# Patient Record
Sex: MALE | Race: WHITE | Employment: UNEMPLOYED | URBAN - NONMETROPOLITAN AREA
[De-identification: names, ages, dates, MRNs, and addresses within clinical notes are randomized per-mention and may not be internally consistent; named-entity substitution may affect disease eponyms.]

---

## 2017-01-21 PROCEDURE — 1800000000 HC LEAVE OF ABSENCE

## 2017-04-24 ENCOUNTER — HOSPITAL ENCOUNTER (OUTPATIENT)
Dept: GENERAL RADIOLOGY | Age: 42
Discharge: HOME | End: 2017-04-24
Payer: MEDICAID

## 2017-04-24 ENCOUNTER — HOSPITAL ENCOUNTER (OUTPATIENT)
Dept: PREADMISSION TESTING | Age: 42
Discharge: HOME | End: 2017-04-24
Payer: MEDICAID

## 2017-04-24 VITALS — WEIGHT: 218.9 LBS | BODY MASS INDEX: 32.42 KG/M2 | HEIGHT: 69 IN

## 2017-04-24 LAB
ALBUMIN SERPL-MCNC: 4.4 G/DL (ref 3.5–5.2)
ALP BLD-CCNC: 69 U/L (ref 40–130)
ALT SERPL-CCNC: 45 U/L (ref 5–41)
ANION GAP SERPL CALCULATED.3IONS-SCNC: 15 MMOL/L (ref 7–19)
APTT: 28.1 SEC (ref 26–36.2)
AST SERPL-CCNC: 26 U/L (ref 5–40)
BASOPHILS ABSOLUTE: 0.1 K/UL (ref 0–0.2)
BASOPHILS RELATIVE PERCENT: 1 % (ref 0–1)
BILIRUB SERPL-MCNC: 0.3 MG/DL (ref 0.2–1.2)
BILIRUBIN URINE: NEGATIVE
BLOOD, URINE: NEGATIVE
BUN BLDV-MCNC: 11 MG/DL (ref 6–20)
CALCIUM SERPL-MCNC: 9.4 MG/DL (ref 8.6–10)
CHLORIDE BLD-SCNC: 103 MMOL/L (ref 98–111)
CLARITY: ABNORMAL
CO2: 23 MMOL/L (ref 22–29)
COLOR: YELLOW
CREAT SERPL-MCNC: 0.7 MG/DL (ref 0.5–1.2)
EOSINOPHILS ABSOLUTE: 0.1 K/UL (ref 0–0.6)
EOSINOPHILS RELATIVE PERCENT: 2 % (ref 0–5)
GFR NON-AFRICAN AMERICAN: >60
GLOBULIN: 2.6 G/DL
GLUCOSE BLD-MCNC: 98 MG/DL (ref 74–109)
GLUCOSE URINE: NEGATIVE MG/DL
HCT VFR BLD CALC: 43.1 % (ref 42–52)
HEMOGLOBIN: 14.9 G/DL (ref 14–18)
INR BLD: 1.04 (ref 0.88–1.18)
KETONES, URINE: NEGATIVE MG/DL
LEUKOCYTE ESTERASE, URINE: NEGATIVE
LYMPHOCYTES ABSOLUTE: 2.6 K/UL (ref 1.1–4.5)
LYMPHOCYTES RELATIVE PERCENT: 36.4 % (ref 20–40)
MCH RBC QN AUTO: 30.3 PG (ref 27–31)
MCHC RBC AUTO-ENTMCNC: 34.6 G/DL (ref 33–37)
MCV RBC AUTO: 87.8 FL (ref 80–94)
MONOCYTES ABSOLUTE: 0.4 K/UL (ref 0–0.9)
MONOCYTES RELATIVE PERCENT: 6.1 % (ref 0–10)
NEUTROPHILS ABSOLUTE: 3.8 K/UL (ref 1.5–7.5)
NEUTROPHILS RELATIVE PERCENT: 53.9 % (ref 50–65)
NITRITE, URINE: NEGATIVE
PDW BLD-RTO: 12.6 % (ref 11.5–14.5)
PH UA: 5.5
PLATELET # BLD: 238 K/UL (ref 130–400)
PMV BLD AUTO: 9.9 FL (ref 7.4–10.4)
POTASSIUM SERPL-SCNC: 3.6 MMOL/L (ref 3.5–5)
PROTEIN UA: NEGATIVE MG/DL
PROTHROMBIN TIME: 13.6 SEC (ref 12–14.6)
RBC # BLD: 4.91 M/UL (ref 4.7–6.1)
SODIUM BLD-SCNC: 141 MMOL/L (ref 136–145)
SPECIFIC GRAVITY UA: >=1.03
TOTAL PROTEIN: 7 G/DL (ref 6.6–8.7)
UROBILINOGEN, URINE: 0.2 E.U./DL
WBC # BLD: 7 K/UL (ref 4.8–10.8)

## 2017-04-24 PROCEDURE — 93005 ELECTROCARDIOGRAM TRACING: CPT

## 2017-04-24 PROCEDURE — 80053 COMPREHEN METABOLIC PANEL: CPT

## 2017-04-24 PROCEDURE — 85730 THROMBOPLASTIN TIME PARTIAL: CPT

## 2017-04-24 PROCEDURE — 85025 COMPLETE CBC W/AUTO DIFF WBC: CPT

## 2017-04-24 PROCEDURE — 81003 URINALYSIS AUTO W/O SCOPE: CPT

## 2017-04-24 PROCEDURE — 71020 XR CHEST STANDARD TWO VW: CPT

## 2017-04-24 PROCEDURE — 85610 PROTHROMBIN TIME: CPT

## 2017-04-24 RX ORDER — OXYCODONE HYDROCHLORIDE AND ACETAMINOPHEN 5; 325 MG/1; MG/1
1 TABLET ORAL EVERY 4 HOURS PRN
Status: ON HOLD | COMMUNITY
End: 2017-05-18 | Stop reason: HOSPADM

## 2017-04-24 RX ORDER — LISINOPRIL 5 MG/1
5 TABLET ORAL DAILY
COMMUNITY

## 2017-04-24 RX ORDER — GABAPENTIN 300 MG/1
300 CAPSULE ORAL 3 TIMES DAILY
COMMUNITY

## 2017-04-24 RX ORDER — SIMVASTATIN 20 MG
20 TABLET ORAL NIGHTLY
COMMUNITY

## 2017-05-01 LAB
EKG P AXIS: 4 DEGREES
EKG P-R INTERVAL: 198 MS
EKG Q-T INTERVAL: 404 MS
EKG QRS DURATION: 104 MS
EKG QTC CALCULATION (BAZETT): 409 MS
EKG T AXIS: 19 DEGREES

## 2017-05-16 ENCOUNTER — HOSPITAL ENCOUNTER (INPATIENT)
Age: 42
LOS: 2 days | Discharge: HOME | DRG: 455 | End: 2017-05-18
Payer: MEDICAID

## 2017-05-16 ENCOUNTER — ANESTHESIA (OUTPATIENT)
Dept: OPERATING ROOM | Age: 42
DRG: 455 | End: 2017-05-16
Payer: MEDICAID

## 2017-05-16 ENCOUNTER — APPOINTMENT (OUTPATIENT)
Dept: GENERAL RADIOLOGY | Age: 42
DRG: 455 | End: 2017-05-16
Payer: MEDICAID

## 2017-05-16 ENCOUNTER — ANESTHESIA EVENT (OUTPATIENT)
Dept: OPERATING ROOM | Age: 42
DRG: 455 | End: 2017-05-16
Payer: MEDICAID

## 2017-05-16 VITALS
DIASTOLIC BLOOD PRESSURE: 64 MMHG | OXYGEN SATURATION: 94 % | RESPIRATION RATE: 31 BRPM | TEMPERATURE: 96.5 F | SYSTOLIC BLOOD PRESSURE: 95 MMHG

## 2017-05-16 PROBLEM — M51.36 DDD (DEGENERATIVE DISC DISEASE), LUMBAR: Status: ACTIVE | Noted: 2017-05-16

## 2017-05-16 PROBLEM — M47.26 OSTEOARTHRITIS OF SPINE WITH RADICULOPATHY, LUMBAR REGION: Status: ACTIVE | Noted: 2017-05-16

## 2017-05-16 LAB
ABO/RH: NORMAL
ANTIBODY SCREEN: NORMAL

## 2017-05-16 PROCEDURE — 97116 GAIT TRAINING THERAPY: CPT

## 2017-05-16 PROCEDURE — 2580000003 HC RX 258: Performed by: PHYSICIAN ASSISTANT

## 2017-05-16 PROCEDURE — 3600000015 HC SURGERY LEVEL 5 ADDTL 15MIN

## 2017-05-16 PROCEDURE — 86901 BLOOD TYPING SEROLOGIC RH(D): CPT

## 2017-05-16 PROCEDURE — 2580000003 HC RX 258

## 2017-05-16 PROCEDURE — 1210000000 HC MED SURG R&B

## 2017-05-16 PROCEDURE — 6360000002 HC RX W HCPCS

## 2017-05-16 PROCEDURE — 2720000001 HC MISC SURG SUPPLY STERILE $51-500

## 2017-05-16 PROCEDURE — 3700000000 HC ANESTHESIA ATTENDED CARE

## 2017-05-16 PROCEDURE — L8699 PROSTHETIC IMPLANT NOS: HCPCS

## 2017-05-16 PROCEDURE — 7100000001 HC PACU RECOVERY - ADDTL 15 MIN

## 2017-05-16 PROCEDURE — 72110 X-RAY EXAM L-2 SPINE 4/>VWS: CPT

## 2017-05-16 PROCEDURE — 97162 PT EVAL MOD COMPLEX 30 MIN: CPT

## 2017-05-16 PROCEDURE — C1776 JOINT DEVICE (IMPLANTABLE): HCPCS

## 2017-05-16 PROCEDURE — 94664 DEMO&/EVAL PT USE INHALER: CPT

## 2017-05-16 PROCEDURE — 86850 RBC ANTIBODY SCREEN: CPT

## 2017-05-16 PROCEDURE — 36415 COLL VENOUS BLD VENIPUNCTURE: CPT

## 2017-05-16 PROCEDURE — 2780000010 HC IMPLANT OTHER

## 2017-05-16 PROCEDURE — G8978 MOBILITY CURRENT STATUS: HCPCS

## 2017-05-16 PROCEDURE — C1713 ANCHOR/SCREW BN/BN,TIS/BN: HCPCS

## 2017-05-16 PROCEDURE — 6370000000 HC RX 637 (ALT 250 FOR IP): Performed by: PHYSICIAN ASSISTANT

## 2017-05-16 PROCEDURE — 2500000003 HC RX 250 WO HCPCS

## 2017-05-16 PROCEDURE — 2720000002 HC MISC SURG SUPPLY STERILE >$500

## 2017-05-16 PROCEDURE — 3600000005 HC SURGERY LEVEL 5 BASE

## 2017-05-16 PROCEDURE — G8979 MOBILITY GOAL STATUS: HCPCS

## 2017-05-16 PROCEDURE — 0SG00A0 FUSION OF LUMBAR VERTEBRAL JOINT WITH INTERBODY FUSION DEVICE, ANTERIOR APPROACH, ANTERIOR COLUMN, OPEN APPROACH: ICD-10-PCS

## 2017-05-16 PROCEDURE — 94762 N-INVAS EAR/PLS OXIMTRY CONT: CPT

## 2017-05-16 PROCEDURE — 6360000002 HC RX W HCPCS: Performed by: PHYSICIAN ASSISTANT

## 2017-05-16 PROCEDURE — 86900 BLOOD TYPING SEROLOGIC ABO: CPT

## 2017-05-16 PROCEDURE — 7100000000 HC PACU RECOVERY - FIRST 15 MIN

## 2017-05-16 PROCEDURE — 3700000001 HC ADD 15 MINUTES (ANESTHESIA)

## 2017-05-16 PROCEDURE — 3209999900 FLUORO FOR SURGICAL PROCEDURES

## 2017-05-16 DEVICE — BONE GRAFT KIT 7510200 INFUSE SMALL
Type: IMPLANTABLE DEVICE | Site: SPINE LUMBAR | Status: FUNCTIONAL
Brand: INFUSE® BONE GRAFT

## 2017-05-16 DEVICE — AGENT HEMSTAT 3GM PURIFIED PLNT STARCH PWD ABSRB ARISTA AH: Type: IMPLANTABLE DEVICE | Site: SPINE LUMBAR | Status: FUNCTIONAL

## 2017-05-16 DEVICE — GRAFT BONE SUB 10CC W25XH8XL50MM NANOSTRUCTURED HA GRAN POR: Type: IMPLANTABLE DEVICE | Site: SPINE LUMBAR | Status: FUNCTIONAL

## 2017-05-16 DEVICE — IMPLANTABLE DEVICE: Type: IMPLANTABLE DEVICE | Site: SPINE LUMBAR | Status: FUNCTIONAL

## 2017-05-16 RX ORDER — SODIUM CHLORIDE 0.9 % (FLUSH) 0.9 %
10 SYRINGE (ML) INJECTION PRN
Status: DISCONTINUED | OUTPATIENT
Start: 2017-05-16 | End: 2017-05-18 | Stop reason: HOSPADM

## 2017-05-16 RX ORDER — POLYETHYLENE GLYCOL 3350 17 G/17G
17 POWDER, FOR SOLUTION ORAL DAILY PRN
Status: DISCONTINUED | OUTPATIENT
Start: 2017-05-16 | End: 2017-05-18 | Stop reason: HOSPADM

## 2017-05-16 RX ORDER — LIDOCAINE HYDROCHLORIDE 10 MG/ML
1 INJECTION, SOLUTION EPIDURAL; INFILTRATION; INTRACAUDAL; PERINEURAL
Status: DISCONTINUED | OUTPATIENT
Start: 2017-05-16 | End: 2017-05-16

## 2017-05-16 RX ORDER — HYDRALAZINE HYDROCHLORIDE 20 MG/ML
5 INJECTION INTRAMUSCULAR; INTRAVENOUS EVERY 10 MIN PRN
Status: DISCONTINUED | OUTPATIENT
Start: 2017-05-16 | End: 2017-05-16

## 2017-05-16 RX ORDER — GLYCOPYRROLATE 0.2 MG/ML
INJECTION INTRAMUSCULAR; INTRAVENOUS PRN
Status: DISCONTINUED | OUTPATIENT
Start: 2017-05-16 | End: 2017-05-16 | Stop reason: SDUPTHER

## 2017-05-16 RX ORDER — SIMVASTATIN 20 MG
20 TABLET ORAL NIGHTLY
Status: DISCONTINUED | OUTPATIENT
Start: 2017-05-16 | End: 2017-05-18 | Stop reason: HOSPADM

## 2017-05-16 RX ORDER — GABAPENTIN 300 MG/1
300 CAPSULE ORAL 3 TIMES DAILY
Status: DISCONTINUED | OUTPATIENT
Start: 2017-05-16 | End: 2017-05-18 | Stop reason: HOSPADM

## 2017-05-16 RX ORDER — ENALAPRILAT 2.5 MG/2ML
1.25 INJECTION INTRAVENOUS
Status: DISCONTINUED | OUTPATIENT
Start: 2017-05-16 | End: 2017-05-16

## 2017-05-16 RX ORDER — PROPOFOL 10 MG/ML
INJECTION, EMULSION INTRAVENOUS PRN
Status: DISCONTINUED | OUTPATIENT
Start: 2017-05-16 | End: 2017-05-16 | Stop reason: SDUPTHER

## 2017-05-16 RX ORDER — LIDOCAINE HYDROCHLORIDE 10 MG/ML
INJECTION, SOLUTION EPIDURAL; INFILTRATION; INTRACAUDAL; PERINEURAL PRN
Status: DISCONTINUED | OUTPATIENT
Start: 2017-05-16 | End: 2017-05-16 | Stop reason: SDUPTHER

## 2017-05-16 RX ORDER — DIPHENHYDRAMINE HYDROCHLORIDE 50 MG/ML
12.5 INJECTION INTRAMUSCULAR; INTRAVENOUS
Status: DISCONTINUED | OUTPATIENT
Start: 2017-05-16 | End: 2017-05-16

## 2017-05-16 RX ORDER — LIDOCAINE HYDROCHLORIDE 10 MG/ML
1 INJECTION, SOLUTION EPIDURAL; INFILTRATION; INTRACAUDAL; PERINEURAL ONCE
Status: COMPLETED | OUTPATIENT
Start: 2017-05-16 | End: 2017-05-16

## 2017-05-16 RX ORDER — MEPERIDINE HYDROCHLORIDE 50 MG/ML
12.5 INJECTION INTRAMUSCULAR; INTRAVENOUS; SUBCUTANEOUS EVERY 5 MIN PRN
Status: DISCONTINUED | OUTPATIENT
Start: 2017-05-16 | End: 2017-05-16

## 2017-05-16 RX ORDER — METOCLOPRAMIDE HYDROCHLORIDE 5 MG/ML
10 INJECTION INTRAMUSCULAR; INTRAVENOUS
Status: DISCONTINUED | OUTPATIENT
Start: 2017-05-16 | End: 2017-05-16

## 2017-05-16 RX ORDER — BUPIVACAINE HYDROCHLORIDE 2.5 MG/ML
INJECTION, SOLUTION INFILTRATION; PERINEURAL PRN
Status: DISCONTINUED | OUTPATIENT
Start: 2017-05-16 | End: 2017-05-16

## 2017-05-16 RX ORDER — ONDANSETRON 2 MG/ML
4 INJECTION INTRAMUSCULAR; INTRAVENOUS EVERY 6 HOURS PRN
Status: DISCONTINUED | OUTPATIENT
Start: 2017-05-16 | End: 2017-05-18 | Stop reason: HOSPADM

## 2017-05-16 RX ORDER — FENTANYL CITRATE 50 UG/ML
INJECTION, SOLUTION INTRAMUSCULAR; INTRAVENOUS PRN
Status: DISCONTINUED | OUTPATIENT
Start: 2017-05-16 | End: 2017-05-16 | Stop reason: SDUPTHER

## 2017-05-16 RX ORDER — MIDAZOLAM HYDROCHLORIDE 1 MG/ML
INJECTION INTRAMUSCULAR; INTRAVENOUS PRN
Status: DISCONTINUED | OUTPATIENT
Start: 2017-05-16 | End: 2017-05-16 | Stop reason: SDUPTHER

## 2017-05-16 RX ORDER — SUCCINYLCHOLINE CHLORIDE 20 MG/ML
INJECTION INTRAMUSCULAR; INTRAVENOUS PRN
Status: DISCONTINUED | OUTPATIENT
Start: 2017-05-16 | End: 2017-05-16 | Stop reason: SDUPTHER

## 2017-05-16 RX ORDER — FENTANYL CITRATE 50 UG/ML
50 INJECTION, SOLUTION INTRAMUSCULAR; INTRAVENOUS
Status: DISCONTINUED | OUTPATIENT
Start: 2017-05-16 | End: 2017-05-16

## 2017-05-16 RX ORDER — SODIUM CHLORIDE 9 MG/ML
INJECTION, SOLUTION INTRAVENOUS CONTINUOUS
Status: ACTIVE | OUTPATIENT
Start: 2017-05-16 | End: 2017-05-18

## 2017-05-16 RX ORDER — SODIUM CHLORIDE 0.9 % (FLUSH) 0.9 %
10 SYRINGE (ML) INJECTION EVERY 12 HOURS SCHEDULED
Status: DISCONTINUED | OUTPATIENT
Start: 2017-05-16 | End: 2017-05-16

## 2017-05-16 RX ORDER — FENTANYL CITRATE 50 UG/ML
25 INJECTION, SOLUTION INTRAMUSCULAR; INTRAVENOUS
Status: DISCONTINUED | OUTPATIENT
Start: 2017-05-16 | End: 2017-05-16

## 2017-05-16 RX ORDER — EPHEDRINE SULFATE 50 MG/ML
INJECTION, SOLUTION INTRAVENOUS PRN
Status: DISCONTINUED | OUTPATIENT
Start: 2017-05-16 | End: 2017-05-16 | Stop reason: SDUPTHER

## 2017-05-16 RX ORDER — SODIUM CHLORIDE 0.9 % (FLUSH) 0.9 %
10 SYRINGE (ML) INJECTION EVERY 12 HOURS SCHEDULED
Status: DISCONTINUED | OUTPATIENT
Start: 2017-05-16 | End: 2017-05-18 | Stop reason: HOSPADM

## 2017-05-16 RX ORDER — PROMETHAZINE HYDROCHLORIDE 25 MG/ML
6.25 INJECTION, SOLUTION INTRAMUSCULAR; INTRAVENOUS
Status: DISCONTINUED | OUTPATIENT
Start: 2017-05-16 | End: 2017-05-16

## 2017-05-16 RX ORDER — OXYCODONE HYDROCHLORIDE AND ACETAMINOPHEN 5; 325 MG/1; MG/1
2 TABLET ORAL EVERY 4 HOURS PRN
Status: DISCONTINUED | OUTPATIENT
Start: 2017-05-16 | End: 2017-05-18 | Stop reason: HOSPADM

## 2017-05-16 RX ORDER — LABETALOL HYDROCHLORIDE 5 MG/ML
5 INJECTION, SOLUTION INTRAVENOUS EVERY 10 MIN PRN
Status: DISCONTINUED | OUTPATIENT
Start: 2017-05-16 | End: 2017-05-16

## 2017-05-16 RX ORDER — LISINOPRIL 5 MG/1
5 TABLET ORAL DAILY
Status: DISCONTINUED | OUTPATIENT
Start: 2017-05-16 | End: 2017-05-18 | Stop reason: HOSPADM

## 2017-05-16 RX ORDER — SODIUM CHLORIDE, SODIUM LACTATE, POTASSIUM CHLORIDE, CALCIUM CHLORIDE 600; 310; 30; 20 MG/100ML; MG/100ML; MG/100ML; MG/100ML
INJECTION, SOLUTION INTRAVENOUS CONTINUOUS
Status: DISCONTINUED | OUTPATIENT
Start: 2017-05-16 | End: 2017-05-16

## 2017-05-16 RX ORDER — DEXAMETHASONE SODIUM PHOSPHATE 4 MG/ML
INJECTION, SOLUTION INTRA-ARTICULAR; INTRALESIONAL; INTRAMUSCULAR; INTRAVENOUS; SOFT TISSUE PRN
Status: DISCONTINUED | OUTPATIENT
Start: 2017-05-16 | End: 2017-05-16

## 2017-05-16 RX ORDER — DOCUSATE SODIUM 100 MG/1
100 CAPSULE, LIQUID FILLED ORAL DAILY
Status: DISCONTINUED | OUTPATIENT
Start: 2017-05-16 | End: 2017-05-18 | Stop reason: HOSPADM

## 2017-05-16 RX ORDER — ACETAMINOPHEN 325 MG/1
650 TABLET ORAL EVERY 4 HOURS PRN
Status: DISCONTINUED | OUTPATIENT
Start: 2017-05-16 | End: 2017-05-18 | Stop reason: HOSPADM

## 2017-05-16 RX ORDER — ONDANSETRON 2 MG/ML
INJECTION INTRAMUSCULAR; INTRAVENOUS PRN
Status: DISCONTINUED | OUTPATIENT
Start: 2017-05-16 | End: 2017-05-16 | Stop reason: SDUPTHER

## 2017-05-16 RX ORDER — MORPHINE SULFATE 4 MG/ML
4 INJECTION, SOLUTION INTRAMUSCULAR; INTRAVENOUS EVERY 5 MIN PRN
Status: DISCONTINUED | OUTPATIENT
Start: 2017-05-16 | End: 2017-05-16

## 2017-05-16 RX ORDER — DEXAMETHASONE SODIUM PHOSPHATE 10 MG/ML
INJECTION INTRAMUSCULAR; INTRAVENOUS PRN
Status: DISCONTINUED | OUTPATIENT
Start: 2017-05-16 | End: 2017-05-16 | Stop reason: SDUPTHER

## 2017-05-16 RX ORDER — OXYCODONE HYDROCHLORIDE AND ACETAMINOPHEN 5; 325 MG/1; MG/1
1 TABLET ORAL EVERY 4 HOURS PRN
Status: DISCONTINUED | OUTPATIENT
Start: 2017-05-16 | End: 2017-05-18 | Stop reason: HOSPADM

## 2017-05-16 RX ORDER — SODIUM CHLORIDE 0.9 % (FLUSH) 0.9 %
10 SYRINGE (ML) INJECTION ONCE
Status: DISCONTINUED | OUTPATIENT
Start: 2017-05-16 | End: 2017-05-16

## 2017-05-16 RX ORDER — MORPHINE SULFATE 4 MG/ML
2 INJECTION, SOLUTION INTRAMUSCULAR; INTRAVENOUS EVERY 5 MIN PRN
Status: DISCONTINUED | OUTPATIENT
Start: 2017-05-16 | End: 2017-05-16

## 2017-05-16 RX ORDER — MIDAZOLAM HYDROCHLORIDE 1 MG/ML
2 INJECTION INTRAMUSCULAR; INTRAVENOUS
Status: DISCONTINUED | OUTPATIENT
Start: 2017-05-16 | End: 2017-05-16

## 2017-05-16 RX ORDER — SODIUM CHLORIDE 0.9 % (FLUSH) 0.9 %
10 SYRINGE (ML) INJECTION PRN
Status: DISCONTINUED | OUTPATIENT
Start: 2017-05-16 | End: 2017-05-16

## 2017-05-16 RX ORDER — PROPOFOL 10 MG/ML
INJECTION, EMULSION INTRAVENOUS CONTINUOUS PRN
Status: DISCONTINUED | OUTPATIENT
Start: 2017-05-16 | End: 2017-05-16 | Stop reason: SDUPTHER

## 2017-05-16 RX ORDER — KETOROLAC TROMETHAMINE 30 MG/ML
INJECTION, SOLUTION INTRAMUSCULAR; INTRAVENOUS PRN
Status: DISCONTINUED | OUTPATIENT
Start: 2017-05-16 | End: 2017-05-16 | Stop reason: SDUPTHER

## 2017-05-16 RX ADMIN — HYDROMORPHONE HYDROCHLORIDE 1 MG: 1 INJECTION, SOLUTION INTRAMUSCULAR; INTRAVENOUS; SUBCUTANEOUS at 12:00

## 2017-05-16 RX ADMIN — GLYCOPYRROLATE 0.3 MG: 0.2 INJECTION, SOLUTION INTRAMUSCULAR; INTRAVENOUS at 08:12

## 2017-05-16 RX ADMIN — SODIUM CHLORIDE: 9 INJECTION, SOLUTION INTRAVENOUS at 10:17

## 2017-05-16 RX ADMIN — HYDROMORPHONE HYDROCHLORIDE 0.5 MG: 1 INJECTION, SOLUTION INTRAMUSCULAR; INTRAVENOUS; SUBCUTANEOUS at 09:27

## 2017-05-16 RX ADMIN — MIDAZOLAM HYDROCHLORIDE 2 MG: 1 INJECTION, SOLUTION INTRAMUSCULAR; INTRAVENOUS at 07:25

## 2017-05-16 RX ADMIN — LIDOCAINE HYDROCHLORIDE 50 MG: 10 INJECTION, SOLUTION EPIDURAL; INFILTRATION; INTRACAUDAL; PERINEURAL at 07:38

## 2017-05-16 RX ADMIN — CEFAZOLIN SODIUM 1 G: 1 INJECTION, SOLUTION INTRAVENOUS at 23:37

## 2017-05-16 RX ADMIN — SIMVASTATIN 20 MG: 20 TABLET, FILM COATED ORAL at 20:02

## 2017-05-16 RX ADMIN — Medication 2 G: at 07:51

## 2017-05-16 RX ADMIN — HYDROMORPHONE HYDROCHLORIDE 0.5 MG: 1 INJECTION, SOLUTION INTRAMUSCULAR; INTRAVENOUS; SUBCUTANEOUS at 09:09

## 2017-05-16 RX ADMIN — OXYCODONE HYDROCHLORIDE AND ACETAMINOPHEN 2 TABLET: 5; 325 TABLET ORAL at 23:37

## 2017-05-16 RX ADMIN — LISINOPRIL 5 MG: 5 TABLET ORAL at 10:24

## 2017-05-16 RX ADMIN — SODIUM CHLORIDE, SODIUM LACTATE, POTASSIUM CHLORIDE, AND CALCIUM CHLORIDE: 600; 310; 30; 20 INJECTION, SOLUTION INTRAVENOUS at 06:31

## 2017-05-16 RX ADMIN — Medication 10 ML: at 20:03

## 2017-05-16 RX ADMIN — ONDANSETRON 4 MG: 2 INJECTION INTRAMUSCULAR; INTRAVENOUS at 19:30

## 2017-05-16 RX ADMIN — GABAPENTIN 300 MG: 300 CAPSULE ORAL at 20:02

## 2017-05-16 RX ADMIN — OXYCODONE HYDROCHLORIDE AND ACETAMINOPHEN 2 TABLET: 5; 325 TABLET ORAL at 10:14

## 2017-05-16 RX ADMIN — ONDANSETRON HYDROCHLORIDE 4 MG: 2 INJECTION, SOLUTION INTRAVENOUS at 07:57

## 2017-05-16 RX ADMIN — REMIFENTANIL HYDROCHLORIDE 0.7 MCG/KG/MIN: 1 INJECTION, POWDER, LYOPHILIZED, FOR SOLUTION INTRAVENOUS at 07:45

## 2017-05-16 RX ADMIN — PROPOFOL 160 MG: 10 INJECTION, EMULSION INTRAVENOUS at 07:38

## 2017-05-16 RX ADMIN — FENTANYL CITRATE 50 MCG: 50 INJECTION INTRAMUSCULAR; INTRAVENOUS at 08:55

## 2017-05-16 RX ADMIN — GABAPENTIN 300 MG: 300 CAPSULE ORAL at 15:12

## 2017-05-16 RX ADMIN — PROPOFOL 100 MCG/KG/MIN: 10 INJECTION, EMULSION INTRAVENOUS at 07:45

## 2017-05-16 RX ADMIN — DEXAMETHASONE SODIUM PHOSPHATE 10 MG: 10 INJECTION INTRAMUSCULAR; INTRAVENOUS at 07:57

## 2017-05-16 RX ADMIN — GABAPENTIN 300 MG: 300 CAPSULE ORAL at 10:23

## 2017-05-16 RX ADMIN — EPHEDRINE SULFATE 10 MG: 50 INJECTION, SOLUTION INTRAMUSCULAR; INTRAVENOUS; SUBCUTANEOUS at 08:08

## 2017-05-16 RX ADMIN — CEFAZOLIN SODIUM 1 G: 1 INJECTION, SOLUTION INTRAVENOUS at 16:27

## 2017-05-16 RX ADMIN — SODIUM CHLORIDE, SODIUM LACTATE, POTASSIUM CHLORIDE, AND CALCIUM CHLORIDE: 600; 310; 30; 20 INJECTION, SOLUTION INTRAVENOUS at 07:55

## 2017-05-16 RX ADMIN — FENTANYL CITRATE 50 MCG: 50 INJECTION INTRAMUSCULAR; INTRAVENOUS at 07:32

## 2017-05-16 RX ADMIN — OXYCODONE HYDROCHLORIDE AND ACETAMINOPHEN 2 TABLET: 5; 325 TABLET ORAL at 19:29

## 2017-05-16 RX ADMIN — FENTANYL CITRATE 50 MCG: 50 INJECTION INTRAMUSCULAR; INTRAVENOUS at 07:25

## 2017-05-16 RX ADMIN — SUCCINYLCHOLINE CHLORIDE 140 MG: 20 INJECTION, SOLUTION INTRAMUSCULAR; INTRAVENOUS; PARENTERAL at 07:38

## 2017-05-16 RX ADMIN — HYDROMORPHONE HYDROCHLORIDE 1 MG: 1 INJECTION, SOLUTION INTRAMUSCULAR; INTRAVENOUS; SUBCUTANEOUS at 16:26

## 2017-05-16 RX ADMIN — PHENYLEPHRINE HYDROCHLORIDE 100 MCG: 10 INJECTION INTRAVENOUS at 08:11

## 2017-05-16 RX ADMIN — EPHEDRINE SULFATE 10 MG: 50 INJECTION, SOLUTION INTRAMUSCULAR; INTRAVENOUS; SUBCUTANEOUS at 07:58

## 2017-05-16 RX ADMIN — KETOROLAC TROMETHAMINE 30 MG: 30 INJECTION, SOLUTION INTRAMUSCULAR at 08:51

## 2017-05-16 RX ADMIN — LIDOCAINE HYDROCHLORIDE 1 ML: 10 INJECTION, SOLUTION EPIDURAL; INFILTRATION; INTRACAUDAL; PERINEURAL at 06:31

## 2017-05-16 RX ADMIN — SODIUM CHLORIDE: 9 INJECTION, SOLUTION INTRAVENOUS at 21:45

## 2017-05-16 RX ADMIN — OXYCODONE HYDROCHLORIDE AND ACETAMINOPHEN 2 TABLET: 5; 325 TABLET ORAL at 15:13

## 2017-05-16 ASSESSMENT — PAIN SCALES - GENERAL
PAINLEVEL_OUTOF10: 6
PAINLEVEL_OUTOF10: 3
PAINLEVEL_OUTOF10: 6
PAINLEVEL_OUTOF10: 7
PAINLEVEL_OUTOF10: 0
PAINLEVEL_OUTOF10: 8
PAINLEVEL_OUTOF10: 8
PAINLEVEL_OUTOF10: 7
PAINLEVEL_OUTOF10: 6
PAINLEVEL_OUTOF10: 8
PAINLEVEL_OUTOF10: 5
PAINLEVEL_OUTOF10: 6
PAINLEVEL_OUTOF10: 8
PAINLEVEL_OUTOF10: 8

## 2017-05-16 ASSESSMENT — PAIN - FUNCTIONAL ASSESSMENT: PAIN_FUNCTIONAL_ASSESSMENT: 0-10

## 2017-05-16 NOTE — PROGRESS NOTES
Positioning, Pain Management  Plan Comment: LSO  Safety Devices  Type of devices:  (SITTING EOB WITH WIFE PRESENT)    G-Code  PT G-Codes  Functional Assessment Tool Used: BED>CHAIR  Score: MIN, CK  Functional Limitation: Mobility: Walking and moving around  Mobility: Walking and Moving Around Current Status (): At least 40 percent but less than 60 percent impaired, limited or restricted  Mobility: Walking and Moving Around Goal Status ():  At least 20 percent but less than 40 percent impaired, limited or restricted    OutComes Score                                             Goals  Short term goals  Time Frame for Short term goals: 14 DAYS BEFORE RE EVAL  Short term goal 1: SUP<>SIT IND  Short term goal 2: SIT<>STAND SBA  Short term goal 3:  FT WITH RW AND SBA  Short term goal 4: PT AND FAMILY ABLE TO DON LSO IND  Short term goal 5: STEPS WITH CGA  Patient Goals   Patient goals : RETURN HOME WITH WIFE       Therapy Time   Individual Concurrent Group Co-treatment   Time In           Time Out           Minutes                   Douglas Acosta PT     Electronically signed by Douglas Acosta PT on 5/16/2017 at 1:02 PM

## 2017-05-16 NOTE — PROGRESS NOTES
Treatment Recommendations Functional Mobility Training;Transfer Training; Safety Education & Training;Stair training;Gait Training;Positioning;Pain Management   Plan Comment LSO   PT Whiteboard Notes   Therapy Whiteboard LSO       Electronically signed by Tiffany Powell PT on 5/16/2017 at 1:12 PM

## 2017-05-16 NOTE — BRIEF OP NOTE
Brief Postoperative Note  ______________________________________________________________    Patient: Demetria Jason  YOB: 1975  MRN: [id number]  Date of Procedure: 5/16/2017    Pre-Op Diagnosis: SPINAL STENOSIS LUMBAR REGION        Procedure(s):  LUMBAR INTERBODY FUSION LATERAL LEFT L4-5    Anesthesia: Other    Surgeon(s):  Roshan Veliz MD    Staff:  Scrub Person First: Christa Richter     Estimated Blood Loss: 312CI    Complications: None    Specimens:   * No specimens in log *    Implants:    Implant Name Type Inv.  Item Serial No.  Lot No. LRB No. Used   HEMASTATIC AGENT 3GM ABSORBABLE INESSA BELLOW Bone/Graft/Tissue HEMASTATIC AGENT 3GM ABSORBABLE INESSA BELLOW  [employer]  Left 1   SAAD-GRAFT INFUSE KT SM Bone/Graft/Tissue SAAD-GRAFT INFUSE KT SM  [employer] [place] [employer] [id number] Left 1   GRAFT SUB BONE 10CC Bone/Graft/Tissue GRAFT SUB BONE 10CC   [id number] Left 1   IMPL SPINAL [id number] DEG Spine IMPL SPINAL [id number] DEG  [employer]  Left 1   IMPL KWIRE TIMBELINE Leg/Ankle/Foot/Toe IMPL KWIRE TIMBELINE   [employer]   Left 1         Drains:           Findings: SEE OPERATIVE REPORT    Carlene Freeman PA-C  Date: 5/16/2017  Time: 9:49 AM

## 2017-05-16 NOTE — BRIEF OP NOTE
Brief Postoperative Note  ______________________________________________________________    Patient: Nicolle Armenta  YOB: 1975  MRN: [id number]  Date of Procedure: 5/16/2017    Pre-Op Diagnosis: SPINAL STENOSIS LUMBAR REGION        Procedure(s):  LUMBAR INTERBODY FUSION LATERAL LEFT L4-5    Anesthesia: Other    Surgeon(s):  Ryann Mohan MD    Staff:  Scrub Person First: Giuliano Ag     Estimated Blood Loss: * 50 ml    Complications: None    Specimens:  No specimens   Implants:    Implant Name Type Inv.  Item Serial No.  Lot No. LRB No. Used   HEMASTATIC AGENT 3GM ABSORBABLE INESSA BELLOW Bone/Graft/Tissue HEMASTATIC AGENT 3GM ABSORBABLE INESSA BELLOW  [employer]  Left 1   SAAD-GRAFT INFUSE KT SM Bone/Graft/Tissue SAAD-GRAFT INFUSE KT SM  [employer] [place] [employer] [id number] Left 1   GRAFT SUB BONE 10CC Bone/Graft/Tissue GRAFT SUB BONE 10CC   [id number] Left 1   IMPL SPINAL [id number] DEG Spine IMPL SPINAL [id number] DEG  [employer]  Left 1   IMPL KWIRE TIMBELINE Leg/Ankle/Foot/Toe IMPL KWIRE TIMBELINE   [employer]   Left 1         Drains: no          Findings: see op note    Ryann Mohan MD  Date: 5/16/2017  Time: 9:51 AM

## 2017-05-16 NOTE — IP AVS SNAPSHOT
Osteoarthritis Of Spine With Radiculopathy, Lumbar Region      Visit Information     Date & Time Provider Department Dept. Phone    5/16/2017 Sierra Callahan MD [facility] 5 Surg Services [phone]       Follow-up Appointments    Below is a list of your follow-up and future appointments. This may not be a complete list as you may have made appointments directly with providers that we are not aware of or your providers may have made some for you. Please call your providers to confirm appointments. It is important to keep your appointments. Please bring your current insurance card, photo ID, co-pay, and all medication bottles to your appointment. If self-pay, payment is expected at the time of service. Follow-up Information     Follow up with Dr. Tod Mckeon M.D., MD .    Specialty:  [facility] Medicine    Contact information:    [place] [id number] [phone]          Follow up with Sierra Callahan MD. Go on 5/31/2017. Specialty:  Orthopedic Surgery    Why:  May 31st at 10:10am    Contact information:    [address] [facility]  [phone]             Care Plan Once You Return Home    This section includes instructions you will need to follow once you leave the hospital.  Your care team will discuss these with you, so you and those caring for you know how to best care for your health needs at home. This section may also include educational information about certain health topics that may be of help to you. Discharge Instructions       Wound Care: leave Prineo intact, okay to shower      Diet Instructions     ? Good nutrition is important when healing from an illness, injury, or surgery. Follow any nutrition recommendations given to you during your hospital stay. ? If you were given an oral nutrition supplement while in the hospital, continue to take this supplement at home.   You can take it with meals, medical emergencies, dial 911. For questions regarding your [employer] account call [phone]. If you have a clinical question, please call your doctor's office. View your information online  ? Review your current list of  medications, immunization, and allergies. ? Review your future test results online . ? Review your discharge instructions provided by your caregivers at discharge    Certain functionality such as prescription refills, scheduling appointments or sending messages to your provider are not activated if your provider does not use IT[employer] in his/her office    For questions regarding your [employer] account call [phone]. If you have a clinical question, please call your doctor's office. The information on all pages of the After Visit Summary has been reviewed with me, the patient and/or responsible adult, by my health care provider(s). I had the opportunity to ask questions regarding this information. I understand I should dispose of my armband safely at home to protect my health information. A complete copy of the After Visit Summary has been given to me, the patient and/or responsible adult. Patient Signature/Responsible Adult:____________________    Clinician Signature:_____________________    Date:_____________________    Time:_____________________        More Information           Lumbar Spinal Fusion: What to Expect at Home  Your Recovery    After surgery, you can expect your back to feel stiff and sore. You may have trouble sitting or standing in one position for very long and may need pain medicine in the weeks after your surgery. It may take 4 to 6 weeks to get back to doing simple activities, such as light housework. It may take 6 months to a year for your back to get better completely. You may need to wear a back brace while your back heals. And your doctor may have you go to physical therapy. If your job doesn't require physical labor, you will probably be able to go back to work after 1 or 2 months. If your job involves light physical labor, it may take 3 to 6 months. Most people whose jobs involved heavy labor can never return to those jobs. This care sheet gives you a general idea about how long it will take for you to recover. But each person recovers at a different pace. Follow the steps below to get better as quickly as possible. How can you care for yourself at home? Activity  · Rest when you feel tired. Getting enough sleep will help you recover. · Try to walk each day. Start by walking a little more than you did the day before. Bit by bit, increase the amount you walk. Walking boosts blood flow and helps prevent pneumonia and constipation. Walking may also decrease your muscle soreness after surgery. · If advised by your doctor, you may need to avoid lifting anything that would cause excessive strain on your back. This may include a child, heavy grocery bags and milk containers, a heavy briefcase or backpack, cat litter or dog food bags, or a vacuum . · Avoid strenuous activities, such as bicycle riding, jogging, weight lifting, or aerobic exercise, until your doctor says it is okay. · Do not drive for 2 to 4 weeks after your surgery or until your doctor says it is okay. · Avoid riding in a car for more than 30 minutes at a time for 2 to 4 weeks after surgery. If you must ride in a car for a longer distance, stop often to walk and stretch your legs. · Try to change your position about every 30 minutes while sitting or standing. This will help decrease your back pain while you are healing. · You will probably need to take at least 4 to 6 weeks off from work. It depends on the type of work you do and how you feel. · You may have sex as soon as you feel able, but avoid positions that put stress on your back or cause pain.   Diet · You can eat your normal diet. If your stomach is upset, try bland, low-fat foods like plain rice, broiled chicken, toast, and yogurt. · Drink plenty of fluids (unless your doctor tells you not to). · You may notice that your bowel movements are not regular right after your surgery. This is common. Try to avoid constipation and straining with bowel movements. You may want to take a fiber supplement every day. If you have not had a bowel movement after a couple of days, ask your doctor about taking a mild laxative. Medicines  · Be safe with medicines. Take pain medicines exactly as directed. ¨ If the doctor gave you a prescription medicine for pain, take it as prescribed. ¨ If you are not taking a prescription pain medicine, ask your doctor if you can take an over-the-counter medicine. · If your doctor prescribed antibiotics, take them as directed. Do not stop taking them just because you feel better. You need to take the full course of antibiotics. · If you think your pain medicine is making you sick to your stomach:  ¨ Take your medicine after meals (unless your doctor has told you not to). ¨ Ask your doctor for a different pain medicine. Incision care  · You will be given specific instructions about how to care for the cuts (incisions) the doctor made. The instructions will depend on the type of materials used to close the cut. Exercise  · Do back exercises as instructed by your doctor. · Your doctor may advise you to work with a physical therapist to improve the strength and flexibility of your back. Other instructions  · To reduce stiffness and help sore muscles, use a warm water bottle, a heating pad set on low, or a warm cloth on your back. Do not put heat right over the incision. Do not go to sleep with a heating pad on your skin. Follow-up care is a key part of your treatment and safety.  Be sure to make and go to all appointments, and call your doctor if you are having

## 2017-05-16 NOTE — OP NOTE
were obtained by aspirating in several different directions. This was passed off to the rep to prepare the stem cell/growth factor concentrate to use within the spacers.          Next, a left-sided transverse incision was made just above the iliac crest and  below the 12th rib, taken down through the skin and subcutaneous fat down to  the external oblique fascia. The external oblique fascia was opened and the  muscular fibers were divided using fingertip dissection until the internal  oblique fibers were identified. The internal obliques were divided and the  transversus abdominis musculature was divided. The retroperitoneal space was  entered. The characteristic retroperitoneal fat was visualized. The  peritoneal contents were mobilized anteriorly with the assistance of a  lighted right angled hand held retractor.     The psoas was directly visualized. The fascia over the psoas was opened and  then the muscular fibers of the psoas were dilated with the initial dilator  for the retractor. Once this was done, nerve stimulation was used  to ensure the positioning was satisfactory with regard to the positional relationship to the nerve. C-arm flouroscopy images were also used to confirm the positioning of the dilator.     Once it was determined that a safe docking point for the retractor had been  Localized over the L45 disc space, a guide wire was placed into the disc space. A larger dilator was used to further dilate the musculature and the retractor was docked onto the operative disk space without any complication.  The intradiskal jaylan was introduced into the disk space and the retractor was opened cranio-caudally and anteriorly, exposing the annulus of the disk space nicely.  The anterior retractor blade was placed in front of the anterior longitudinal ligament and behind the great vessels and stabilized by attaching it to the top of the retractor.     A 15-blade scalpel was then used to create a large annulotomy and through  this annulotomy Acuña elevators were used to separate the cartilaginous  portion of the end plates from the bony end plates and then to release the  contralateral annulus. The disc space was prepared for fusion with the pituitaries, rasps and various curettes from the disk prep set. Once this was completed and the cartilage had been removed from the end plates, the disk space was irrigated and suctioned.     The trial spacer was placed into the disk space. It was found that a [id number] mm spacer was the appropriate size spacer. A PEEK spacer of this size was  then obtained and packed with a mixture of autograft and allograft bone. The spacer was tamped to the appropriate depth and then the insertion handle was removed. The C-arm fluoroscopy images showed satisfactory positioning of the implant and  excellent restoration of the disk space height, as well as foraminal volume.     The wound was irrigated and suctioned one last time. The jaylan was removed  from the disk space. The retractor was closed somewhat. There was no  excessive bleeding noted within the psoas. Cheli powder was placed over the  insertion site of the spacer. A mixture of Marcaine and Decadron was placed  over the powder and then the retractor was removed. The peritoneal contents  were allowed to fall back into their native position. The abdominal oblique muscular layers were reapproximated with 2-0 Vicryl and the subcutaneous skin was reapproximated with 2-0 Vicryl. The skin edges were reapproximated with running subcuticular Monocryl. Mastisol, Steri-Strips and sterile dressings were applied.     The patient was transferred to the supine position on a standard recovery  room bed, where he was awakened from general endotracheal anesthesia,  extubated and taken to the recovery room in satisfactory condition. There  were no complications.  All counts were correct at the end of the case.        Electronically signed by Verenice Galvez Britany Mandujano MD on 5/16/2017 at 9:52 AM

## 2017-05-16 NOTE — IP AVS SNAPSHOT
Patient Information     Patient Name MARY Monte 1975         This is your updated medication list to keep with you all times      TAKE these medications     gabapentin 300 MG capsule   Commonly known as:  NEURONTIN       lisinopril 5 MG tablet   Commonly known as:  PRINIVIL;ZESTRIL       oxyCODONE-acetaminophen 7.5-325 MG per tablet   Commonly known as:  PERCOCET   Take 1-2 tablets by mouth every 4 hours as needed for Pain .       simvastatin 20 MG tablet   Commonly known as:  ZOCOR

## 2017-05-17 LAB
ANION GAP SERPL CALCULATED.3IONS-SCNC: 16 MMOL/L (ref 7–19)
BUN BLDV-MCNC: 12 MG/DL (ref 6–20)
CALCIUM SERPL-MCNC: 8.9 MG/DL (ref 8.6–10)
CHLORIDE BLD-SCNC: 101 MMOL/L (ref 98–111)
CO2: 22 MMOL/L (ref 22–29)
CREAT SERPL-MCNC: 0.8 MG/DL (ref 0.5–1.2)
GFR NON-AFRICAN AMERICAN: >60
GLUCOSE BLD-MCNC: 141 MG/DL (ref 74–109)
HCT VFR BLD CALC: 37.4 % (ref 42–52)
HEMOGLOBIN: 12.7 G/DL (ref 14–18)
MCH RBC QN AUTO: 30.1 PG (ref 27–31)
MCHC RBC AUTO-ENTMCNC: 34 G/DL (ref 33–37)
MCV RBC AUTO: 88.6 FL (ref 80–94)
PDW BLD-RTO: 12.7 % (ref 11.5–14.5)
PLATELET # BLD: 233 K/UL (ref 130–400)
PMV BLD AUTO: 10.2 FL (ref 7.4–10.4)
POTASSIUM SERPL-SCNC: 4.6 MMOL/L (ref 3.5–5)
RBC # BLD: 4.22 M/UL (ref 4.7–6.1)
SODIUM BLD-SCNC: 139 MMOL/L (ref 136–145)
WBC # BLD: 15 K/UL (ref 4.8–10.8)

## 2017-05-17 PROCEDURE — G8987 SELF CARE CURRENT STATUS: HCPCS

## 2017-05-17 PROCEDURE — 94762 N-INVAS EAR/PLS OXIMTRY CONT: CPT

## 2017-05-17 PROCEDURE — 97166 OT EVAL MOD COMPLEX 45 MIN: CPT

## 2017-05-17 PROCEDURE — 85027 COMPLETE CBC AUTOMATED: CPT

## 2017-05-17 PROCEDURE — G8988 SELF CARE GOAL STATUS: HCPCS

## 2017-05-17 PROCEDURE — 6370000000 HC RX 637 (ALT 250 FOR IP): Performed by: PHYSICIAN ASSISTANT

## 2017-05-17 PROCEDURE — 1210000000 HC MED SURG R&B

## 2017-05-17 PROCEDURE — 80048 BASIC METABOLIC PNL TOTAL CA: CPT

## 2017-05-17 PROCEDURE — 2580000003 HC RX 258: Performed by: PHYSICIAN ASSISTANT

## 2017-05-17 PROCEDURE — 36415 COLL VENOUS BLD VENIPUNCTURE: CPT

## 2017-05-17 RX ORDER — FAMOTIDINE 20 MG/1
20 TABLET, FILM COATED ORAL 2 TIMES DAILY
Status: DISCONTINUED | OUTPATIENT
Start: 2017-05-17 | End: 2017-05-18 | Stop reason: HOSPADM

## 2017-05-17 RX ADMIN — OXYCODONE HYDROCHLORIDE AND ACETAMINOPHEN 2 TABLET: 5; 325 TABLET ORAL at 17:09

## 2017-05-17 RX ADMIN — OXYCODONE HYDROCHLORIDE AND ACETAMINOPHEN 2 TABLET: 5; 325 TABLET ORAL at 21:05

## 2017-05-17 RX ADMIN — FAMOTIDINE 20 MG: 20 TABLET ORAL at 08:28

## 2017-05-17 RX ADMIN — GABAPENTIN 300 MG: 300 CAPSULE ORAL at 20:35

## 2017-05-17 RX ADMIN — OXYCODONE HYDROCHLORIDE AND ACETAMINOPHEN 2 TABLET: 5; 325 TABLET ORAL at 08:27

## 2017-05-17 RX ADMIN — LISINOPRIL 5 MG: 5 TABLET ORAL at 08:27

## 2017-05-17 RX ADMIN — Medication 10 ML: at 08:28

## 2017-05-17 RX ADMIN — GABAPENTIN 300 MG: 300 CAPSULE ORAL at 08:27

## 2017-05-17 RX ADMIN — Medication 10 ML: at 20:35

## 2017-05-17 RX ADMIN — GABAPENTIN 300 MG: 300 CAPSULE ORAL at 14:30

## 2017-05-17 RX ADMIN — DOCUSATE SODIUM 100 MG: 100 CAPSULE, LIQUID FILLED ORAL at 08:27

## 2017-05-17 RX ADMIN — OXYCODONE HYDROCHLORIDE AND ACETAMINOPHEN 2 TABLET: 5; 325 TABLET ORAL at 04:08

## 2017-05-17 RX ADMIN — FAMOTIDINE 20 MG: 20 TABLET ORAL at 20:35

## 2017-05-17 RX ADMIN — SIMVASTATIN 20 MG: 20 TABLET, FILM COATED ORAL at 20:34

## 2017-05-17 ASSESSMENT — PAIN DESCRIPTION - ORIENTATION: ORIENTATION: LEFT

## 2017-05-17 ASSESSMENT — PAIN SCALES - GENERAL
PAINLEVEL_OUTOF10: 4
PAINLEVEL_OUTOF10: 7
PAINLEVEL_OUTOF10: 5
PAINLEVEL_OUTOF10: 3

## 2017-05-17 ASSESSMENT — PAIN DESCRIPTION - LOCATION: LOCATION: BACK;LEG

## 2017-05-17 NOTE — PROGRESS NOTES
[facility] SPINE SURGERY  Alexsander Penaloza PA-C  Physician Assistant Progress Note        Subjective:  Back Pain, right leg numbness,weakness    Hospital LOS: 1    Vitals  VITALS:  /72  Pulse 64  Temp 97.6 °F (36.4 °C) (Temporal)   Resp 18  Ht 5' 9\" (1.753 m)  Wt 218 lb (98.9 kg)  SpO2 95%  BMI 32.19 kg/m2  24HR INTAKE/OUTPUT:    Intake/Output Summary (Last 24 hours) at 05/17/17 0719  Last data filed at 05/17/17 0355   Gross per 24 hour   Intake             2734 ml   Output                0 ml   Net             2734 ml       Current Facility-Administered Medications   Medication Dose Route Frequency Provider Last Rate Last Dose    gabapentin (NEURONTIN) capsule 300 mg  300 mg Oral TID LUCI Day-C   300 mg at 05/16/17 2002    lisinopril (PRINIVIL;ZESTRIL) tablet 5 mg  5 mg Oral Daily Sandra Gonzalez PA-C   5 mg at 05/16/17 1024    simvastatin (ZOCOR) tablet 20 mg  20 mg Oral Nightly Sandra Gonzalez PA-C   20 mg at 05/16/17 2002    0.9 % sodium chloride infusion   Intravenous Continuous Sandra Gonzalez PA-C 100 mL/hr at 05/16/17 2145      sodium chloride flush 0.9 % injection 10 mL  10 mL Intravenous 2 times per day Sandra Gonzalez PA-C   10 mL at 05/16/17 2003    sodium chloride flush 0.9 % injection 10 mL  10 mL Intravenous PRN Sandra Gonzalez PA-C        acetaminophen (TYLENOL) tablet 650 mg  650 mg Oral Q4H PRN Sandra Gonzalez PA-C        oxyCODONE-acetaminophen (PERCOCET) 5-325 MG per tablet 1 tablet  1 tablet Oral Q4H PRN Sandra Gonzalez PA-C        Or    oxyCODONE-acetaminophen (PERCOCET) 5-325 MG per tablet 2 tablet  2 tablet Oral Q4H PRN Sandra Gonzalez, PA-C   2 tablet at 05/17/17 0408    HYDROmorphone (DILAUDID) injection 0.5 mg  0.5 mg Intravenous Q3H PRN LUCI Day-C        Or    HYDROmorphone (DILAUDID) injection 1 mg  1 mg Intravenous Q3H PRN Sandra Gonzalez PA-C   1 mg at 05/16/17 1626    docusate sodium (COLACE) capsule 100 mg  100 mg Oral Daily Ofe Lake PA-C        polyethylene glycol [facility]) packet 17 g  17 g Oral Daily PRN Ofe Lake PA-C        ondansetron [facility] injection 4 mg  4 mg Intravenous Q6H PRN Ofe Lake PA-C   4 mg at 05/16/17 1930       PHYSICAL EXAM:    Orientation:  alert and oriented to person, place and time    Incision:  dressing in place, clean, dry and intact    Upper Extremity Motor :  deltoids/biceps/triceps/wirst flexion/wrist extension/finger flexion/finger extension 5/5 bilaterally    Upper Motor Neuron Signs:  None    Upper Extremity Sensory:  Intact C3-T1 distribution    Lower Extremity Motor :  Left HF weakness    Lower Extremity Sensory:  Intact L1-S1    ABNORMAL EXAM FINDINGS:  none    LABS:    CBC:   Lab Results   Component Value Date    WBC 15.0 05/17/2017    RBC 4.22 05/17/2017    HGB 12.7 05/17/2017    HCT 37.4 05/17/2017    MCV 88.6 05/17/2017    MCH 30.1 05/17/2017    MCHC 34.0 05/17/2017    RDW 12.7 05/17/2017     05/17/2017    MPV 10.2 05/17/2017     CMP:    Lab Results   Component Value Date     05/17/2017    K 4.6 05/17/2017     05/17/2017    CO2 22 05/17/2017    BUN 12 05/17/2017    CREATININE 0.8 05/17/2017    LABGLOM >60 05/17/2017    GLUCOSE 141 05/17/2017    PROT 7.0 04/24/2017    LABALBU 4.4 04/24/2017    CALCIUM 8.9 05/17/2017    BILITOT 0.3 04/24/2017    ALKPHOS 69 04/24/2017    AST 26 04/24/2017    ALT 45 04/24/2017       ASSESSMENT AND PLAN:    Patient Active Problem List    Diagnosis Date Noted    DDD (degenerative disc disease), lumbar 05/16/2017    Osteoarthritis of spine with radiculopathy, lumbar region 05/16/2017       Post operative day 1 status post LLIF L4/5    1:  Activity Level:  OOB with brace   2:  Pain Control:  Oral meds   3:  Discharge Planning:   Today vs tomorrow AM        Electronically signed by Matias Alford PA-C on 5/17/17 at 7:19 AM

## 2017-05-17 NOTE — PROGRESS NOTES
Occupational Therapy   Occupational Therapy Initial Assessment  Date: 2017   Patient Name: Nicolle Armenta  MRN: [id number]     : 1975    Past Medical History:   Diagnosis Date    Arthritis     DDD (degenerative disc disease), lumbar 2017    Hyperlipidemia     Hypertension     Kidney stones     Osteoarthritis of spine with radiculopathy, lumbar region 2017     Past Surgical History:   Procedure Laterality Date    CHOLECYSTECTOMY      LUMBAR FUSION Left 2017    LUMBAR INTERBODY FUSION LATERAL LEFT L4-5 performed by Ryann Mohan MD at [address] [place] [address] OR       Treatment Diagnosis: L4-5 Fusion      Restrictions  Restrictions/Precautions  Restrictions/Precautions: General Precautions, Fall Risk  Required Braces or Orthoses?: Yes  Required Braces or Orthoses  Spinal: Lumbar Corset  Spinal Other: LSO  Position Activity Restriction  Spinal Precautions: No Bending, No Lifting, No Twisting  Sternal Precautions: 10# Lifting Restrictions    Subjective   General  Chart Reviewed: Yes  Patient assessed for rehabilitation services?: Yes  Referring Practitioner: Donnell  Diagnosis: L4-5 interbody fusion   Subjective  Subjective: Pt pleasant and cooperative for session   Pain Assessment  Patient Currently in Pain: Yes  Pain Assessment: 0-10  Pain Level: 5  Pain Type: Acute pain  Pain Location: Back, Leg  Pain Orientation: Left  Pre Treatment Pain Screening  Pain at present: 5  Scale Used: Numeric Score  Intervention List: Patient able to continue with treatment  Oxygen Therapy  SpO2: 96 %  O2 Device: None (Room air)  Social/Functional History  Social/Functional History  Lives With: Daughter  Type of Home: Mobile home  Home Access: Stairs to enter with rails  Entrance Stairs - Number of Steps: 3  Entrance Stairs - Rails: Left  Bathroom Shower/Tub: Tub/Shower unit  Bathroom Toilet: Standard  Home Equipment: Rolling walker, Cane  Receives Help From: Family  ADL Assistance: Independent  Homemaking Assistance: Independent  Ambulation Assistance: Independent  Transfer Assistance: Independent  Active : Yes  Mode of Transportation: Car  Type of occupation: pt has not worked since January, works as , signing up for [facility] works at [employer]: able to renovate home, works on cars, lay around, TV, fishing, hangs out with his dad       Objective   Vision: Within Functional Limits  Hearing: Within functional limits    Orientation  Overall Orientation Status: Within Functional Limits  Observation/Palpation  Posture: Good  Scar: incision to lateral L side, and midline   Balance  Sitting Balance: Stand by assistance  Standing Balance: Stand by assistance  Standing Balance  Time: SBA to stand x 2 minutes to use urinal  Sit to stand: Stand by assistance  Stand to sit: Stand by assistance  ADL  Feeding: Independent  Grooming: Stand by assistance  UE Bathing: Stand by assistance  LE Bathing: Minimal assistance  UE Dressing: Stand by assistance  LE Dressing: Minimal assistance  Toileting: Stand by assistance        Bed mobility  Supine to Sit: Stand by assistance  Transfers  Sit to stand: Stand by assistance  Stand to sit: Stand by assistance  Transfer Comments: RW  Vision - Basic Assessment  Prior Vision: No visual deficits  Cognition  Overall Cognitive Status: WFL                 LUE AROM (degrees)  LUE AROM : WNL  Left Hand AROM (degrees)  Left Hand AROM: WNL  RUE AROM (degrees)  RUE AROM : WNL  Right Hand AROM (degrees)  Right Hand AROM: WNL                     Assessment   Performance deficits / Impairments: Decreased functional mobility ; Decreased high-level IADLs;Decreased balance  Assessment: Pt benefits from skilled OT to address decreased I with LE dressing, balance, and functional mobility affecting safty to complete ADL/IADL tasks for d/c home with family s/p L4-5 interbody fusion.    Treatment Diagnosis: L4-5 Fusion  Prognosis: Good  Decision Making: Medium Complexity  Patient Education: don/doff LSO brace; Adaptive technique to don socks- pt reports family can complete. Discharge Recommendations: Patient would benefit from continued therapy after discharge  REQUIRES OT FOLLOW UP: Yes  Activity Tolerance  Activity Tolerance: Patient Tolerated treatment well  Safety Devices  Safety Devices in place: Yes  Type of devices: Call light within reach; Left in bed        Discharge Recommendations:  Patient would benefit from continued therapy after discharge     Plan   Plan  Times per week: 3-5x/wk  Current Treatment Recommendations: Strengthening, Balance Training, Safety Education & Training, Equipment Evaluation, Education, & procurement, Patient/Caregiver Education & Training, Functional Mobility Training, Home Management Training, Self-Care / ADL    G-Code  OT G-codes  Functional Limitation: Self care  Self Care Current Status (): At least 20 percent but less than 40 percent impaired, limited or restricted  Self Care Goal Status (): At least 1 percent but less than 20 percent impaired, limited or restricted  OutComes Score                                                     Goals  Short term goals  Time Frame for Short term goals: 1 week  Short term goal 1: Pt will complete LE dressing with AE prn vs adaptive technique  Short term goal 2: Pt will be Modified I for LE bathing/dressing tasks  Short term goal 3: Pt will be Modified I for toieting task/transfer  Long term goals  Long term goal 1: Home with Wife   Patient Goals   Patient goals :  To go home       Therapy Time   Individual Concurrent Group Co-treatment   Time In           Time Out           Minutes                 Electronically signed by Chris Thomas OT on 5/17/2017 at 3:33 PM    Chris Thomas OT

## 2017-05-18 VITALS
OXYGEN SATURATION: 97 % | WEIGHT: 218 LBS | RESPIRATION RATE: 16 BRPM | HEIGHT: 69 IN | BODY MASS INDEX: 32.29 KG/M2 | SYSTOLIC BLOOD PRESSURE: 126 MMHG | HEART RATE: 54 BPM | TEMPERATURE: 97.3 F | DIASTOLIC BLOOD PRESSURE: 85 MMHG

## 2017-05-18 PROCEDURE — 6370000000 HC RX 637 (ALT 250 FOR IP): Performed by: PHYSICIAN ASSISTANT

## 2017-05-18 PROCEDURE — 97116 GAIT TRAINING THERAPY: CPT

## 2017-05-18 PROCEDURE — 2580000003 HC RX 258: Performed by: PHYSICIAN ASSISTANT

## 2017-05-18 PROCEDURE — 97535 SELF CARE MNGMENT TRAINING: CPT

## 2017-05-18 PROCEDURE — 97530 THERAPEUTIC ACTIVITIES: CPT

## 2017-05-18 PROCEDURE — 1800000000 HC LEAVE OF ABSENCE

## 2017-05-18 RX ORDER — OXYCODONE AND ACETAMINOPHEN 7.5; 325 MG/1; MG/1
1-2 TABLET ORAL EVERY 4 HOURS PRN
Qty: 60 TABLET | Refills: 0
Start: 2017-05-18 | End: 2017-05-25

## 2017-05-18 RX ADMIN — LISINOPRIL 5 MG: 5 TABLET ORAL at 07:22

## 2017-05-18 RX ADMIN — DOCUSATE SODIUM 100 MG: 100 CAPSULE, LIQUID FILLED ORAL at 07:21

## 2017-05-18 RX ADMIN — OXYCODONE HYDROCHLORIDE AND ACETAMINOPHEN 2 TABLET: 5; 325 TABLET ORAL at 02:30

## 2017-05-18 RX ADMIN — FAMOTIDINE 20 MG: 20 TABLET ORAL at 07:22

## 2017-05-18 RX ADMIN — GABAPENTIN 300 MG: 300 CAPSULE ORAL at 07:22

## 2017-05-18 RX ADMIN — Medication 10 ML: at 07:15

## 2017-05-18 RX ADMIN — OXYCODONE HYDROCHLORIDE AND ACETAMINOPHEN 2 TABLET: 5; 325 TABLET ORAL at 07:21

## 2017-05-18 ASSESSMENT — PAIN SCALES - GENERAL
PAINLEVEL_OUTOF10: 3
PAINLEVEL_OUTOF10: 7
PAINLEVEL_OUTOF10: 5
PAINLEVEL_OUTOF10: 5

## 2017-05-18 ASSESSMENT — PAIN DESCRIPTION - ORIENTATION: ORIENTATION: LEFT

## 2017-05-18 ASSESSMENT — PAIN DESCRIPTION - PAIN TYPE: TYPE: ACUTE PAIN

## 2017-05-18 ASSESSMENT — PAIN DESCRIPTION - DESCRIPTORS: DESCRIPTORS: ACHING;SHARP

## 2017-05-18 ASSESSMENT — PAIN DESCRIPTION - LOCATION: LOCATION: LEG

## 2017-05-18 NOTE — PROGRESS NOTES
Occupational Therapy  Facility/Department: [facility] SURG SERVICES  Daily Treatment Note  NAME: Sukumar Dorantes  : 1975  MRN: [id number]    Date of Service: 2017    Patient Diagnosis(es):   Patient Active Problem List    Diagnosis Date Noted    DDD (degenerative disc disease), lumbar 2017    Osteoarthritis of spine with radiculopathy, lumbar region 2017       Restrictions  Restrictions/Precautions  Restrictions/Precautions: General Precautions, Fall Risk  Required Braces or Orthoses?: Yes  Required Braces or Orthoses  Spinal: Lumbar Corset  Spinal Other: LSO  Position Activity Restriction  Spinal Precautions: No Bending, No Lifting, No Twisting  Sternal Precautions: 10# Lifting Restrictions  Subjective   General  Chart Reviewed: Yes  Patient assessed for rehabilitation services?: Yes  Response to previous treatment: Patient with no complaints from previous session  Family / Caregiver Present: No  Referring Practitioner: Donnell  Diagnosis: L4-5 interbody fusion   Subjective  Subjective: Pt pleasant and cooperative for session   General Comment  Comments: Pt able to verbalize only 1 of the 3 back precautions. Pain Assessment  Patient Currently in Pain: Yes  Pain Assessment: 0-10  Pain Level: 5  Pain Type: Acute pain  Pain Location: Leg  Pain Orientation: Left  Pain Descriptors: Aching; Sharp  Pain Intervention(s): Medication (see eMar)  Vital Signs  Patient Currently in Pain: Yes   Orientation     Objective    ADL  LE Dressing: Contact guard assistance (CGA for seated aspect of LE dressing using AE.)        Balance  Sitting Balance: Supervision  Standing Balance: Stand by assistance  Standing Balance  Sit to stand: Stand by assistance  Stand to sit: Stand by assistance  Bed mobility  Rolling to Right: Supervision  Supine to Sit: Supervision  Transfers  Stand Step Transfers: Stand by assistance  Sit to stand: Stand by assistance  Stand to sit: Stand by assistance

## 2017-05-18 NOTE — PROGRESS NOTES
Oral meds   3:  Discharge Planning:  Home today, stage two in two weeks       Electronically signed by Zee Appiah PA-C on 5/18/17 at 6:56 AM

## 2017-05-19 PROCEDURE — 1800000000 HC LEAVE OF ABSENCE

## 2017-05-20 PROCEDURE — 1800000000 HC LEAVE OF ABSENCE

## 2017-05-21 PROCEDURE — 1800000000 HC LEAVE OF ABSENCE

## 2017-05-22 PROCEDURE — 1800000000 HC LEAVE OF ABSENCE

## 2017-05-23 PROCEDURE — 1800000000 HC LEAVE OF ABSENCE

## 2017-05-24 PROCEDURE — 1800000000 HC LEAVE OF ABSENCE

## 2017-05-25 PROCEDURE — 1800000000 HC LEAVE OF ABSENCE

## 2017-05-26 PROCEDURE — 1800000000 HC LEAVE OF ABSENCE

## 2017-05-27 PROCEDURE — 1800000000 HC LEAVE OF ABSENCE

## 2017-05-28 PROCEDURE — 1800000000 HC LEAVE OF ABSENCE

## 2017-05-29 PROCEDURE — 1800000000 HC LEAVE OF ABSENCE

## 2017-05-30 PROCEDURE — 1800000000 HC LEAVE OF ABSENCE

## 2017-05-31 PROCEDURE — 1800000000 HC LEAVE OF ABSENCE

## 2017-06-01 ENCOUNTER — APPOINTMENT (OUTPATIENT)
Dept: GENERAL RADIOLOGY | Age: 42
DRG: 455 | End: 2017-06-01
Payer: MEDICAID

## 2017-06-01 ENCOUNTER — HOSPITAL ENCOUNTER (INPATIENT)
Age: 42
LOS: 4 days | Discharge: HOME | DRG: 455 | End: 2017-06-05
Payer: MEDICAID

## 2017-06-01 ENCOUNTER — ANESTHESIA EVENT (OUTPATIENT)
Dept: OPERATING ROOM | Age: 42
DRG: 455 | End: 2017-06-01
Payer: MEDICAID

## 2017-06-01 ENCOUNTER — ANESTHESIA (OUTPATIENT)
Dept: OPERATING ROOM | Age: 42
DRG: 455 | End: 2017-06-01
Payer: MEDICAID

## 2017-06-01 VITALS
SYSTOLIC BLOOD PRESSURE: 102 MMHG | TEMPERATURE: 96.2 F | RESPIRATION RATE: 15 BRPM | OXYGEN SATURATION: 100 % | DIASTOLIC BLOOD PRESSURE: 54 MMHG

## 2017-06-01 PROCEDURE — 0SG30AJ FUSION OF LUMBOSACRAL JOINT WITH INTERBODY FUSION DEVICE, POSTERIOR APPROACH, ANTERIOR COLUMN, OPEN APPROACH: ICD-10-PCS

## 2017-06-01 PROCEDURE — 6360000002 HC RX W HCPCS: Performed by: ANESTHESIOLOGY

## 2017-06-01 PROCEDURE — 94762 N-INVAS EAR/PLS OXIMTRY CONT: CPT

## 2017-06-01 PROCEDURE — 2720000002 HC MISC SURG SUPPLY STERILE >$500

## 2017-06-01 PROCEDURE — 6360000002 HC RX W HCPCS

## 2017-06-01 PROCEDURE — 6370000000 HC RX 637 (ALT 250 FOR IP): Performed by: ANESTHESIOLOGY

## 2017-06-01 PROCEDURE — G8987 SELF CARE CURRENT STATUS: HCPCS

## 2017-06-01 PROCEDURE — 1210000000 HC MED SURG R&B

## 2017-06-01 PROCEDURE — 3209999900 FLUORO FOR SURGICAL PROCEDURES

## 2017-06-01 PROCEDURE — 0SG00Z1 FUSION OF LUM JT, POST APPR P COL, OPEN APPROACH: ICD-10-PCS

## 2017-06-01 PROCEDURE — 2580000003 HC RX 258: Performed by: PHYSICIAN ASSISTANT

## 2017-06-01 PROCEDURE — 2500000003 HC RX 250 WO HCPCS: Performed by: NURSE ANESTHETIST, CERTIFIED REGISTERED

## 2017-06-01 PROCEDURE — 0ST40ZZ RESECTION OF LUMBOSACRAL DISC, OPEN APPROACH: ICD-10-PCS

## 2017-06-01 PROCEDURE — C1713 ANCHOR/SCREW BN/BN,TIS/BN: HCPCS

## 2017-06-01 PROCEDURE — 2580000003 HC RX 258

## 2017-06-01 PROCEDURE — 6360000002 HC RX W HCPCS: Performed by: PHYSICIAN ASSISTANT

## 2017-06-01 PROCEDURE — 6360000002 HC RX W HCPCS: Performed by: NURSE ANESTHETIST, CERTIFIED REGISTERED

## 2017-06-01 PROCEDURE — 2500000003 HC RX 250 WO HCPCS

## 2017-06-01 PROCEDURE — 2720000001 HC MISC SURG SUPPLY STERILE $51-500

## 2017-06-01 PROCEDURE — 94664 DEMO&/EVAL PT USE INHALER: CPT

## 2017-06-01 PROCEDURE — 72100 X-RAY EXAM L-S SPINE 2/3 VWS: CPT

## 2017-06-01 PROCEDURE — 0SG30Z1 FUSION OF LUMBOSACRAL JOINT, POST APPR P COL, OPEN APPROACH: ICD-10-PCS

## 2017-06-01 PROCEDURE — 6370000000 HC RX 637 (ALT 250 FOR IP): Performed by: PHYSICIAN ASSISTANT

## 2017-06-01 PROCEDURE — 3600000015 HC SURGERY LEVEL 5 ADDTL 15MIN

## 2017-06-01 PROCEDURE — 7100000000 HC PACU RECOVERY - FIRST 15 MIN

## 2017-06-01 PROCEDURE — 07DR0ZZ EXTRACTION OF ILIAC BONE MARROW, OPEN APPROACH: ICD-10-PCS

## 2017-06-01 PROCEDURE — 97166 OT EVAL MOD COMPLEX 45 MIN: CPT

## 2017-06-01 PROCEDURE — 3600000005 HC SURGERY LEVEL 5 BASE

## 2017-06-01 PROCEDURE — 3700000001 HC ADD 15 MINUTES (ANESTHESIA)

## 2017-06-01 PROCEDURE — 3700000000 HC ANESTHESIA ATTENDED CARE

## 2017-06-01 PROCEDURE — 7100000001 HC PACU RECOVERY - ADDTL 15 MIN

## 2017-06-01 PROCEDURE — C1729 CATH, DRAINAGE: HCPCS

## 2017-06-01 PROCEDURE — G8988 SELF CARE GOAL STATUS: HCPCS

## 2017-06-01 PROCEDURE — L8699 PROSTHETIC IMPLANT NOS: HCPCS

## 2017-06-01 DEVICE — SET SCR SPNL STD PEEK SILVERTON: Type: IMPLANTABLE DEVICE | Site: BACK | Status: FUNCTIONAL

## 2017-06-01 DEVICE — DEMINERALIZED BONE MATRIX (DBM) IN A LIPID CARRIER
Type: IMPLANTABLE DEVICE | Site: BACK | Status: FUNCTIONAL
Brand: STAGRAFT DBM PUTTY

## 2017-06-01 DEVICE — IMPLANTABLE DEVICE: Type: IMPLANTABLE DEVICE | Site: BACK | Status: FUNCTIONAL

## 2017-06-01 DEVICE — GRAFT BONE SUB 10CC W25XH8XL50MM NANOSTRUCTURED HA GRAN POR: Type: IMPLANTABLE DEVICE | Site: BACK | Status: FUNCTIONAL

## 2017-06-01 DEVICE — BONE GRAFT KIT 7510200 INFUSE SMALL
Type: IMPLANTABLE DEVICE | Site: BACK | Status: FUNCTIONAL
Brand: INFUSE® BONE GRAFT

## 2017-06-01 DEVICE — WIRE FIX DIA1.4MM BLNT FOR MIS SPINE TELLURIDE K: Type: IMPLANTABLE DEVICE | Site: BACK | Status: FUNCTIONAL

## 2017-06-01 DEVICE — CALIBER SPACER 12 X 30MM, 8-12MM
Type: IMPLANTABLE DEVICE | Site: BACK | Status: FUNCTIONAL
Brand: CALIBER

## 2017-06-01 DEVICE — AGENT HEMSTAT 8ML FLX TIP MTRX + DISP SURGIFLO: Type: IMPLANTABLE DEVICE | Site: BACK | Status: FUNCTIONAL

## 2017-06-01 RX ORDER — POLYETHYLENE GLYCOL 3350 17 G/17G
17 POWDER, FOR SOLUTION ORAL DAILY PRN
Status: DISCONTINUED | OUTPATIENT
Start: 2017-06-01 | End: 2017-06-05 | Stop reason: HOSPADM

## 2017-06-01 RX ORDER — PROMETHAZINE HYDROCHLORIDE 25 MG/ML
6.25 INJECTION, SOLUTION INTRAMUSCULAR; INTRAVENOUS
Status: DISCONTINUED | OUTPATIENT
Start: 2017-06-01 | End: 2017-06-01 | Stop reason: HOSPADM

## 2017-06-01 RX ORDER — OXYCODONE AND ACETAMINOPHEN 10; 325 MG/1; MG/1
1 TABLET ORAL EVERY 4 HOURS PRN
Status: DISCONTINUED | OUTPATIENT
Start: 2017-06-01 | End: 2017-06-04

## 2017-06-01 RX ORDER — METOCLOPRAMIDE HYDROCHLORIDE 5 MG/ML
10 INJECTION INTRAMUSCULAR; INTRAVENOUS
Status: DISCONTINUED | OUTPATIENT
Start: 2017-06-01 | End: 2017-06-01 | Stop reason: HOSPADM

## 2017-06-01 RX ORDER — SODIUM CHLORIDE 0.9 % (FLUSH) 0.9 %
10 SYRINGE (ML) INJECTION PRN
Status: DISCONTINUED | OUTPATIENT
Start: 2017-06-01 | End: 2017-06-01 | Stop reason: HOSPADM

## 2017-06-01 RX ORDER — DIAZEPAM 5 MG/1
5 TABLET ORAL EVERY 6 HOURS PRN
Status: DISCONTINUED | OUTPATIENT
Start: 2017-06-01 | End: 2017-06-04

## 2017-06-01 RX ORDER — MIDAZOLAM HYDROCHLORIDE 1 MG/ML
2 INJECTION INTRAMUSCULAR; INTRAVENOUS
Status: COMPLETED | OUTPATIENT
Start: 2017-06-01 | End: 2017-06-01

## 2017-06-01 RX ORDER — MORPHINE SULFATE 4 MG/ML
2 INJECTION, SOLUTION INTRAMUSCULAR; INTRAVENOUS EVERY 5 MIN PRN
Status: DISCONTINUED | OUTPATIENT
Start: 2017-06-01 | End: 2017-06-01 | Stop reason: HOSPADM

## 2017-06-01 RX ORDER — HYDRALAZINE HYDROCHLORIDE 20 MG/ML
5 INJECTION INTRAMUSCULAR; INTRAVENOUS EVERY 10 MIN PRN
Status: DISCONTINUED | OUTPATIENT
Start: 2017-06-01 | End: 2017-06-01 | Stop reason: HOSPADM

## 2017-06-01 RX ORDER — EPHEDRINE SULFATE 50 MG/ML
INJECTION, SOLUTION INTRAVENOUS PRN
Status: DISCONTINUED | OUTPATIENT
Start: 2017-06-01 | End: 2017-06-01 | Stop reason: SDUPTHER

## 2017-06-01 RX ORDER — PROPOFOL 10 MG/ML
INJECTION, EMULSION INTRAVENOUS CONTINUOUS PRN
Status: DISCONTINUED | OUTPATIENT
Start: 2017-06-01 | End: 2017-06-01 | Stop reason: SDUPTHER

## 2017-06-01 RX ORDER — SODIUM CHLORIDE 0.9 % (FLUSH) 0.9 %
10 SYRINGE (ML) INJECTION EVERY 12 HOURS SCHEDULED
Status: DISCONTINUED | OUTPATIENT
Start: 2017-06-01 | End: 2017-06-01 | Stop reason: HOSPADM

## 2017-06-01 RX ORDER — ROCURONIUM BROMIDE 10 MG/ML
INJECTION, SOLUTION INTRAVENOUS PRN
Status: DISCONTINUED | OUTPATIENT
Start: 2017-06-01 | End: 2017-06-01 | Stop reason: SDUPTHER

## 2017-06-01 RX ORDER — PROPOFOL 10 MG/ML
INJECTION, EMULSION INTRAVENOUS PRN
Status: DISCONTINUED | OUTPATIENT
Start: 2017-06-01 | End: 2017-06-01 | Stop reason: SDUPTHER

## 2017-06-01 RX ORDER — MORPHINE SULFATE 4 MG/ML
4 INJECTION, SOLUTION INTRAMUSCULAR; INTRAVENOUS
Status: DISCONTINUED | OUTPATIENT
Start: 2017-06-01 | End: 2017-06-01 | Stop reason: HOSPADM

## 2017-06-01 RX ORDER — ONDANSETRON 2 MG/ML
4 INJECTION INTRAMUSCULAR; INTRAVENOUS EVERY 6 HOURS PRN
Status: DISCONTINUED | OUTPATIENT
Start: 2017-06-01 | End: 2017-06-05 | Stop reason: HOSPADM

## 2017-06-01 RX ORDER — DIPHENHYDRAMINE HYDROCHLORIDE 50 MG/ML
12.5 INJECTION INTRAMUSCULAR; INTRAVENOUS
Status: DISCONTINUED | OUTPATIENT
Start: 2017-06-01 | End: 2017-06-01 | Stop reason: HOSPADM

## 2017-06-01 RX ORDER — SODIUM CHLORIDE 0.9 % (FLUSH) 0.9 %
10 SYRINGE (ML) INJECTION ONCE
Status: COMPLETED | OUTPATIENT
Start: 2017-06-01 | End: 2017-06-01

## 2017-06-01 RX ORDER — LISINOPRIL 5 MG/1
5 TABLET ORAL DAILY
Status: DISCONTINUED | OUTPATIENT
Start: 2017-06-01 | End: 2017-06-05 | Stop reason: HOSPADM

## 2017-06-01 RX ORDER — ENALAPRILAT 2.5 MG/2ML
1.25 INJECTION INTRAVENOUS
Status: DISCONTINUED | OUTPATIENT
Start: 2017-06-01 | End: 2017-06-01 | Stop reason: HOSPADM

## 2017-06-01 RX ORDER — SUCCINYLCHOLINE CHLORIDE 20 MG/ML
INJECTION INTRAMUSCULAR; INTRAVENOUS PRN
Status: DISCONTINUED | OUTPATIENT
Start: 2017-06-01 | End: 2017-06-01 | Stop reason: SDUPTHER

## 2017-06-01 RX ORDER — FENTANYL CITRATE 50 UG/ML
INJECTION, SOLUTION INTRAMUSCULAR; INTRAVENOUS PRN
Status: DISCONTINUED | OUTPATIENT
Start: 2017-06-01 | End: 2017-06-01 | Stop reason: SDUPTHER

## 2017-06-01 RX ORDER — DOCUSATE SODIUM 100 MG/1
100 CAPSULE, LIQUID FILLED ORAL DAILY
Status: DISCONTINUED | OUTPATIENT
Start: 2017-06-01 | End: 2017-06-05 | Stop reason: HOSPADM

## 2017-06-01 RX ORDER — LABETALOL HYDROCHLORIDE 5 MG/ML
5 INJECTION, SOLUTION INTRAVENOUS EVERY 10 MIN PRN
Status: DISCONTINUED | OUTPATIENT
Start: 2017-06-01 | End: 2017-06-01 | Stop reason: HOSPADM

## 2017-06-01 RX ORDER — SIMVASTATIN 20 MG
20 TABLET ORAL NIGHTLY
Status: DISCONTINUED | OUTPATIENT
Start: 2017-06-01 | End: 2017-06-05 | Stop reason: HOSPADM

## 2017-06-01 RX ORDER — LIDOCAINE HYDROCHLORIDE 10 MG/ML
1 INJECTION, SOLUTION EPIDURAL; INFILTRATION; INTRACAUDAL; PERINEURAL ONCE
Status: COMPLETED | OUTPATIENT
Start: 2017-06-01 | End: 2017-06-01

## 2017-06-01 RX ORDER — SCOLOPAMINE TRANSDERMAL SYSTEM 1 MG/1
1 PATCH, EXTENDED RELEASE TRANSDERMAL
Status: DISCONTINUED | OUTPATIENT
Start: 2017-06-01 | End: 2017-06-04

## 2017-06-01 RX ORDER — SODIUM CHLORIDE 0.9 % (FLUSH) 0.9 %
10 SYRINGE (ML) INJECTION PRN
Status: DISCONTINUED | OUTPATIENT
Start: 2017-06-01 | End: 2017-06-05 | Stop reason: HOSPADM

## 2017-06-01 RX ORDER — MIDAZOLAM HYDROCHLORIDE 1 MG/ML
INJECTION INTRAMUSCULAR; INTRAVENOUS PRN
Status: DISCONTINUED | OUTPATIENT
Start: 2017-06-01 | End: 2017-06-01 | Stop reason: SDUPTHER

## 2017-06-01 RX ORDER — MORPHINE SULFATE 4 MG/ML
4 INJECTION, SOLUTION INTRAMUSCULAR; INTRAVENOUS EVERY 5 MIN PRN
Status: DISCONTINUED | OUTPATIENT
Start: 2017-06-01 | End: 2017-06-01 | Stop reason: HOSPADM

## 2017-06-01 RX ORDER — GABAPENTIN 300 MG/1
300 CAPSULE ORAL 3 TIMES DAILY
Status: DISCONTINUED | OUTPATIENT
Start: 2017-06-01 | End: 2017-06-05 | Stop reason: HOSPADM

## 2017-06-01 RX ORDER — MEPERIDINE HYDROCHLORIDE 50 MG/ML
12.5 INJECTION INTRAMUSCULAR; INTRAVENOUS; SUBCUTANEOUS EVERY 5 MIN PRN
Status: DISCONTINUED | OUTPATIENT
Start: 2017-06-01 | End: 2017-06-01 | Stop reason: HOSPADM

## 2017-06-01 RX ORDER — HEPARIN SODIUM 5000 [USP'U]/ML
INJECTION, SOLUTION INTRAVENOUS; SUBCUTANEOUS PRN
Status: DISCONTINUED | OUTPATIENT
Start: 2017-06-01 | End: 2017-06-01 | Stop reason: HOSPADM

## 2017-06-01 RX ORDER — OXYCODONE AND ACETAMINOPHEN 10; 325 MG/1; MG/1
2 TABLET ORAL EVERY 4 HOURS PRN
Status: DISCONTINUED | OUTPATIENT
Start: 2017-06-01 | End: 2017-06-04

## 2017-06-01 RX ORDER — SODIUM CHLORIDE 0.9 % (FLUSH) 0.9 %
10 SYRINGE (ML) INJECTION EVERY 12 HOURS SCHEDULED
Status: DISCONTINUED | OUTPATIENT
Start: 2017-06-01 | End: 2017-06-05 | Stop reason: HOSPADM

## 2017-06-01 RX ORDER — DEXAMETHASONE SODIUM PHOSPHATE 10 MG/ML
INJECTION INTRAMUSCULAR; INTRAVENOUS PRN
Status: DISCONTINUED | OUTPATIENT
Start: 2017-06-01 | End: 2017-06-01 | Stop reason: SDUPTHER

## 2017-06-01 RX ORDER — SODIUM CHLORIDE 9 MG/ML
INJECTION, SOLUTION INTRAVENOUS CONTINUOUS
Status: DISCONTINUED | OUTPATIENT
Start: 2017-06-01 | End: 2017-06-03

## 2017-06-01 RX ORDER — ACETAMINOPHEN 325 MG/1
650 TABLET ORAL EVERY 4 HOURS PRN
Status: DISCONTINUED | OUTPATIENT
Start: 2017-06-01 | End: 2017-06-05 | Stop reason: HOSPADM

## 2017-06-01 RX ORDER — LIDOCAINE HYDROCHLORIDE 10 MG/ML
INJECTION, SOLUTION EPIDURAL; INFILTRATION; INTRACAUDAL; PERINEURAL PRN
Status: DISCONTINUED | OUTPATIENT
Start: 2017-06-01 | End: 2017-06-01 | Stop reason: SDUPTHER

## 2017-06-01 RX ORDER — SODIUM CHLORIDE, SODIUM LACTATE, POTASSIUM CHLORIDE, CALCIUM CHLORIDE 600; 310; 30; 20 MG/100ML; MG/100ML; MG/100ML; MG/100ML
INJECTION, SOLUTION INTRAVENOUS CONTINUOUS
Status: DISCONTINUED | OUTPATIENT
Start: 2017-06-01 | End: 2017-06-01

## 2017-06-01 RX ORDER — ONDANSETRON 2 MG/ML
INJECTION INTRAMUSCULAR; INTRAVENOUS PRN
Status: DISCONTINUED | OUTPATIENT
Start: 2017-06-01 | End: 2017-06-01 | Stop reason: SDUPTHER

## 2017-06-01 RX ORDER — LIDOCAINE HYDROCHLORIDE 10 MG/ML
1 INJECTION, SOLUTION EPIDURAL; INFILTRATION; INTRACAUDAL; PERINEURAL
Status: DISCONTINUED | OUTPATIENT
Start: 2017-06-01 | End: 2017-06-01 | Stop reason: HOSPADM

## 2017-06-01 RX ORDER — FENTANYL CITRATE 50 UG/ML
50 INJECTION, SOLUTION INTRAMUSCULAR; INTRAVENOUS
Status: DISCONTINUED | OUTPATIENT
Start: 2017-06-01 | End: 2017-06-01 | Stop reason: HOSPADM

## 2017-06-01 RX ADMIN — ROCURONIUM BROMIDE 5 MG: 10 INJECTION INTRAVENOUS at 07:31

## 2017-06-01 RX ADMIN — LIDOCAINE HYDROCHLORIDE 1 ML: 10 INJECTION, SOLUTION EPIDURAL; INFILTRATION; INTRACAUDAL; PERINEURAL at 06:23

## 2017-06-01 RX ADMIN — PHENYLEPHRINE HYDROCHLORIDE 200 MCG: 10 INJECTION INTRAVENOUS at 10:00

## 2017-06-01 RX ADMIN — PHENYLEPHRINE HYDROCHLORIDE 100 MCG: 10 INJECTION INTRAVENOUS at 09:28

## 2017-06-01 RX ADMIN — PROPOFOL 140 MCG/KG/MIN: 10 INJECTION, EMULSION INTRAVENOUS at 07:40

## 2017-06-01 RX ADMIN — FENTANYL CITRATE 100 MCG: 50 INJECTION INTRAMUSCULAR; INTRAVENOUS at 08:13

## 2017-06-01 RX ADMIN — GABAPENTIN 300 MG: 300 CAPSULE ORAL at 13:28

## 2017-06-01 RX ADMIN — OXYCODONE HYDROCHLORIDE AND ACETAMINOPHEN 1 TABLET: 10; 325 TABLET ORAL at 17:10

## 2017-06-01 RX ADMIN — SUCCINYLCHOLINE CHLORIDE 180 MG: 20 INJECTION, SOLUTION INTRAMUSCULAR; INTRAVENOUS; PARENTERAL at 07:33

## 2017-06-01 RX ADMIN — FENTANYL CITRATE 100 MCG: 50 INJECTION INTRAMUSCULAR; INTRAVENOUS at 10:22

## 2017-06-01 RX ADMIN — ONDANSETRON HYDROCHLORIDE 4 MG: 2 INJECTION, SOLUTION INTRAVENOUS at 07:55

## 2017-06-01 RX ADMIN — OXYCODONE HYDROCHLORIDE AND ACETAMINOPHEN 2 TABLET: 10; 325 TABLET ORAL at 22:41

## 2017-06-01 RX ADMIN — DIAZEPAM 5 MG: 5 TABLET ORAL at 20:13

## 2017-06-01 RX ADMIN — EPHEDRINE SULFATE 10 MG: 50 INJECTION, SOLUTION INTRAMUSCULAR; INTRAVENOUS; SUBCUTANEOUS at 10:11

## 2017-06-01 RX ADMIN — MIDAZOLAM 2 MG: 1 INJECTION INTRAMUSCULAR; INTRAVENOUS at 06:50

## 2017-06-01 RX ADMIN — EPHEDRINE SULFATE 10 MG: 50 INJECTION, SOLUTION INTRAMUSCULAR; INTRAVENOUS; SUBCUTANEOUS at 08:23

## 2017-06-01 RX ADMIN — LIDOCAINE HYDROCHLORIDE 50 MG: 10 INJECTION, SOLUTION EPIDURAL; INFILTRATION; INTRACAUDAL; PERINEURAL at 07:33

## 2017-06-01 RX ADMIN — FENTANYL CITRATE 50 MCG: 50 INJECTION INTRAMUSCULAR; INTRAVENOUS at 07:30

## 2017-06-01 RX ADMIN — DEXAMETHASONE SODIUM PHOSPHATE 8 MG: 10 INJECTION INTRAMUSCULAR; INTRAVENOUS at 07:55

## 2017-06-01 RX ADMIN — HYDROMORPHONE HYDROCHLORIDE 1 MG: 1 INJECTION, SOLUTION INTRAMUSCULAR; INTRAVENOUS; SUBCUTANEOUS at 20:13

## 2017-06-01 RX ADMIN — GABAPENTIN 300 MG: 300 CAPSULE ORAL at 20:13

## 2017-06-01 RX ADMIN — HYDROMORPHONE HYDROCHLORIDE 1 MG: 1 INJECTION, SOLUTION INTRAMUSCULAR; INTRAVENOUS; SUBCUTANEOUS at 12:16

## 2017-06-01 RX ADMIN — SODIUM CHLORIDE: 9 INJECTION, SOLUTION INTRAVENOUS at 12:18

## 2017-06-01 RX ADMIN — PHENYLEPHRINE HYDROCHLORIDE 200 MCG: 10 INJECTION INTRAVENOUS at 09:51

## 2017-06-01 RX ADMIN — PROPOFOL 200 MG: 10 INJECTION, EMULSION INTRAVENOUS at 07:33

## 2017-06-01 RX ADMIN — DOCUSATE SODIUM 100 MG: 100 CAPSULE, LIQUID FILLED ORAL at 12:16

## 2017-06-01 RX ADMIN — SODIUM CHLORIDE, SODIUM LACTATE, POTASSIUM CHLORIDE, AND CALCIUM CHLORIDE: 600; 310; 30; 20 INJECTION, SOLUTION INTRAVENOUS at 06:23

## 2017-06-01 RX ADMIN — PHENYLEPHRINE HYDROCHLORIDE 100 MCG: 10 INJECTION INTRAVENOUS at 09:43

## 2017-06-01 RX ADMIN — SIMVASTATIN 20 MG: 20 TABLET, FILM COATED ORAL at 20:13

## 2017-06-01 RX ADMIN — DIAZEPAM 5 MG: 5 TABLET ORAL at 12:49

## 2017-06-01 RX ADMIN — HYDROMORPHONE HYDROCHLORIDE 0.5 MG: 1 INJECTION, SOLUTION INTRAMUSCULAR; INTRAVENOUS; SUBCUTANEOUS at 11:25

## 2017-06-01 RX ADMIN — ONDANSETRON 4 MG: 2 INJECTION INTRAMUSCULAR; INTRAVENOUS at 12:16

## 2017-06-01 RX ADMIN — CEFAZOLIN SODIUM 1 G: 1 INJECTION, SOLUTION INTRAVENOUS at 17:08

## 2017-06-01 RX ADMIN — SODIUM CHLORIDE, SODIUM LACTATE, POTASSIUM CHLORIDE, AND CALCIUM CHLORIDE: 600; 310; 30; 20 INJECTION, SOLUTION INTRAVENOUS at 10:07

## 2017-06-01 RX ADMIN — Medication 10 ML: at 20:14

## 2017-06-01 RX ADMIN — MIDAZOLAM HYDROCHLORIDE 2 MG: 1 INJECTION, SOLUTION INTRAMUSCULAR; INTRAVENOUS at 07:26

## 2017-06-01 RX ADMIN — OXYCODONE HYDROCHLORIDE AND ACETAMINOPHEN 1 TABLET: 10; 325 TABLET ORAL at 13:28

## 2017-06-01 RX ADMIN — HYDROMORPHONE HYDROCHLORIDE 0.5 MG: 1 INJECTION, SOLUTION INTRAMUSCULAR; INTRAVENOUS; SUBCUTANEOUS at 11:20

## 2017-06-01 RX ADMIN — LISINOPRIL 5 MG: 5 TABLET ORAL at 12:16

## 2017-06-01 RX ADMIN — EPHEDRINE SULFATE 15 MG: 50 INJECTION, SOLUTION INTRAMUSCULAR; INTRAVENOUS; SUBCUTANEOUS at 08:01

## 2017-06-01 RX ADMIN — Medication 10 ML: at 06:23

## 2017-06-01 RX ADMIN — PHENYLEPHRINE HYDROCHLORIDE 200 MCG: 10 INJECTION INTRAVENOUS at 08:39

## 2017-06-01 RX ADMIN — SODIUM CHLORIDE, SODIUM LACTATE, POTASSIUM CHLORIDE, AND CALCIUM CHLORIDE: 600; 310; 30; 20 INJECTION, SOLUTION INTRAVENOUS at 07:35

## 2017-06-01 RX ADMIN — Medication 2 G: at 07:47

## 2017-06-01 ASSESSMENT — PAIN SCALES - GENERAL
PAINLEVEL_OUTOF10: 6
PAINLEVEL_OUTOF10: 8
PAINLEVEL_OUTOF10: 8
PAINLEVEL_OUTOF10: 10
PAINLEVEL_OUTOF10: 3
PAINLEVEL_OUTOF10: 0
PAINLEVEL_OUTOF10: 0
PAINLEVEL_OUTOF10: 10

## 2017-06-01 ASSESSMENT — PAIN - FUNCTIONAL ASSESSMENT
PAIN_FUNCTIONAL_ASSESSMENT: 0-10
PAIN_FUNCTIONAL_ASSESSMENT: UTA
PAIN_FUNCTIONAL_ASSESSMENT: 0-10
PAIN_FUNCTIONAL_ASSESSMENT: UTA
PAIN_FUNCTIONAL_ASSESSMENT: 0-10

## 2017-06-01 ASSESSMENT — PAIN DESCRIPTION - PAIN TYPE: TYPE: SURGICAL PAIN

## 2017-06-01 ASSESSMENT — PAIN DESCRIPTION - DESCRIPTORS: DESCRIPTORS: ACHING

## 2017-06-01 ASSESSMENT — PAIN DESCRIPTION - LOCATION: LOCATION: BACK

## 2017-06-01 ASSESSMENT — PAIN DESCRIPTION - ORIENTATION: ORIENTATION: LOWER

## 2017-06-01 ASSESSMENT — PAIN DESCRIPTION - FREQUENCY: FREQUENCY: CONTINUOUS

## 2017-06-01 NOTE — INTERVAL H&P NOTE
H&P reviewed. The patient was examined and there are no changes to the H&P. Pt still has some LLE L5 & S1 pain.   Plan is to proceed with TLIF L5S1 & PSF L4-S1 with decompression of the left sided nerve roots of L5 & S1.

## 2017-06-01 NOTE — OP NOTE
PREOPERATIVE DIAGNOSES:  1. Lumbar degenerative disk disease, L4-S1. 2. left greater than left neural foraminal stenosis L5/S1. 3. Facet hypertrophy with anterior facet spurring creating severe foraminal  stenosis on the left side at L5/S1. 4. Chronic left lower extremity lumbar radiculopathy in L5  and S1  Distribution. 5. S/P LLIF L45      POSTOPERATIVE DIAGNOSIS:   Same     PROCEDURES:  1. Left-sided L5, S1 hemilaminectomy with subtotal facetectomy and decompression of  theL5 and S1 nerve roots (more than that required for a TLIF)l. 2. Transforaminal lumbar interbody fusion L5, S1.  3. Placement of biomechanical interbody fusion device L5, S1 using  the Globus Caliber [id number] mm spacer. 4. Posterolateral fusion with pedicle screw instrumentation, L4- S1 (Lanx, [place] II 6.5x45 mm screws). 5. Use of allograft (Nanos, RTi) and autograft bone as well as autologous stem cells harvested from the left supra-acetabular column and centrifuged in the room. 6. Use of intraoperative fluoroscopy. 7. Use of surgical microscope for microscopic decompression of the neural  elements as well as preparation of the disk space for the TLIF.  8. Use of intraoperative neuro monitoring. 9. Harvesting and application of the patient's own stem cell/BMA  10. Use of intraoperative neuro monitoring with pedicle screw testing       STAFF SURGEON: Renard Ritchie MD     ASSISTANT: Rosie Barlow PA-C   [facility] assisted throughout all key components of this case by retracting soft  tissues and helping to enable adequate visualization to safely complete all  aspects of this procedure. He was scrubbed throughout the entirety of the  procedure.     ANESTHESIA: General endotracheal anesthesia.     ESTIMATED BLOOD LOSS: Less than Less than 100 ml mL.     IV FLUIDS: Crystalloid.     COMPLICATIONS: None.     IMPLANTS: See above     PROCEDURE IN DETAIL: The patient was seen on the day of surgery in the preop  holding area.  The operative

## 2017-06-01 NOTE — BRIEF OP NOTE
Brief Postoperative Note  ______________________________________________________________    Patient: Jason Isabel  YOB: 1975  MRN: [id number]  Date of Procedure: 6/1/2017    Pre-Op Diagnosis: SPINAL STENOSIS LUMBAR REGION        Procedure(s):  L4-S1 POSTERIOR SPINAL FUSION WITH INSTRUMENTATION   TLIF L5-S1    Anesthesia: Other    Surgeon(s):  Baldev Elizalde MD    Staff:  First Assistant: Jose Messina PA-C  Scrub Person First: Junito Tom; Key Elam     Estimated Blood Loss: [id number]    Complications: None    Specimens:   * No specimens in log *    Implants:    Implant Name Type Inv.  Item Serial No.  Lot No. LRB No. Used             SAAD-GRAFT INFUSE KT SM Bone/Graft/Tissue SAAD-GRAFT INFUSE KT SM  [employer] [place] [employer] [id number] N/A 1   GRAFT SUB BONE 10CC Bone/Graft/Tissue GRAFT SUB BONE 10CC  [employer] [id number] N/A 1   SEALANT SURGIFLO HEMOSTAT MATRIC Bone/Graft/Tissue SEALANT SURGIFLO HEMOSTAT MATRIC  [employer]: [employer] [id number] N/A 1   PUTTY STAGRAFT 5CC Bone/Graft/Tissue PUTTY STAGRAFT 5CC  [employer] [id number] N/A 1   IMPL SPACER SPINE CALIBER [id number] 8-12MM Spine IMPL SPACER SPINE CALIBER [id number] 8-12MM  [employer]  N/A 1   SCREW SPINE 6.5X45MM Spine SCREW SPINE 6.5X45MM  [employer]  N/A 5   IMPL NEGRO SPINE PREBENT 35MM Spine IMPL NEGRO SPINE PREBENT 35MM  [employer]  N/A 1   NEGRO 65MM Spine NEGRO 65MM  [employer]  N/A 1   SET SCREW BONE SPINE STD GEN 2 TITANIUM Spine SET SCREW BONE SPINE STD GEN 2 TITANIUM  [employer]  N/A 5   IMPL KWIRE BLUNT 1.4MM Leg/Ankle/Foot/Toe IMPL KWIRE BLUNT 1.4MM   [employer]   N/A 5         Drains:           Findings: SEE OPERATIVE REPORT    Jose Messina PA-C  Date: 6/1/2017  Time: 10:41 AM

## 2017-06-01 NOTE — IP AVS SNAPSHOT
After Visit Summary  (Discharge Instructions)    Medication List for Home    Based on the information you provided to us as well as any changes during this visit, the following is your updated medication list.  Compare this with your prescription bottles at home. If you have any questions or concerns, contact your primary care physician's office. Daily Medication List (This medication list can be shared with any healthcare provider who is helping you manage your medications)      There are NEW medications for you. START taking them after you leave the hospital        Last Dose    Next Dose Due AM NOON PM NIGHT    oxyCODONE-acetaminophen  MG per tablet   Commonly known as:  PERCOCET   Take 1-2 tablets by mouth every 6 hours as needed for Pain . 1 tablet on 6/5/2017  2:04 PM     Most recent dose was at 2:04 PM  Can have every 6 hrs as needed                         These are medications you told us you were taking at home, CONTINUE taking them after you leave the hospital        Last Dose    Next Dose Due AM NOON PM NIGHT    gabapentin 300 MG capsule   Commonly known as:  NEURONTIN   Take 300 mg by mouth 3 times daily                300 mg on 6/5/2017  2:04 PM     06/05/17                          lisinopril 5 MG tablet   Commonly known as:  PRINIVIL;ZESTRIL   Take 5 mg by mouth daily                5 mg on 6/5/2017  9:54 AM     06/06/17                          simvastatin 20 MG tablet   Commonly known as:  ZOCOR   Take 20 mg by mouth nightly                20 mg on 6/4/2017 10:11 PM     06/05/17                               Where to Get Your Medications      Information about where to get these medications is not yet available     ! Ask your nurse or doctor about these medications     oxyCODONE-acetaminophen  MG per tablet               Allergies as of 6/5/2017     No Known Allergies      Immunizations as of 6/5/2017     No immunizations on file.       Last Vitals Most Recent Value    Temp  99.8 °F (37.7 °C)    Pulse  110    Resp  16    BP  138/80         After Visit Summary    This summary was created for you. Thank you for entrusting your care to us. The following information includes details about your hospital/visit stay along with steps you should take to help with your recovery once you leave the hospital.  In this packet, you will find information about the topics listed below:    · Instructions about your medications including a list of your home medications  · A summary of your hospital visit  · Follow-up appointments once you have left the hospital  · Your care plan at home      You may receive a survey regarding the care you received during your stay. Your input is valuable to us. We encourage you to complete and return your survey in the envelope provided. We hope you will choose us in the future for your healthcare needs. Patient Information     Patient Name MARY Delgado 1975      Care Provided at:     Name Address Phone       [address] [facility] [phone]            Your Visit    Here you will find information about your visit, including the reason for your visit. Please take this sheet with you when you visit your doctor or other health care provider in the future. It will help determine the best possible medical care for you at that time. If you have any questions once you leave the hospital, please call the department phone number listed below. Why you were here     Your primary diagnosis was:  Not on File    Your diagnoses also included:  Osteoarthritis Of Spine With Radiculopathy, Lumbar Region, Ddd (Degenerative Disc Disease), Lumbar      Visit Information     Date & Time Provider Department Dept. Phone    2017 Aaron Yost MD [facility] 5 Surg Services [phone]       Follow-up Appointments    Below is a list of your follow-up and future appointments.  This may not be a complete list as you may have made appointments directly with providers that we are not aware of or your providers may have made some for you. Please call your providers to confirm appointments. It is important to keep your appointments. Please bring your current insurance card, photo ID, co-pay, and all medication bottles to your appointment. If self-pay, payment is expected at the time of service. Follow-up Information     Follow up with Dr. Prosper Valle M.D., MD .    Specialty:  [facility] Medicine    Contact information:    [place] [address]  [phone]          Follow up with Adela Perkins MD On 6/15/2017. Specialty:  Orthopedic Surgery    Why:  For postoperative radiographs, For wound re-checkAppointment time is at 9:40 AM    Contact information:    [address]  [place] [phone]        Future Appointments     As directed     Nursing:  Zenaida Once You Return Home    This section includes instructions you will need to follow once you leave the hospital.  Your care team will discuss these with you, so you and those caring for you know how to best care for your health needs at home. This section may also include educational information about certain health topics that may be of help to you. [employer] Signup     [employer] allows you to send messages to your doctor, view your test results, renew your prescriptions, schedule appointments, view visit notes, and more. How Do I Sign Up? 1. In your Internet browser, go to [email or web address]  2. Click on the Sign Up Now link in the Sign In box. You will see the New Member Sign Up page. 3. Enter your [employer] Access Code exactly as it appears below. You will not need to use this code after youve completed the sign-up process. If you do not sign up before the expiration date, you must request a new code. Stepcase Access Code: [email or web address]  Expires: 7/17/2017  8:37 AM    4. Enter your Social Security Number ([id number]) and Date of Birth (mm/dd/yyyy) as indicated and click Submit. You will be taken to the next sign-up page. 5. Create a BlisMediat ID. This will be your Stepcase login ID and cannot be changed, so think of one that is secure and easy to remember. 6. Create a Stepcase password. You can change your password at any time. 7. Enter your Password Reset Question and Answer. This can be used at a later time if you forget your password. 8. Enter your e-mail address. You will receive e-mail notification when new information is available in [address] 9. Click Sign Up. You can now view your medical record. Additional Information  If you have questions, please contact the physician practice where you receive care. Remember, Stepcase is NOT to be used for urgent needs. For medical emergencies, dial 911. For questions regarding your [employer] account call [phone]. If you have a clinical question, please call your doctor's office. View your information online  ? Review your current list of  medications, immunization, and allergies. ? Review your future test results online . ? Review your discharge instructions provided by your caregivers at discharge    Certain functionality such as prescription refills, scheduling appointments or sending messages to your provider are not activated if your provider does not use [employer] in his/her office    For questions regarding your Stepcase account call [phone]. If you have a clinical question, please call your doctor's office. The information on all pages of the After Visit Summary has been reviewed with me, the patient and/or responsible adult, by my health care provider(s). I had the opportunity to ask questions regarding this information.  I understand I should dispose of my armband safely at home to · You are not getting better as expected. Where can you learn more? Go to [email or web address] and sign in to your [employer] account. Enter G140 in the TEXbase box to learn more about \"Lumbar Spinal Fusion: What to Expect at Home. \"     If you do not have an account, please click on the \"Sign Up Now\" link. Current as of: May 23, 2016  Content Version: 11.2  © 6019-6989 [employer]. Care instructions adapted under license by [facility] ([facility]). If you have questions about a medical condition or this instruction, always ask your healthcare professional. [address] any warranty or liability for your use of this information.

## 2017-06-01 NOTE — PROGRESS NOTES
Occupational Therapy   Occupational Therapy Initial Assessment  Date: 2017   Patient Name: Nicolle Armenta  MRN: [id number]     : 1975    Past Medical History:   Diagnosis Date    Arthritis     DDD (degenerative disc disease), lumbar 2017    Hyperlipidemia     Hypertension     Kidney stones     Osteoarthritis of spine with radiculopathy, lumbar region 2017     Past Surgical History:   Procedure Laterality Date    CHOLECYSTECTOMY      LUMBAR FUSION Left 2017    LUMBAR INTERBODY FUSION LATERAL LEFT L4-5 performed by Ryann [facility]an MD at [facility] OR       Treatment Diagnosis: L4-S1 fusion      Restrictions  Restrictions/Precautions  Required Braces or Orthoses?: Yes  Required Braces or Orthoses  Spinal: Lumbar Corset  Position Activity Restriction  Spinal Precautions: No Bending, No Lifting, No Twisting    Subjective   General  Additional Pertinent Hx: Pt. had first part of surgery 2 weeks ago. Family / Caregiver Present: Yes  Diagnosis: L4-S1 compression fx  General Comment  Comments: Pt. sitting on EOB, wanting to get up to recliner.   Pain Assessment  Patient Currently in Pain: Yes  Pain Assessment: 0-10  Pain Level: 10  Pain Type: Surgical pain  Pain Location: Back  Pain Orientation: Lower  Pain Descriptors: Aching  Pain Frequency: Continuous  Pain Intervention(s): Medication (see eMar)  Response to Pain Intervention: Asleep with RR greater than 10  Oxygen Therapy  SpO2: 96 %  O2 Device: Nasal cannula  Social/Functional History  Social/Functional History  Lives With: Spouse  Type of Home: House  Home Layout: One level  Bathroom Toilet: Bedside commode  Home Equipment: Rolling walker  ADL Assistance: Independent  Ambulation Assistance: Independent  Transfer Assistance: Independent       Objective   Vision: Within Functional Limits  Hearing: Within functional limits    Orientation  Overall Orientation Status: Within Normal Limits     Standing Balance  Sit to stand: Minimal

## 2017-06-02 LAB
ANION GAP SERPL CALCULATED.3IONS-SCNC: 17 MMOL/L (ref 7–19)
BUN BLDV-MCNC: 16 MG/DL (ref 6–20)
CALCIUM SERPL-MCNC: 8.5 MG/DL (ref 8.6–10)
CHLORIDE BLD-SCNC: 100 MMOL/L (ref 98–111)
CO2: 21 MMOL/L (ref 22–29)
CREAT SERPL-MCNC: 1 MG/DL (ref 0.5–1.2)
GFR NON-AFRICAN AMERICAN: >60
GLUCOSE BLD-MCNC: 147 MG/DL (ref 74–109)
HCT VFR BLD CALC: 36.1 % (ref 42–52)
HEMOGLOBIN: 12.2 G/DL (ref 14–18)
MCH RBC QN AUTO: 30 PG (ref 27–31)
MCHC RBC AUTO-ENTMCNC: 33.8 G/DL (ref 33–37)
MCV RBC AUTO: 88.9 FL (ref 80–94)
PDW BLD-RTO: 12.2 % (ref 11.5–14.5)
PLATELET # BLD: 266 K/UL (ref 130–400)
PMV BLD AUTO: 10.4 FL (ref 9.4–12.4)
POTASSIUM SERPL-SCNC: 4.6 MMOL/L (ref 3.5–5)
RBC # BLD: 4.06 M/UL (ref 4.7–6.1)
SODIUM BLD-SCNC: 138 MMOL/L (ref 136–145)
WBC # BLD: 14.1 K/UL (ref 4.8–10.8)

## 2017-06-02 PROCEDURE — 97161 PT EVAL LOW COMPLEX 20 MIN: CPT

## 2017-06-02 PROCEDURE — 80048 BASIC METABOLIC PNL TOTAL CA: CPT

## 2017-06-02 PROCEDURE — 97116 GAIT TRAINING THERAPY: CPT

## 2017-06-02 PROCEDURE — 94762 N-INVAS EAR/PLS OXIMTRY CONT: CPT

## 2017-06-02 PROCEDURE — 97535 SELF CARE MNGMENT TRAINING: CPT

## 2017-06-02 PROCEDURE — 6360000002 HC RX W HCPCS: Performed by: PHYSICIAN ASSISTANT

## 2017-06-02 PROCEDURE — 1210000000 HC MED SURG R&B

## 2017-06-02 PROCEDURE — 85027 COMPLETE CBC AUTOMATED: CPT

## 2017-06-02 PROCEDURE — G8978 MOBILITY CURRENT STATUS: HCPCS

## 2017-06-02 PROCEDURE — 2580000003 HC RX 258: Performed by: PHYSICIAN ASSISTANT

## 2017-06-02 PROCEDURE — G8979 MOBILITY GOAL STATUS: HCPCS

## 2017-06-02 PROCEDURE — 6370000000 HC RX 637 (ALT 250 FOR IP): Performed by: PHYSICIAN ASSISTANT

## 2017-06-02 PROCEDURE — 36415 COLL VENOUS BLD VENIPUNCTURE: CPT

## 2017-06-02 PROCEDURE — 97530 THERAPEUTIC ACTIVITIES: CPT

## 2017-06-02 RX ADMIN — Medication 10 ML: at 15:19

## 2017-06-02 RX ADMIN — OXYCODONE HYDROCHLORIDE AND ACETAMINOPHEN 2 TABLET: 10; 325 TABLET ORAL at 03:38

## 2017-06-02 RX ADMIN — OXYCODONE HYDROCHLORIDE AND ACETAMINOPHEN 2 TABLET: 10; 325 TABLET ORAL at 13:33

## 2017-06-02 RX ADMIN — OXYCODONE HYDROCHLORIDE AND ACETAMINOPHEN 2 TABLET: 10; 325 TABLET ORAL at 08:05

## 2017-06-02 RX ADMIN — DIAZEPAM 5 MG: 5 TABLET ORAL at 20:04

## 2017-06-02 RX ADMIN — OXYCODONE HYDROCHLORIDE AND ACETAMINOPHEN 2 TABLET: 10; 325 TABLET ORAL at 22:36

## 2017-06-02 RX ADMIN — HYDROMORPHONE HYDROCHLORIDE 1 MG: 1 INJECTION, SOLUTION INTRAMUSCULAR; INTRAVENOUS; SUBCUTANEOUS at 05:33

## 2017-06-02 RX ADMIN — HYDROMORPHONE HYDROCHLORIDE 1 MG: 1 INJECTION, SOLUTION INTRAMUSCULAR; INTRAVENOUS; SUBCUTANEOUS at 09:06

## 2017-06-02 RX ADMIN — HYDROMORPHONE HYDROCHLORIDE 1 MG: 1 INJECTION, SOLUTION INTRAMUSCULAR; INTRAVENOUS; SUBCUTANEOUS at 20:04

## 2017-06-02 RX ADMIN — LISINOPRIL 5 MG: 5 TABLET ORAL at 08:05

## 2017-06-02 RX ADMIN — SIMVASTATIN 20 MG: 20 TABLET, FILM COATED ORAL at 20:04

## 2017-06-02 RX ADMIN — DIAZEPAM 5 MG: 5 TABLET ORAL at 03:38

## 2017-06-02 RX ADMIN — GABAPENTIN 300 MG: 300 CAPSULE ORAL at 08:05

## 2017-06-02 RX ADMIN — HYDROMORPHONE HYDROCHLORIDE 1 MG: 1 INJECTION, SOLUTION INTRAMUSCULAR; INTRAVENOUS; SUBCUTANEOUS at 00:52

## 2017-06-02 RX ADMIN — GABAPENTIN 300 MG: 300 CAPSULE ORAL at 13:33

## 2017-06-02 RX ADMIN — Medication 10 ML: at 08:04

## 2017-06-02 RX ADMIN — CEFAZOLIN SODIUM 1 G: 1 INJECTION, SOLUTION INTRAVENOUS at 00:56

## 2017-06-02 RX ADMIN — Medication 10 ML: at 21:00

## 2017-06-02 RX ADMIN — HYDROMORPHONE HYDROCHLORIDE 1 MG: 1 INJECTION, SOLUTION INTRAMUSCULAR; INTRAVENOUS; SUBCUTANEOUS at 15:13

## 2017-06-02 RX ADMIN — DOCUSATE SODIUM 100 MG: 100 CAPSULE, LIQUID FILLED ORAL at 08:05

## 2017-06-02 RX ADMIN — OXYCODONE HYDROCHLORIDE AND ACETAMINOPHEN 2 TABLET: 10; 325 TABLET ORAL at 18:19

## 2017-06-02 RX ADMIN — GABAPENTIN 300 MG: 300 CAPSULE ORAL at 20:04

## 2017-06-02 ASSESSMENT — PAIN SCALES - GENERAL
PAINLEVEL_OUTOF10: 7
PAINLEVEL_OUTOF10: 6
PAINLEVEL_OUTOF10: 10
PAINLEVEL_OUTOF10: 7
PAINLEVEL_OUTOF10: 0
PAINLEVEL_OUTOF10: 10
PAINLEVEL_OUTOF10: 0
PAINLEVEL_OUTOF10: 8
PAINLEVEL_OUTOF10: 8
PAINLEVEL_OUTOF10: 0
PAINLEVEL_OUTOF10: 4
PAINLEVEL_OUTOF10: 7
PAINLEVEL_OUTOF10: 10
PAINLEVEL_OUTOF10: 4
PAINLEVEL_OUTOF10: 4

## 2017-06-02 ASSESSMENT — PAIN DESCRIPTION - LOCATION: LOCATION: BACK

## 2017-06-02 ASSESSMENT — PAIN DESCRIPTION - DESCRIPTORS: DESCRIPTORS: ACHING

## 2017-06-02 ASSESSMENT — PAIN DESCRIPTION - FREQUENCY: FREQUENCY: CONTINUOUS

## 2017-06-02 ASSESSMENT — PAIN DESCRIPTION - PAIN TYPE: TYPE: SURGICAL PAIN

## 2017-06-02 ASSESSMENT — PAIN DESCRIPTION - ORIENTATION: ORIENTATION: LOWER

## 2017-06-02 NOTE — PROGRESS NOTES
[facility] SPINE SURGERY  Gonzalo Dorantes PA-C  Physician Assistant Progress Note        Subjective:  Doing well, leg pain improved.  Back pain controlled     Hospital LOS: 1    Vitals  VITALS:  BP 99/60  Pulse 82  Temp 98.6 °F (37 °C) (Temporal)   Resp 17  Ht 5' 9\" (1.753 m)  Wt 218 lb (98.9 kg)  SpO2 99%  BMI 32.19 kg/m2  24HR INTAKE/OUTPUT:    Intake/Output Summary (Last 24 hours) at 06/02/17 8029  Last data filed at 06/02/17 7416   Gross per 24 hour   Intake             3072 ml   Output              750 ml   Net             2322 ml       Current Facility-Administered Medications   Medication Dose Route Frequency Provider Last Rate Last Dose    gabapentin (NEURONTIN) capsule 300 mg  300 mg Oral TID Judd Fu, PA-C   300 mg at 06/02/17 0805    lisinopril (PRINIVIL;ZESTRIL) tablet 5 mg  5 mg Oral Daily Judd Fu, PA-C   5 mg at 06/02/17 0805    simvastatin (ZOCOR) tablet 20 mg  20 mg Oral Nightly Judd Fu, PA-C   20 mg at 06/01/17 2013    0.9 % sodium chloride infusion   Intravenous Continuous Judd Fu, PA-C 100 mL/hr at 06/01/17 1218      sodium chloride flush 0.9 % injection 10 mL  10 mL Intravenous 2 times per day Judd Fu, PA-C   10 mL at 06/02/17 0804    sodium chloride flush 0.9 % injection 10 mL  10 mL Intravenous PRN Judd Fu, PA-C        acetaminophen (TYLENOL) tablet 650 mg  650 mg Oral Q4H PRN Judd Fu, PA-C        HYDROmorphone (DILAUDID) injection 0.5 mg  0.5 mg Intravenous Q3H PRN Judd Fu, PA-C        Or    HYDROmorphone (DILAUDID) injection 1 mg  1 mg Intravenous Q3H PRN Judd Fu, PA-C   1 mg at 06/02/17 0071    docusate sodium (COLACE) capsule 100 mg  100 mg Oral Daily Judd Fu, PA-C   100 mg at 06/02/17 0805    polyethylene glycol (GLYCOLAX) packet 17 g  17 g Oral Daily PRN Judd Fu, PA-C        ondansetron [facility]) injection 4 mg  4 mg Intravenous Q6H PRN Cori Turpin TARA Ugalde   4 mg at 06/01/17 1216    diazepam (VALIUM) tablet 5 mg  5 mg Oral Q6H PRN Van Licona, PA-C   5 mg at 06/02/17 3166    oxyCODONE-acetaminophen (PERCOCET)  MG per tablet 1 tablet  1 tablet Oral Q4H PRN Van Licona, PA-C   1 tablet at 06/01/17 1710    Or    oxyCODONE-acetaminophen (PERCOCET)  MG per tablet 2 tablet  2 tablet Oral Q4H PRN Van Licona, PA-C   2 tablet at 06/02/17 0805       PHYSICAL EXAM:    Orientation:  alert and oriented to person, place and time    Incision:  dressing in place, clean, dry and intact    Upper Extremity Motor :  deltoids/biceps/triceps/wirst flexion/wrist extension/finger flexion/finger extension 5/5 bilaterally    Upper Motor Neuron Signs:  None    Upper Extremity Sensory:  Intact C3-T1 distribution    Lower Extremity Motor :  quadriceps, extensor hallucis longus, dorsiflexion, plantarflexion 5/5 bilaterally    Lower Extremity Sensory:  Intact L1-S1    Flatus:  positive    ABNORMAL EXAM FINDINGS:  none    LABS:    CBC:   Lab Results   Component Value Date    WBC 14.1 06/02/2017    RBC 4.06 06/02/2017    HGB 12.2 06/02/2017    HCT 36.1 06/02/2017    MCV 88.9 06/02/2017    MCH 30.0 06/02/2017    MCHC 33.8 06/02/2017    RDW 12.2 06/02/2017     06/02/2017    MPV 10.4 06/02/2017     CMP:    Lab Results   Component Value Date     06/02/2017    K 4.6 06/02/2017     06/02/2017    CO2 21 06/02/2017    BUN 16 06/02/2017    CREATININE 1.0 06/02/2017    LABGLOM >60 06/02/2017    GLUCOSE 147 06/02/2017    PROT 7.0 04/24/2017    LABALBU 4.4 04/24/2017    CALCIUM 8.5 06/02/2017    BILITOT 0.3 04/24/2017    ALKPHOS 69 04/24/2017    AST 26 04/24/2017    ALT 45 04/24/2017       ASSESSMENT AND PLAN:    Patient Active Problem List    Diagnosis Date Noted    DDD (degenerative disc disease), lumbar 05/16/2017    Osteoarthritis of spine with radiculopathy, lumbar region 05/16/2017       Post operative day 1 status post PSF

## 2017-06-02 NOTE — PROGRESS NOTES
Physical Therapy  Leah Underwood  [id number]     06/02/17 1412   Subjective   Subjective Attempt this PM, patient in bed resting and states he recently had pain meds and is very drowsy at this time. Patient did well with therapy earlier today.    Electronically signed by Ashely Brooks PTA on 6/2/2017 at 2:13 PM

## 2017-06-02 NOTE — PROGRESS NOTES
Physical Therapy  Simonemichaelingris Carrs  [id number]     06/02/17 1058   Subjective   Subjective Agrees to work with therapy    Bed Mobility   Supine to Sit Modified independent   Sit to Supine Modified independent   Transfers   Sit to Stand Modified independent   Stand to sit Modified independent   Ambulation   Ambulation? Yes   Ambulation 1   Surface level tile   Device Rolling Walker   Other Apparatus (LSO)   Assistance Contact guard assistance   Distance 350'   Comments tolerated increased gait distance this treatment   Other Activities   Comment Assisted patient in/out of BR after ambulating in [place]. Patient able to perform self care in BR, tolerated standing at sink to wash his hands. Patient requested to return to bed after treatment. Patient positioned for comfort with all needs in reach. Short term goals   Time Frame for Short term goals 2 weeks   Short term goal 1 Independent with bed mobility and all transfers   Short term goal 2 Ambulate 400 feet independently without assistive device   Activity Tolerance   Activity Tolerance Patient Tolerated treatment well   Safety Devices   Type of devices Bed alarm in place;Call light within reach; Left in bed  (all needs in reach)   Electronically signed by Moira Lorenzo PTA on 6/2/2017 at 11:03 AM

## 2017-06-03 PROCEDURE — 97116 GAIT TRAINING THERAPY: CPT

## 2017-06-03 PROCEDURE — 6370000000 HC RX 637 (ALT 250 FOR IP): Performed by: PHYSICIAN ASSISTANT

## 2017-06-03 PROCEDURE — 1210000000 HC MED SURG R&B

## 2017-06-03 PROCEDURE — 6360000002 HC RX W HCPCS: Performed by: PHYSICIAN ASSISTANT

## 2017-06-03 PROCEDURE — 94762 N-INVAS EAR/PLS OXIMTRY CONT: CPT

## 2017-06-03 PROCEDURE — 2580000003 HC RX 258: Performed by: PHYSICIAN ASSISTANT

## 2017-06-03 RX ORDER — MORPHINE SULFATE 30 MG/1
30 TABLET, FILM COATED, EXTENDED RELEASE ORAL EVERY 12 HOURS SCHEDULED
Status: DISCONTINUED | OUTPATIENT
Start: 2017-06-03 | End: 2017-06-04

## 2017-06-03 RX ADMIN — DIAZEPAM 5 MG: 5 TABLET ORAL at 04:53

## 2017-06-03 RX ADMIN — SIMVASTATIN 20 MG: 20 TABLET, FILM COATED ORAL at 21:17

## 2017-06-03 RX ADMIN — OXYCODONE HYDROCHLORIDE AND ACETAMINOPHEN 2 TABLET: 10; 325 TABLET ORAL at 21:17

## 2017-06-03 RX ADMIN — MORPHINE SULFATE 30 MG: 30 TABLET, EXTENDED RELEASE ORAL at 21:17

## 2017-06-03 RX ADMIN — HYDROMORPHONE HYDROCHLORIDE 1 MG: 1 INJECTION, SOLUTION INTRAMUSCULAR; INTRAVENOUS; SUBCUTANEOUS at 08:35

## 2017-06-03 RX ADMIN — OXYCODONE HYDROCHLORIDE AND ACETAMINOPHEN 2 TABLET: 10; 325 TABLET ORAL at 16:44

## 2017-06-03 RX ADMIN — DIAZEPAM 5 MG: 5 TABLET ORAL at 12:08

## 2017-06-03 RX ADMIN — HYDROMORPHONE HYDROCHLORIDE 1 MG: 1 INJECTION, SOLUTION INTRAMUSCULAR; INTRAVENOUS; SUBCUTANEOUS at 01:26

## 2017-06-03 RX ADMIN — ONDANSETRON 4 MG: 2 INJECTION INTRAMUSCULAR; INTRAVENOUS at 10:30

## 2017-06-03 RX ADMIN — ACETAMINOPHEN 650 MG: 325 TABLET, FILM COATED ORAL at 12:07

## 2017-06-03 RX ADMIN — OXYCODONE HYDROCHLORIDE AND ACETAMINOPHEN 2 TABLET: 10; 325 TABLET ORAL at 12:35

## 2017-06-03 RX ADMIN — MORPHINE SULFATE 30 MG: 30 TABLET, EXTENDED RELEASE ORAL at 10:18

## 2017-06-03 RX ADMIN — LISINOPRIL 5 MG: 5 TABLET ORAL at 10:18

## 2017-06-03 RX ADMIN — OXYCODONE HYDROCHLORIDE AND ACETAMINOPHEN 2 TABLET: 10; 325 TABLET ORAL at 04:53

## 2017-06-03 RX ADMIN — Medication 10 ML: at 21:18

## 2017-06-03 RX ADMIN — DOCUSATE SODIUM 100 MG: 100 CAPSULE, LIQUID FILLED ORAL at 08:42

## 2017-06-03 RX ADMIN — GABAPENTIN 300 MG: 300 CAPSULE ORAL at 08:41

## 2017-06-03 RX ADMIN — GABAPENTIN 300 MG: 300 CAPSULE ORAL at 21:17

## 2017-06-03 RX ADMIN — GABAPENTIN 300 MG: 300 CAPSULE ORAL at 15:47

## 2017-06-03 RX ADMIN — Medication 10 ML: at 08:42

## 2017-06-03 RX ADMIN — DIAZEPAM 5 MG: 5 TABLET ORAL at 21:17

## 2017-06-03 ASSESSMENT — PAIN SCALES - GENERAL
PAINLEVEL_OUTOF10: 6
PAINLEVEL_OUTOF10: 5
PAINLEVEL_OUTOF10: 10
PAINLEVEL_OUTOF10: 0
PAINLEVEL_OUTOF10: 6
PAINLEVEL_OUTOF10: 8
PAINLEVEL_OUTOF10: 4
PAINLEVEL_OUTOF10: 4
PAINLEVEL_OUTOF10: 10
PAINLEVEL_OUTOF10: 9
PAINLEVEL_OUTOF10: 6
PAINLEVEL_OUTOF10: 10
PAINLEVEL_OUTOF10: 6
PAINLEVEL_OUTOF10: 6
PAINLEVEL_OUTOF10: 8

## 2017-06-03 NOTE — PROGRESS NOTES
[facility]) injection 4 mg  4 mg Intravenous Q6H PRN Earma Carmelo, PA-C   4 mg at 06/01/17 1216    diazepam (VALIUM) tablet 5 mg  5 mg Oral Q6H PRN Earma Carmelo, PA-C   5 mg at 06/03/17 0453    oxyCODONE-acetaminophen (PERCOCET)  MG per tablet 1 tablet  1 tablet Oral Q4H PRN Earma Carmelo, PA-C   1 tablet at 06/01/17 1710    Or    oxyCODONE-acetaminophen (PERCOCET)  MG per tablet 2 tablet  2 tablet Oral Q4H PRN Earma Carmelo, PA-C   2 tablet at 06/03/17 0453       PHYSICAL EXAM:    Orientation:  alert and oriented to person, place and time    Incision:  dressing in place, clean, dry and intact    Upper Extremity Motor :  deltoids/biceps/triceps/wirst flexion/wrist extension/finger flexion/finger extension 5/5 bilaterally    Upper Motor Neuron Signs:  None    Upper Extremity Sensory:  Intact C3-T1 distribution    Lower Extremity Motor :  quadriceps, extensor hallucis longus, dorsiflexion, plantarflexion 5/5 bilaterally, except for left hip flexor at 4/5    Lower Extremity Sensory:  Intact L1-S1    Flatus:  positive    ABNORMAL EXAM FINDINGS:  Mild left hip flexor weakness    LABS:    CBC:   Lab Results   Component Value Date    WBC 14.1 06/02/2017    RBC 4.06 06/02/2017    HGB 12.2 06/02/2017    HCT 36.1 06/02/2017    MCV 88.9 06/02/2017    MCH 30.0 06/02/2017    MCHC 33.8 06/02/2017    RDW 12.2 06/02/2017     06/02/2017    MPV 10.4 06/02/2017     BMP:    Lab Results   Component Value Date     06/02/2017    K 4.6 06/02/2017     06/02/2017    CO2 21 06/02/2017    BUN 16 06/02/2017    LABALBU 4.4 04/24/2017    CREATININE 1.0 06/02/2017    CALCIUM 8.5 06/02/2017    LABGLOM >60 06/02/2017    GLUCOSE 147 06/02/2017       ASSESSMENT AND PLAN:    Patient Active Problem List    Diagnosis Date Noted    DDD (degenerative disc disease), lumbar 05/16/2017    Osteoarthritis of spine with radiculopathy, lumbar region 05/16/2017       Post operative day 2 status post TLIF L5/S1, PSF L4-S1    1:  Activity Level:  Progressive, OOB with brace  2:  Pain Control:  Wean to oral analgesia, add MS Contin for long acting oral analgesic  3:  Discharge Planning:   This PM if adequate pain control  4:  Other:  Two week follow up      Electronically signed by Brennen Clifton PA-C on 6/3/17 at 9:25 AM

## 2017-06-03 NOTE — PROGRESS NOTES
Physical Therapy  Name: Tee Tyson  MRN:  [id number]  Date of service:  6/3/2017       06/03/17 1513   General   Chart Reviewed Yes   Subjective   Subjective I feel some better. I will try to walk   Bed Mobility   Comment sitting bedside   Transfers   Sit to Stand Independent   Stand to sit Independent   Ambulation   Ambulation?  Yes   Ambulation 1   Surface level tile   Device Rolling Walker   Assistance Contact guard assistance   Distance 250'   Comments tolerated increased gait distance this treatment   Balance   Posture Good   Sitting - Static Good   Sitting - Dynamic Good   Standing - Static Good   Standing - Dynamic Good;-   Short term goals   Time Frame for Short term goals 2 weeks   Short term goal 1 Independent with bed mobility and all transfers   Short term goal 2 Ambulate 400 feet independently without assistive device   Activity Tolerance   Activity Tolerance Patient Tolerated treatment well   Plan   Times per week 7   Times per day Daily   Safety Devices   Type of devices Call light within reach         Electronically signed by Dimitris Gore PTA on 6/3/2017 at 3:17 PM

## 2017-06-03 NOTE — PLAN OF CARE
Problem: Falls - Risk of  Goal: Absence of falls  Outcome: Ongoing    Problem: Pain:  Goal: Pain level will decrease  Pain level will decrease   Outcome: Ongoing

## 2017-06-04 ENCOUNTER — APPOINTMENT (OUTPATIENT)
Dept: GENERAL RADIOLOGY | Age: 42
DRG: 455 | End: 2017-06-04
Payer: MEDICAID

## 2017-06-04 PROCEDURE — 97116 GAIT TRAINING THERAPY: CPT

## 2017-06-04 PROCEDURE — 94762 N-INVAS EAR/PLS OXIMTRY CONT: CPT

## 2017-06-04 PROCEDURE — 6360000002 HC RX W HCPCS: Performed by: PHYSICIAN ASSISTANT

## 2017-06-04 PROCEDURE — 6370000000 HC RX 637 (ALT 250 FOR IP): Performed by: PHYSICIAN ASSISTANT

## 2017-06-04 PROCEDURE — 6360000002 HC RX W HCPCS

## 2017-06-04 PROCEDURE — 74000 XR ABDOMEN LIMITED (KUB): CPT

## 2017-06-04 PROCEDURE — 94640 AIRWAY INHALATION TREATMENT: CPT

## 2017-06-04 PROCEDURE — 2580000003 HC RX 258: Performed by: PHYSICIAN ASSISTANT

## 2017-06-04 PROCEDURE — 1210000000 HC MED SURG R&B

## 2017-06-04 PROCEDURE — 71010 XR CHEST PORTABLE: CPT

## 2017-06-04 RX ORDER — PROMETHAZINE HYDROCHLORIDE 25 MG/ML
6.25 INJECTION, SOLUTION INTRAMUSCULAR; INTRAVENOUS EVERY 6 HOURS PRN
Status: DISCONTINUED | OUTPATIENT
Start: 2017-06-04 | End: 2017-06-05 | Stop reason: HOSPADM

## 2017-06-04 RX ORDER — OXYCODONE AND ACETAMINOPHEN 10; 325 MG/1; MG/1
1 TABLET ORAL EVERY 6 HOURS PRN
Status: DISCONTINUED | OUTPATIENT
Start: 2017-06-04 | End: 2017-06-05 | Stop reason: HOSPADM

## 2017-06-04 RX ORDER — IPRATROPIUM BROMIDE AND ALBUTEROL SULFATE 2.5; .5 MG/3ML; MG/3ML
1 SOLUTION RESPIRATORY (INHALATION) 2 TIMES DAILY
Status: DISCONTINUED | OUTPATIENT
Start: 2017-06-05 | End: 2017-06-05 | Stop reason: HOSPADM

## 2017-06-04 RX ORDER — BISACODYL 10 MG
10 SUPPOSITORY, RECTAL RECTAL DAILY PRN
Status: DISCONTINUED | OUTPATIENT
Start: 2017-06-04 | End: 2017-06-05 | Stop reason: HOSPADM

## 2017-06-04 RX ORDER — IPRATROPIUM BROMIDE AND ALBUTEROL SULFATE 2.5; .5 MG/3ML; MG/3ML
1 SOLUTION RESPIRATORY (INHALATION)
Status: DISCONTINUED | OUTPATIENT
Start: 2017-06-04 | End: 2017-06-04

## 2017-06-04 RX ORDER — 0.9 % SODIUM CHLORIDE 0.9 %
500 INTRAVENOUS SOLUTION INTRAVENOUS ONCE
Status: DISCONTINUED | OUTPATIENT
Start: 2017-06-04 | End: 2017-06-04

## 2017-06-04 RX ORDER — PROMETHAZINE HYDROCHLORIDE 25 MG/1
12.5 TABLET ORAL EVERY 6 HOURS PRN
Status: DISCONTINUED | OUTPATIENT
Start: 2017-06-04 | End: 2017-06-05 | Stop reason: HOSPADM

## 2017-06-04 RX ORDER — MEPERIDINE HYDROCHLORIDE 50 MG/ML
50 INJECTION INTRAMUSCULAR; INTRAVENOUS; SUBCUTANEOUS EVERY 4 HOURS PRN
Status: DISCONTINUED | OUTPATIENT
Start: 2017-06-04 | End: 2017-06-05 | Stop reason: HOSPADM

## 2017-06-04 RX ORDER — METOCLOPRAMIDE HYDROCHLORIDE 5 MG/5ML
5 SOLUTION ORAL
Status: DISCONTINUED | OUTPATIENT
Start: 2017-06-05 | End: 2017-06-05 | Stop reason: HOSPADM

## 2017-06-04 RX ORDER — 0.9 % SODIUM CHLORIDE 0.9 %
1000 INTRAVENOUS SOLUTION INTRAVENOUS ONCE
Status: COMPLETED | OUTPATIENT
Start: 2017-06-04 | End: 2017-06-04

## 2017-06-04 RX ORDER — MEPERIDINE HYDROCHLORIDE 50 MG/1
50 TABLET ORAL EVERY 4 HOURS PRN
Status: DISCONTINUED | OUTPATIENT
Start: 2017-06-04 | End: 2017-06-05 | Stop reason: HOSPADM

## 2017-06-04 RX ADMIN — GABAPENTIN 300 MG: 300 CAPSULE ORAL at 16:56

## 2017-06-04 RX ADMIN — GABAPENTIN 300 MG: 300 CAPSULE ORAL at 07:54

## 2017-06-04 RX ADMIN — OXYCODONE HYDROCHLORIDE AND ACETAMINOPHEN 2 TABLET: 10; 325 TABLET ORAL at 05:46

## 2017-06-04 RX ADMIN — MAGESIUM CITRATE 296 ML: 1.75 LIQUID ORAL at 14:28

## 2017-06-04 RX ADMIN — DIAZEPAM 5 MG: 5 TABLET ORAL at 05:46

## 2017-06-04 RX ADMIN — GABAPENTIN 300 MG: 300 CAPSULE ORAL at 22:11

## 2017-06-04 RX ADMIN — OXYCODONE HYDROCHLORIDE AND ACETAMINOPHEN 2 TABLET: 10; 325 TABLET ORAL at 01:25

## 2017-06-04 RX ADMIN — OXYCODONE HYDROCHLORIDE AND ACETAMINOPHEN 2 TABLET: 10; 325 TABLET ORAL at 16:56

## 2017-06-04 RX ADMIN — IPRATROPIUM BROMIDE AND ALBUTEROL SULFATE 1 AMPULE: .5; 3 SOLUTION RESPIRATORY (INHALATION) at 18:28

## 2017-06-04 RX ADMIN — PROMETHAZINE HYDROCHLORIDE 6.25 MG: 25 INJECTION INTRAMUSCULAR; INTRAVENOUS at 14:20

## 2017-06-04 RX ADMIN — OXYCODONE HYDROCHLORIDE AND ACETAMINOPHEN 2 TABLET: 10; 325 TABLET ORAL at 11:23

## 2017-06-04 RX ADMIN — LISINOPRIL 5 MG: 5 TABLET ORAL at 07:54

## 2017-06-04 RX ADMIN — SODIUM CHLORIDE 1000 ML: 9 INJECTION, SOLUTION INTRAVENOUS at 13:45

## 2017-06-04 RX ADMIN — ONDANSETRON 4 MG: 2 INJECTION INTRAMUSCULAR; INTRAVENOUS at 11:26

## 2017-06-04 RX ADMIN — Medication 10 ML: at 07:56

## 2017-06-04 RX ADMIN — SIMVASTATIN 20 MG: 20 TABLET, FILM COATED ORAL at 22:11

## 2017-06-04 RX ADMIN — METHYLNALTREXONE BROMIDE 12 MG: 12 INJECTION, SOLUTION SUBCUTANEOUS at 22:11

## 2017-06-04 RX ADMIN — POLYETHYLENE GLYCOL 3350 17 G: 17 POWDER, FOR SOLUTION ORAL at 22:10

## 2017-06-04 RX ADMIN — DOCUSATE SODIUM 100 MG: 100 CAPSULE, LIQUID FILLED ORAL at 07:54

## 2017-06-04 RX ADMIN — ENOXAPARIN SODIUM 40 MG: 40 INJECTION SUBCUTANEOUS at 14:28

## 2017-06-04 RX ADMIN — IPRATROPIUM BROMIDE AND ALBUTEROL SULFATE 1 AMPULE: .5; 3 SOLUTION RESPIRATORY (INHALATION) at 14:09

## 2017-06-04 RX ADMIN — OXYCODONE HYDROCHLORIDE AND ACETAMINOPHEN 1 TABLET: 10; 325 TABLET ORAL at 23:40

## 2017-06-04 RX ADMIN — MORPHINE SULFATE 30 MG: 30 TABLET, EXTENDED RELEASE ORAL at 07:54

## 2017-06-04 RX ADMIN — ACETAMINOPHEN 650 MG: 325 TABLET, FILM COATED ORAL at 22:25

## 2017-06-04 RX ADMIN — BISACODYL 10 MG: 10 SUPPOSITORY RECTAL at 14:28

## 2017-06-04 RX ADMIN — Medication 10 ML: at 22:11

## 2017-06-04 ASSESSMENT — PAIN SCALES - GENERAL
PAINLEVEL_OUTOF10: 0
PAINLEVEL_OUTOF10: 10
PAINLEVEL_OUTOF10: 10
PAINLEVEL_OUTOF10: 4
PAINLEVEL_OUTOF10: 6
PAINLEVEL_OUTOF10: 4
PAINLEVEL_OUTOF10: 7
PAINLEVEL_OUTOF10: 6
PAINLEVEL_OUTOF10: 5
PAINLEVEL_OUTOF10: 4
PAINLEVEL_OUTOF10: 6
PAINLEVEL_OUTOF10: 0

## 2017-06-04 NOTE — PROGRESS NOTES
Physical Therapy  Name: Mi Jacob  MRN:  [id number]  Date of service:  6/4/2017    Patient nauseated and receiving IV fluids at this time. Wife states that patient maybe discharged home this evening if he begins feeling better. He declined ambulation at this time. Physical Therapy will continue to follow and progress as able.     Electronically signed by Jerman Leonardo PTA on 6/4/2017 at 1:42 PM

## 2017-06-05 VITALS
OXYGEN SATURATION: 98 % | WEIGHT: 218 LBS | TEMPERATURE: 99.8 F | RESPIRATION RATE: 16 BRPM | SYSTOLIC BLOOD PRESSURE: 138 MMHG | DIASTOLIC BLOOD PRESSURE: 80 MMHG | BODY MASS INDEX: 32.29 KG/M2 | HEART RATE: 110 BPM | HEIGHT: 69 IN

## 2017-06-05 PROCEDURE — 6370000000 HC RX 637 (ALT 250 FOR IP): Performed by: PHYSICIAN ASSISTANT

## 2017-06-05 PROCEDURE — 97116 GAIT TRAINING THERAPY: CPT

## 2017-06-05 PROCEDURE — 6360000002 HC RX W HCPCS: Performed by: PHYSICIAN ASSISTANT

## 2017-06-05 PROCEDURE — 97530 THERAPEUTIC ACTIVITIES: CPT

## 2017-06-05 PROCEDURE — 2580000003 HC RX 258: Performed by: PHYSICIAN ASSISTANT

## 2017-06-05 PROCEDURE — 6360000002 HC RX W HCPCS

## 2017-06-05 PROCEDURE — 94640 AIRWAY INHALATION TREATMENT: CPT

## 2017-06-05 RX ORDER — OXYCODONE AND ACETAMINOPHEN 10; 325 MG/1; MG/1
1-2 TABLET ORAL EVERY 6 HOURS PRN
Qty: 60 TABLET | Refills: 0
Start: 2017-06-05 | End: 2017-06-12

## 2017-06-05 RX ADMIN — GABAPENTIN 300 MG: 300 CAPSULE ORAL at 14:04

## 2017-06-05 RX ADMIN — ONDANSETRON 4 MG: 2 INJECTION INTRAMUSCULAR; INTRAVENOUS at 10:33

## 2017-06-05 RX ADMIN — MEPERIDINE HYDROCHLORIDE 50 MG: 50 TABLET ORAL at 03:11

## 2017-06-05 RX ADMIN — OXYCODONE HYDROCHLORIDE AND ACETAMINOPHEN 1 TABLET: 10; 325 TABLET ORAL at 14:04

## 2017-06-05 RX ADMIN — GABAPENTIN 300 MG: 300 CAPSULE ORAL at 09:54

## 2017-06-05 RX ADMIN — Medication 10 ML: at 09:58

## 2017-06-05 RX ADMIN — METHYLNALTREXONE BROMIDE 12 MG: 12 INJECTION, SOLUTION SUBCUTANEOUS at 09:54

## 2017-06-05 RX ADMIN — LISINOPRIL 5 MG: 5 TABLET ORAL at 09:54

## 2017-06-05 RX ADMIN — IPRATROPIUM BROMIDE AND ALBUTEROL SULFATE 1 AMPULE: .5; 3 SOLUTION RESPIRATORY (INHALATION) at 06:14

## 2017-06-05 RX ADMIN — METOCLOPRAMIDE HYDROCHLORIDE 5 MG: 5 SOLUTION ORAL at 06:13

## 2017-06-05 RX ADMIN — OXYCODONE HYDROCHLORIDE AND ACETAMINOPHEN 1 TABLET: 10; 325 TABLET ORAL at 07:29

## 2017-06-05 RX ADMIN — ENOXAPARIN SODIUM 40 MG: 40 INJECTION SUBCUTANEOUS at 14:04

## 2017-06-05 RX ADMIN — PROMETHAZINE HYDROCHLORIDE 12.5 MG: 25 TABLET ORAL at 07:32

## 2017-06-05 ASSESSMENT — PAIN SCALES - GENERAL
PAINLEVEL_OUTOF10: 6
PAINLEVEL_OUTOF10: 5
PAINLEVEL_OUTOF10: 6
PAINLEVEL_OUTOF10: 9
PAINLEVEL_OUTOF10: 7
PAINLEVEL_OUTOF10: 3
PAINLEVEL_OUTOF10: 2

## 2017-06-05 NOTE — PROGRESS NOTES
Oral TID Stephanie Porter, PA-C   300 mg at 06/04/17 2211    lisinopril (PRINIVIL;ZESTRIL) tablet 5 mg  5 mg Oral Daily Stephanie Porter, PA-C   5 mg at 06/04/17 8083    simvastatin (ZOCOR) tablet 20 mg  20 mg Oral Nightly Stephanie Porter, PA-C   20 mg at 06/04/17 2211    sodium chloride flush 0.9 % injection 10 mL  10 mL Intravenous 2 times per day Stephanie Porter, PA-C   10 mL at 06/04/17 2211    sodium chloride flush 0.9 % injection 10 mL  10 mL Intravenous PRN Stephanie Porter, PA-C   10 mL at 06/02/17 1519    acetaminophen (TYLENOL) tablet 650 mg  650 mg Oral Q4H PRN Stephanie Porter, PA-C   650 mg at 06/04/17 2225    docusate sodium (COLACE) capsule 100 mg  100 mg Oral Daily Stephanie Porter, PA-C   100 mg at 06/04/17 7578    polyethylene glycol (GLYCOLAX) packet 17 g  17 g Oral Daily PRN Stephanie Porter, PA-C   17 g at 06/04/17 2210    ondansetron (ZOFRAN) injection 4 mg  4 mg Intravenous Q6H PRN Stephanie Porter, PA-C   4 mg at 06/04/17 1126       PHYSICAL EXAM:    Orientation:  alert and oriented to person, place and time    Incision:  dressing in place, clean, dry and intact    Upper Extremity Motor :  deltoids/biceps/triceps/wirst flexion/wrist extension/finger flexion/finger extension 5/5 bilaterally    Upper Motor Neuron Signs:  None    Upper Extremity Sensory:  Intact C3-T1 distribution    Lower Extremity Motor :  quadriceps, extensor hallucis longus, dorsiflexion, plantarflexion 5/5 bilaterally, except for left hip flexor at 4/5    Lower Extremity Sensory:  Intact L1-S1    Flatus:  positive    ABNORMAL EXAM FINDINGS:  Mild left hip flexor weakness    LABS:    CBC:   Lab Results   Component Value Date    WBC 14.1 06/02/2017    RBC 4.06 06/02/2017    HGB 12.2 06/02/2017    HCT 36.1 06/02/2017    MCV 88.9 06/02/2017    MCH 30.0 06/02/2017    MCHC 33.8 06/02/2017    RDW 12.2 06/02/2017     06/02/2017    MPV 10.4 06/02/2017     BMP:    Lab Results   Component

## 2017-12-14 ENCOUNTER — HOSPITAL ENCOUNTER (OUTPATIENT)
Dept: MRI IMAGING | Age: 42
Discharge: HOME | End: 2017-12-14
Payer: MEDICAID

## 2017-12-14 DIAGNOSIS — M54.16 LUMBAR RADICULOPATHY: ICD-10-CM

## 2017-12-14 PROCEDURE — 6360000004 HC RX CONTRAST MEDICATION: Performed by: PHYSICIAN ASSISTANT

## 2017-12-14 PROCEDURE — 72158 MRI LUMBAR SPINE W/O & W/DYE: CPT

## 2017-12-14 PROCEDURE — A9577 INJ MULTIHANCE: HCPCS | Performed by: PHYSICIAN ASSISTANT

## 2017-12-14 RX ADMIN — GADOBENATE DIMEGLUMINE 20 ML: 529 INJECTION, SOLUTION INTRAVENOUS at 11:25

## 2018-08-21 ENCOUNTER — HOSPITAL ENCOUNTER (OUTPATIENT)
Dept: MRI IMAGING | Age: 43
Discharge: HOME | End: 2018-08-21
Payer: MEDICAID

## 2018-08-21 DIAGNOSIS — M54.16 RADICULOPATHY, LUMBAR REGION: ICD-10-CM

## 2018-08-21 PROCEDURE — 72158 MRI LUMBAR SPINE W/O & W/DYE: CPT

## 2018-08-21 PROCEDURE — 6360000004 HC RX CONTRAST MEDICATION: Performed by: PHYSICIAN ASSISTANT

## 2018-08-21 PROCEDURE — A9577 INJ MULTIHANCE: HCPCS | Performed by: PHYSICIAN ASSISTANT

## 2018-08-21 RX ADMIN — GADOBENATE DIMEGLUMINE 19 ML: 529 INJECTION, SOLUTION INTRAVENOUS at 19:10

## 2018-11-09 RX ORDER — LISINOPRIL 5 MG/1
5 TABLET ORAL DAILY
Qty: 30 TABLET | Refills: 3 | Status: SHIPPED | OUTPATIENT
Start: 2018-11-09 | End: 2019-02-27 | Stop reason: SDUPTHER

## 2018-11-20 RX ORDER — NORTRIPTYLINE HYDROCHLORIDE 10 MG/1
10 CAPSULE ORAL NIGHTLY
COMMUNITY
End: 2019-04-12

## 2018-11-20 RX ORDER — CYCLOBENZAPRINE HCL 10 MG
10 TABLET ORAL AS NEEDED
COMMUNITY
End: 2019-09-16

## 2018-11-20 RX ORDER — OMEPRAZOLE 20 MG/1
20 CAPSULE, DELAYED RELEASE ORAL DAILY
COMMUNITY
End: 2019-04-12

## 2018-11-20 RX ORDER — DULOXETIN HYDROCHLORIDE 30 MG/1
30 CAPSULE, DELAYED RELEASE ORAL DAILY
COMMUNITY
End: 2020-05-06

## 2018-11-20 RX ORDER — GABAPENTIN 300 MG/1
300 CAPSULE ORAL 3 TIMES DAILY
COMMUNITY
End: 2019-04-12 | Stop reason: DRUGHIGH

## 2018-11-20 RX ORDER — SIMVASTATIN 20 MG
20 TABLET ORAL NIGHTLY
COMMUNITY
End: 2019-02-27 | Stop reason: SDUPTHER

## 2019-02-27 RX ORDER — LISINOPRIL 5 MG/1
TABLET ORAL
Qty: 30 TABLET | Refills: 3 | Status: SHIPPED | OUTPATIENT
Start: 2019-02-27 | End: 2019-03-04 | Stop reason: SDUPTHER

## 2019-02-27 RX ORDER — SIMVASTATIN 20 MG
TABLET ORAL
Qty: 30 TABLET | Refills: 5 | Status: SHIPPED | OUTPATIENT
Start: 2019-02-27 | End: 2019-08-07 | Stop reason: SDUPTHER

## 2019-03-04 RX ORDER — LISINOPRIL 5 MG/1
5 TABLET ORAL DAILY
Qty: 90 TABLET | Refills: 1 | Status: SHIPPED | OUTPATIENT
Start: 2019-03-04 | End: 2019-03-19 | Stop reason: SDUPTHER

## 2019-03-19 ENCOUNTER — TELEPHONE (OUTPATIENT)
Dept: FAMILY MEDICINE CLINIC | Facility: CLINIC | Age: 44
End: 2019-03-19

## 2019-03-19 DIAGNOSIS — I10 ESSENTIAL HYPERTENSION: Primary | ICD-10-CM

## 2019-03-20 RX ORDER — LISINOPRIL 5 MG/1
5 TABLET ORAL DAILY
Qty: 90 TABLET | Refills: 1 | Status: SHIPPED | OUTPATIENT
Start: 2019-03-20 | End: 2020-02-17 | Stop reason: SDUPTHER

## 2019-04-12 ENCOUNTER — OFFICE VISIT (OUTPATIENT)
Dept: FAMILY MEDICINE CLINIC | Facility: CLINIC | Age: 44
End: 2019-04-12

## 2019-04-12 VITALS
OXYGEN SATURATION: 97 % | SYSTOLIC BLOOD PRESSURE: 121 MMHG | BODY MASS INDEX: 36.13 KG/M2 | RESPIRATION RATE: 19 BRPM | HEIGHT: 68 IN | TEMPERATURE: 97.6 F | DIASTOLIC BLOOD PRESSURE: 77 MMHG | WEIGHT: 238.4 LBS | HEART RATE: 99 BPM

## 2019-04-12 DIAGNOSIS — I10 ESSENTIAL HYPERTENSION: Primary | ICD-10-CM

## 2019-04-12 DIAGNOSIS — E78.2 MIXED HYPERLIPIDEMIA: ICD-10-CM

## 2019-04-12 DIAGNOSIS — K21.9 GASTROESOPHAGEAL REFLUX DISEASE, ESOPHAGITIS PRESENCE NOT SPECIFIED: ICD-10-CM

## 2019-04-12 PROCEDURE — 99213 OFFICE O/P EST LOW 20 MIN: CPT | Performed by: NURSE PRACTITIONER

## 2019-04-12 RX ORDER — OMEPRAZOLE 20 MG/1
20 CAPSULE, DELAYED RELEASE ORAL DAILY
Qty: 30 CAPSULE | Refills: 2 | Status: SHIPPED | OUTPATIENT
Start: 2019-04-12 | End: 2019-07-05 | Stop reason: SDUPTHER

## 2019-04-12 RX ORDER — GABAPENTIN 600 MG/1
600 TABLET ORAL 3 TIMES DAILY PRN
Status: ON HOLD | COMMUNITY
Start: 2019-03-21 | End: 2021-06-17 | Stop reason: SDUPTHER

## 2019-04-12 RX ORDER — HYDROCODONE BITARTRATE AND ACETAMINOPHEN 10; 325 MG/1; MG/1
10-325 TABLET ORAL 3 TIMES DAILY PRN
Status: ON HOLD | COMMUNITY
Start: 2019-03-27 | End: 2021-06-17 | Stop reason: SDUPTHER

## 2019-04-12 NOTE — PROGRESS NOTES
Chief Complaint   Patient presents with   • Heartburn   • Hyperlipidemia     check up   • Hypertension     check up        Subjective   Jagdish Kulkarni is a 43 y.o.  male who presents today for follow up of chronic conditions.    HPI:  HEARTBURN:  His heartburn is worse than it has been in a long time.  He denies change in diet.  Bowel habits are unchanged.  He has taken zantac OTC with some mild relief.  He has taken omeprazole in the past.    HTN:  He is taking his medication as directed.  He has no complaints regarding his blood pressure.  He does get light headed at times.    HYPERLIPIDEMIA:  He admits to taking his medication as directed.  He has a liberalized diet.  He does not want to make any changes to his diet.    Jagdish Kulkarni  has a past medical history of GERD (gastroesophageal reflux disease), Hyperlipidemia, and Hypertension.    Allergies   Allergen Reactions   • Oxycontin [Oxycodone] Nausea Only       Current Outpatient Medications:   •  cyclobenzaprine (FLEXERIL) 10 MG tablet, Take 10 mg by mouth As Needed for Muscle Spasms., Disp: , Rfl:   •  DULoxetine (CYMBALTA) 30 MG capsule, Take 30 mg by mouth Daily., Disp: , Rfl:   •  gabapentin (NEURONTIN) 600 MG tablet, Take 600 mg by mouth 3 (Three) Times a Day As Needed., Disp: , Rfl:   •  HYDROcodone-acetaminophen (NORCO)  MG per tablet, Take  tablets by mouth 3 (Three) Times a Day As Needed., Disp: , Rfl:   •  lisinopril (PRINIVIL,ZESTRIL) 5 MG tablet, Take 1 tablet by mouth Daily., Disp: 90 tablet, Rfl: 1  •  simvastatin (ZOCOR) 20 MG tablet, TAKE ONE TABLET BY MOUTH EVERY DAY, Disp: 30 tablet, Rfl: 5  Past Medical History:   Diagnosis Date   • GERD (gastroesophageal reflux disease)    • Hyperlipidemia    • Hypertension      Past Surgical History:   Procedure Laterality Date   • CHOLECYSTECTOMY     • LUMBAR SPINE SURGERY       Social History     Socioeconomic History   • Marital status:      Spouse name: Not on file   • Number of  "children: Not on file   • Years of education: Not on file   • Highest education level: Not on file   Tobacco Use   • Smoking status: Never Smoker   • Smokeless tobacco: Never Used   Substance and Sexual Activity   • Alcohol use: No     Frequency: Never   • Drug use: No   • Sexual activity: Defer     Family History   Problem Relation Age of Onset   • Hypertension Mother    • Hypertension Father        Family history, surgical history, past medical history, Allergies and med's reviewed with patient today and updated in [facility] EMR.     ROS:  Review of Systems   Constitutional: Negative.    HENT: Negative.    Eyes: Negative.    Respiratory: Negative.    Cardiovascular: Negative.    Gastrointestinal: Positive for abdominal pain.   Endocrine: Negative.    Genitourinary: Negative.    Musculoskeletal: Positive for arthralgias, back pain and neck pain.   Skin: Negative.    Allergic/Immunologic: Negative.    Neurological: Negative.    Hematological: Negative.    Psychiatric/Behavioral: Negative.        OBJECTIVE:  Vitals:    04/12/19 1318   BP: 121/77   BP Location: Left arm   Patient Position: Sitting   Cuff Size: Large Adult   Pulse: 99   Resp: 19   Temp: 97.6 °F (36.4 °C)   TempSrc: Tympanic   SpO2: 97%   Weight: 108 kg (238 lb 6.4 oz)   Height: 172.7 cm (68\")     Physical Exam   Constitutional: He is oriented to person, place, and time. He appears well-developed and well-nourished.   HENT:   Head: Normocephalic and atraumatic.   Eyes: Conjunctivae and EOM are normal. Pupils are equal, round, and reactive to light.   Neck: Normal range of motion. Neck supple.   Cardiovascular: Normal rate, regular rhythm, normal heart sounds and intact distal pulses.   Pulmonary/Chest: Effort normal and breath sounds normal.   Abdominal: Soft. Bowel sounds are normal.   Musculoskeletal: Normal range of motion. He exhibits tenderness.   Bilateral knee tenderness. Tender to lumbar spine.   Neurological: He is alert and oriented to person, " place, and time.   Skin: Skin is warm and dry. Capillary refill takes less than 2 seconds.   Psychiatric: He has a normal mood and affect. His behavior is normal. Judgment and thought content normal.   Nursing note and vitals reviewed.      ASSESSMENT/ PLAN:    Jagdish was seen today for heartburn, hyperlipidemia and hypertension.    Diagnoses and all orders for this visit:    Essential hypertension    Mixed hyperlipidemia    Gastroesophageal reflux disease, esophagitis presence not specified  -     omeprazole (priLOSEC) 20 MG capsule; Take 1 capsule by mouth Daily.        Orders Placed Today:     New Medications Ordered This Visit   Medications   • omeprazole (priLOSEC) 20 MG capsule     Sig: Take 1 capsule by mouth Daily.     Dispense:  30 capsule     Refill:  2        Management Plan:     An After Visit Summary was printed and given to the patient at discharge.    Follow-up: Return in about 4 weeks (around 5/10/2019) for Annual physical with labs prior..    Estee Hayden, APRN 4/12/2019 1:23 PM  This note was electronically signed.

## 2019-05-08 ENCOUNTER — CLINICAL SUPPORT (OUTPATIENT)
Dept: FAMILY MEDICINE CLINIC | Facility: CLINIC | Age: 44
End: 2019-05-08

## 2019-05-08 ENCOUNTER — RESULTS ENCOUNTER (OUTPATIENT)
Dept: FAMILY MEDICINE CLINIC | Facility: CLINIC | Age: 44
End: 2019-05-08

## 2019-05-08 DIAGNOSIS — E78.2 MIXED HYPERLIPIDEMIA: ICD-10-CM

## 2019-05-08 DIAGNOSIS — I10 ESSENTIAL HYPERTENSION: ICD-10-CM

## 2019-05-08 DIAGNOSIS — Z00.00 ANNUAL PHYSICAL EXAM: ICD-10-CM

## 2019-05-08 DIAGNOSIS — R53.83 FATIGUE, UNSPECIFIED TYPE: ICD-10-CM

## 2019-05-08 DIAGNOSIS — R53.83 FATIGUE, UNSPECIFIED TYPE: Primary | ICD-10-CM

## 2019-05-21 ENCOUNTER — OFFICE VISIT (OUTPATIENT)
Dept: FAMILY MEDICINE CLINIC | Facility: CLINIC | Age: 44
End: 2019-05-21

## 2019-05-21 VITALS
BODY MASS INDEX: 36.31 KG/M2 | HEART RATE: 107 BPM | SYSTOLIC BLOOD PRESSURE: 125 MMHG | TEMPERATURE: 97.9 F | OXYGEN SATURATION: 99 % | DIASTOLIC BLOOD PRESSURE: 88 MMHG | HEIGHT: 68 IN | WEIGHT: 239.6 LBS | RESPIRATION RATE: 17 BRPM

## 2019-05-21 DIAGNOSIS — E03.9 HYPOTHYROIDISM, UNSPECIFIED TYPE: ICD-10-CM

## 2019-05-21 DIAGNOSIS — R79.89 LOW SERUM TESTOSTERONE LEVEL IN MALE: ICD-10-CM

## 2019-05-21 DIAGNOSIS — I10 ESSENTIAL HYPERTENSION: ICD-10-CM

## 2019-05-21 DIAGNOSIS — E66.01 MORBIDLY OBESE (HCC): ICD-10-CM

## 2019-05-21 DIAGNOSIS — Z00.00 ENCOUNTER FOR WELLNESS EXAMINATION IN ADULT: Primary | ICD-10-CM

## 2019-05-21 PROCEDURE — 99396 PREV VISIT EST AGE 40-64: CPT | Performed by: NURSE PRACTITIONER

## 2019-05-21 RX ORDER — TESTOSTERONE CYPIONATE 200 MG/ML
200 INJECTION, SOLUTION INTRAMUSCULAR
Qty: 10 ML | Refills: 1 | Status: SHIPPED | OUTPATIENT
Start: 2019-05-21 | End: 2020-02-17

## 2019-05-21 RX ORDER — LEVOTHYROXINE SODIUM 0.05 MG/1
50 TABLET ORAL DAILY
Qty: 30 TABLET | Refills: 2 | Status: SHIPPED | OUTPATIENT
Start: 2019-05-21 | End: 2020-02-17 | Stop reason: SDDI

## 2019-05-21 NOTE — PROGRESS NOTES
Chief Complaint   Patient presents with   • Annual Exam        Subjective   Jagdish Kulkarni is a 43 y.o.  male who presents today for annual wellness.    PATIENT CONCERNS:  Fatigue is bad.  He makes himself get up in the morning.  He also got [facility] with no relief.  Otherwise, he is without complaint.    Jagdish Kulkarni  has a past medical history of GERD (gastroesophageal reflux disease), Hyperlipidemia, and Hypertension.    Allergies   Allergen Reactions   • Oxycontin [Oxycodone] Nausea Only       Current Outpatient Medications:   •  cyclobenzaprine (FLEXERIL) 10 MG tablet, Take 10 mg by mouth As Needed for Muscle Spasms., Disp: , Rfl:   •  DULoxetine (CYMBALTA) 30 MG capsule, Take 30 mg by mouth Daily., Disp: , Rfl:   •  gabapentin (NEURONTIN) 600 MG tablet, Take 600 mg by mouth 3 (Three) Times a Day As Needed., Disp: , Rfl:   •  HYDROcodone-acetaminophen (NORCO)  MG per tablet, Take  tablets by mouth 3 (Three) Times a Day As Needed., Disp: , Rfl:   •  lisinopril (PRINIVIL,ZESTRIL) 5 MG tablet, Take 1 tablet by mouth Daily., Disp: 90 tablet, Rfl: 1  •  omeprazole (priLOSEC) 20 MG capsule, Take 1 capsule by mouth Daily., Disp: 30 capsule, Rfl: 2  •  simvastatin (ZOCOR) 20 MG tablet, TAKE ONE TABLET BY MOUTH EVERY DAY, Disp: 30 tablet, Rfl: 5  •  levothyroxine (SYNTHROID, LEVOTHROID) 50 MCG tablet, Take 1 tablet by mouth Daily., Disp: 30 tablet, Rfl: 2  •  Testosterone Cypionate (DEPOTESTOTERONE CYPIONATE) 200 MG/ML injection, Inject 1 mL into the appropriate muscle as directed by prescriber Every 14 (Fourteen) Days., Disp: 10 mL, Rfl: 1  Past Medical History:   Diagnosis Date   • GERD (gastroesophageal reflux disease)    • Hyperlipidemia    • Hypertension      Past Surgical History:   Procedure Laterality Date   • CHOLECYSTECTOMY     • LUMBAR SPINE SURGERY       Social History     Socioeconomic History   • Marital status:      Spouse name: Not on  "file   • Number of children: Not on file   • Years of education: Not on file   • Highest education level: Not on file   Tobacco Use   • Smoking status: Never Smoker   • Smokeless tobacco: Never Used   Substance and Sexual Activity   • Alcohol use: No     Frequency: Never   • Drug use: No   • Sexual activity: Defer     Family History   Problem Relation Age of Onset   • Hypertension Mother    • Hypertension Father        Family history, surgical history, past medical history, Allergies and med's reviewed with patient today and updated in [facility] EMR.     ROS:  Review of Systems   Constitutional: Positive for fatigue. Negative for fever and unexpected weight change.   HENT: Negative.  Negative for facial swelling, sore throat and trouble swallowing.    Eyes: Negative.  Negative for photophobia, discharge and visual disturbance.   Respiratory: Negative.  Negative for cough, chest tightness and shortness of breath.    Cardiovascular: Negative.  Negative for chest pain and palpitations.   Gastrointestinal: Negative.  Negative for abdominal pain, diarrhea, nausea and vomiting.   Endocrine: Negative.  Negative for polydipsia, polyphagia and polyuria.   Genitourinary: Negative.  Negative for dysuria, flank pain and frequency.   Musculoskeletal: Positive for arthralgias and back pain. Negative for gait problem and neck pain.   Skin: Negative.  Negative for rash.   Allergic/Immunologic: Negative.    Neurological: Negative.  Negative for dizziness, light-headedness and headaches.   Hematological: Negative.    Psychiatric/Behavioral: Negative.  Negative for self-injury and suicidal ideas.       OBJECTIVE:  Vitals:    05/21/19 1450   BP: 125/88   BP Location: Left arm   Patient Position: Sitting   Cuff Size: Large Adult   Pulse: 107   Resp: 17   Temp: 97.9 °F (36.6 °C)   TempSrc: Temporal   SpO2: 99%   Weight: 109 kg (239 lb 9.6 oz)   Height: 172.7 cm (68\")     Physical Exam   Constitutional: He is oriented to person, place, and " time. He appears well-developed and well-nourished. No distress.   HENT:   Head: Normocephalic and atraumatic.   Right Ear: External ear normal.   Left Ear: External ear normal.   Nose: Nose normal.   Mouth/Throat: Oropharynx is clear and moist.   Eyes: Conjunctivae and EOM are normal. Pupils are equal, round, and reactive to light.   Neck: Normal range of motion. Neck supple.   Cardiovascular: Normal rate, regular rhythm, normal heart sounds and intact distal pulses.   No murmur heard.  Pulmonary/Chest: Effort normal and breath sounds normal. No respiratory distress.   Abdominal: Soft. Bowel sounds are normal. He exhibits no distension. There is no tenderness.   Musculoskeletal: Normal range of motion. He exhibits tenderness. He exhibits no edema.   Lumbar spine tenderness.  Bilateral knee tenderness.   Neurological: He is alert and oriented to person, place, and time.   Skin: Skin is warm and dry. Capillary refill takes less than 2 seconds. He is not diaphoretic. No erythema.   Psychiatric: He has a normal mood and affect. His behavior is normal. Judgment and thought content normal.   Nursing note and vitals reviewed.      ASSESSMENT/ PLAN:    Jagdish was seen today for annual exam.    Diagnoses and all orders for this visit:    Encounter for wellness examination in adult    Hypothyroidism, unspecified type  -     levothyroxine (SYNTHROID, LEVOTHROID) 50 MCG tablet; Take 1 tablet by mouth Daily.    Low serum testosterone level in male  -     Testosterone Cypionate (DEPOTESTOTERONE CYPIONATE) 200 MG/ML injection; Inject 1 mL into the appropriate muscle as directed by prescriber Every 14 (Fourteen) Days.    Morbidly obese (CMS/HCC)    Essential hypertension        Orders Placed Today:     New Medications Ordered This Visit   Medications   • levothyroxine (SYNTHROID, LEVOTHROID) 50 MCG tablet     Sig: Take 1 tablet by mouth Daily.     Dispense:  30 tablet     Refill:  2   • Testosterone Cypionate (DEPOTESTOTERONE  CYPIONATE) 200 MG/ML injection     Sig: Inject 1 mL into the appropriate muscle as directed by prescriber Every 14 (Fourteen) Days.     Dispense:  10 mL     Refill:  1        Management Plan:     An After Visit Summary was printed and given to the patient at discharge.    Follow-up: Return in about 3 months (around 8/21/2019) for Next scheduled follow up.    Estee Hayden, ELA 5/21/2019 4:28 PM  This note was electronically signed.

## 2019-05-22 ENCOUNTER — CLINICAL SUPPORT (OUTPATIENT)
Dept: FAMILY MEDICINE CLINIC | Facility: CLINIC | Age: 44
End: 2019-05-22

## 2019-05-22 DIAGNOSIS — R79.89 LOW SERUM TESTOSTERONE LEVEL IN MALE: Primary | ICD-10-CM

## 2019-05-22 PROCEDURE — 96372 THER/PROPH/DIAG INJ SC/IM: CPT | Performed by: FAMILY MEDICINE

## 2019-05-22 RX ORDER — TESTOSTERONE CYPIONATE 200 MG/ML
200 INJECTION, SOLUTION INTRAMUSCULAR
Status: DISCONTINUED | OUTPATIENT
Start: 2019-05-22 | End: 2020-02-17

## 2019-05-22 RX ADMIN — TESTOSTERONE CYPIONATE 200 MG: 200 INJECTION, SOLUTION INTRAMUSCULAR at 16:38

## 2019-05-22 NOTE — PROGRESS NOTES
Patient presented to office for Testosterone injection. Patient supplied medication which was inspected by MA. Per MD's order medication was administered and documented by MA. Pt tolerated well.

## 2019-06-10 ENCOUNTER — CLINICAL SUPPORT (OUTPATIENT)
Dept: FAMILY MEDICINE CLINIC | Facility: CLINIC | Age: 44
End: 2019-06-10

## 2019-06-10 DIAGNOSIS — R79.89 LOW TESTOSTERONE: ICD-10-CM

## 2019-06-10 PROCEDURE — 96372 THER/PROPH/DIAG INJ SC/IM: CPT | Performed by: FAMILY MEDICINE

## 2019-06-10 RX ADMIN — TESTOSTERONE CYPIONATE 200 MG: 200 INJECTION, SOLUTION INTRAMUSCULAR at 13:52

## 2019-06-21 ENCOUNTER — CLINICAL SUPPORT (OUTPATIENT)
Dept: FAMILY MEDICINE CLINIC | Facility: CLINIC | Age: 44
End: 2019-06-21

## 2019-07-05 ENCOUNTER — CLINICAL SUPPORT (OUTPATIENT)
Dept: FAMILY MEDICINE CLINIC | Facility: CLINIC | Age: 44
End: 2019-07-05

## 2019-07-05 DIAGNOSIS — K21.9 GASTROESOPHAGEAL REFLUX DISEASE, ESOPHAGITIS PRESENCE NOT SPECIFIED: ICD-10-CM

## 2019-07-05 RX ORDER — OMEPRAZOLE 20 MG/1
20 CAPSULE, DELAYED RELEASE ORAL DAILY
Qty: 90 CAPSULE | Refills: 1 | Status: SHIPPED | OUTPATIENT
Start: 2019-07-05 | End: 2020-01-15 | Stop reason: SDUPTHER

## 2019-07-22 ENCOUNTER — CLINICAL SUPPORT (OUTPATIENT)
Dept: FAMILY MEDICINE CLINIC | Facility: CLINIC | Age: 44
End: 2019-07-22

## 2019-07-22 DIAGNOSIS — R79.89 LOW TESTOSTERONE IN MALE: ICD-10-CM

## 2019-07-22 PROCEDURE — 96372 THER/PROPH/DIAG INJ SC/IM: CPT | Performed by: FAMILY MEDICINE

## 2019-07-22 RX ADMIN — TESTOSTERONE CYPIONATE 200 MG: 200 INJECTION, SOLUTION INTRAMUSCULAR at 15:38

## 2019-08-07 ENCOUNTER — CLINICAL SUPPORT (OUTPATIENT)
Dept: FAMILY MEDICINE CLINIC | Facility: CLINIC | Age: 44
End: 2019-08-07

## 2019-08-07 DIAGNOSIS — E78.2 MIXED HYPERLIPIDEMIA: Primary | ICD-10-CM

## 2019-08-07 DIAGNOSIS — R79.89 LOW TESTOSTERONE IN MALE: ICD-10-CM

## 2019-08-07 PROCEDURE — 96372 THER/PROPH/DIAG INJ SC/IM: CPT | Performed by: FAMILY MEDICINE

## 2019-08-07 RX ORDER — SIMVASTATIN 20 MG
20 TABLET ORAL DAILY
Qty: 30 TABLET | Refills: 5 | Status: SHIPPED | OUTPATIENT
Start: 2019-08-07 | End: 2020-02-10

## 2019-08-07 RX ADMIN — TESTOSTERONE CYPIONATE 200 MG: 200 INJECTION, SOLUTION INTRAMUSCULAR at 10:57

## 2019-08-07 NOTE — PROGRESS NOTES
Patient presented to office for Testosterone injection. Patient supplied medication. Medication was administered to Left Glute. Patient tolerated well.

## 2019-08-19 ENCOUNTER — OFFICE VISIT (OUTPATIENT)
Dept: FAMILY MEDICINE CLINIC | Facility: CLINIC | Age: 44
End: 2019-08-19

## 2019-08-19 VITALS
OXYGEN SATURATION: 98 % | HEART RATE: 88 BPM | BODY MASS INDEX: 35.95 KG/M2 | RESPIRATION RATE: 18 BRPM | SYSTOLIC BLOOD PRESSURE: 111 MMHG | DIASTOLIC BLOOD PRESSURE: 77 MMHG | WEIGHT: 237.2 LBS | HEIGHT: 68 IN

## 2019-08-19 DIAGNOSIS — M54.41 CHRONIC BILATERAL LOW BACK PAIN WITH BILATERAL SCIATICA: ICD-10-CM

## 2019-08-19 DIAGNOSIS — I10 ESSENTIAL HYPERTENSION: Primary | ICD-10-CM

## 2019-08-19 DIAGNOSIS — M54.42 CHRONIC BILATERAL LOW BACK PAIN WITH BILATERAL SCIATICA: ICD-10-CM

## 2019-08-19 DIAGNOSIS — R79.89 LOW TESTOSTERONE IN MALE: ICD-10-CM

## 2019-08-19 DIAGNOSIS — G89.29 CHRONIC BILATERAL LOW BACK PAIN WITH BILATERAL SCIATICA: ICD-10-CM

## 2019-08-19 PROCEDURE — 99213 OFFICE O/P EST LOW 20 MIN: CPT | Performed by: NURSE PRACTITIONER

## 2019-08-19 PROCEDURE — 96372 THER/PROPH/DIAG INJ SC/IM: CPT | Performed by: NURSE PRACTITIONER

## 2019-08-19 RX ADMIN — TESTOSTERONE CYPIONATE 200 MG: 200 INJECTION, SOLUTION INTRAMUSCULAR at 14:02

## 2019-08-19 NOTE — PROGRESS NOTES
Chief Complaint   Patient presents with   • Hypertension     3 MTH FU        Subjective   Jagdish Kulkarni is a 44 y.o.  male who presents today for 3 month follow up HTN.    HPI:  Patient is compliant with the b/p medication.  He has no complications related to the lisinopril.  He has no edema.  Weight is stable.  His pulse initially was elevated again today but has decreased since he was roomed.    Jagdish Kulkarni  has a past medical history of GERD (gastroesophageal reflux disease), Hyperlipidemia, and Hypertension.    Allergies   Allergen Reactions   • Oxycontin [Oxycodone] Nausea Only       Current Outpatient Medications:   •  cyclobenzaprine (FLEXERIL) 10 MG tablet, Take 10 mg by mouth As Needed for Muscle Spasms., Disp: , Rfl:   •  DULoxetine (CYMBALTA) 30 MG capsule, Take 30 mg by mouth Daily., Disp: , Rfl:   •  gabapentin (NEURONTIN) 600 MG tablet, Take 600 mg by mouth 3 (Three) Times a Day As Needed., Disp: , Rfl:   •  HYDROcodone-acetaminophen (NORCO)  MG per tablet, Take  tablets by mouth 3 (Three) Times a Day As Needed., Disp: , Rfl:   •  levothyroxine (SYNTHROID, LEVOTHROID) 50 MCG tablet, Take 1 tablet by mouth Daily., Disp: 30 tablet, Rfl: 2  •  lisinopril (PRINIVIL,ZESTRIL) 5 MG tablet, Take 1 tablet by mouth Daily., Disp: 90 tablet, Rfl: 1  •  omeprazole (priLOSEC) 20 MG capsule, Take 1 capsule by mouth Daily., Disp: 90 capsule, Rfl: 1  •  simvastatin (ZOCOR) 20 MG tablet, Take 1 tablet by mouth Daily., Disp: 30 tablet, Rfl: 5  •  Testosterone Cypionate (DEPOTESTOTERONE CYPIONATE) 200 MG/ML injection, Inject 1 mL into the appropriate muscle as directed by prescriber Every 14 (Fourteen) Days., Disp: 10 mL, Rfl: 1    Current Facility-Administered Medications:   •  Testosterone Cypionate (DEPOTESTOTERONE CYPIONATE) injection 200 mg, 200 mg, Intramuscular, Q14 Days, Avtar Guerrero MD, 200 mg at 08/19/19 1402  Past Medical History:   Diagnosis Date   • GERD (gastroesophageal reflux  disease)    • Hyperlipidemia    • Hypertension      Past Surgical History:   Procedure Laterality Date   • CHOLECYSTECTOMY     • LUMBAR SPINE SURGERY       Social History     Socioeconomic History   • Marital status:      Spouse name: Not on file   • Number of children: Not on file   • Years of education: Not on file   • Highest education level: Not on file   Tobacco Use   • Smoking status: Never Smoker   • Smokeless tobacco: Never Used   Substance and Sexual Activity   • Alcohol use: No     Frequency: Never   • Drug use: No   • Sexual activity: Defer     Family History   Problem Relation Age of Onset   • Hypertension Mother    • Hypertension Father        Family history, surgical history, past medical history, Allergies and med's reviewed with patient today and updated in [facility] EMR.     ROS:  Review of Systems   Constitutional: Negative.  Negative for fatigue, fever and unexpected weight change.   HENT: Negative.  Negative for facial swelling, sore throat and trouble swallowing.    Eyes: Negative.  Negative for photophobia, discharge and visual disturbance.   Respiratory: Negative.  Negative for cough, chest tightness and shortness of breath.    Cardiovascular: Negative.  Negative for chest pain and palpitations.   Gastrointestinal: Negative.  Negative for abdominal pain, diarrhea, nausea and vomiting.   Endocrine: Negative.  Negative for polydipsia, polyphagia and polyuria.   Genitourinary: Negative.  Negative for dysuria, flank pain and frequency.   Musculoskeletal: Positive for arthralgias and back pain. Negative for gait problem and neck pain.   Skin: Negative.  Negative for rash.   Allergic/Immunologic: Negative.    Neurological: Negative.  Negative for dizziness, light-headedness and headaches.   Hematological: Negative.    Psychiatric/Behavioral: Negative.  Negative for self-injury and suicidal ideas.       OBJECTIVE:  Vitals:    08/19/19 1337 08/19/19 1352   BP: 111/77    BP Location: Left arm   "  Patient Position: Sitting    Cuff Size: Large Adult    Pulse: 106 88   Resp: 18    SpO2: 98%    Weight: 108 kg (237 lb 3.2 oz)    Height: 172.7 cm (68\")      Physical Exam   Constitutional: He is oriented to person, place, and time. He appears well-developed and well-nourished. No distress.   HENT:   Head: Normocephalic and atraumatic.   Eyes: Conjunctivae and EOM are normal. Pupils are equal, round, and reactive to light.   Neck: Normal range of motion. Neck supple.   Cardiovascular: Normal rate, regular rhythm, normal heart sounds and intact distal pulses.   No murmur heard.  Pulmonary/Chest: Effort normal and breath sounds normal. No respiratory distress.   Abdominal: Soft. Bowel sounds are normal. He exhibits no distension. There is no tenderness.   Musculoskeletal: Normal range of motion. He exhibits no edema.   Neurological: He is alert and oriented to person, place, and time.   Skin: Skin is warm and dry. Capillary refill takes less than 2 seconds. He is not diaphoretic. No erythema.   Psychiatric: He has a normal mood and affect. His behavior is normal. Judgment and thought content normal.   Nursing note and vitals reviewed.      ASSESSMENT/ PLAN:    Jagdish was seen today for hypertension.    Diagnoses and all orders for this visit:    Essential hypertension    Chronic bilateral low back pain with bilateral sciatica    Low testosterone in male        Orders Placed Today:     No orders of the defined types were placed in this encounter.       Management Plan:     An After Visit Summary was printed and given to the patient at discharge.    Follow-up: Return in about 1 month (around 9/19/2019) for nurse visit for testosterone injection.    Estee Hayden, ELA 8/19/2019 3:07 PM  This note was electronically signed.          "

## 2019-09-16 ENCOUNTER — OFFICE VISIT (OUTPATIENT)
Dept: FAMILY MEDICINE CLINIC | Facility: CLINIC | Age: 44
End: 2019-09-16

## 2019-09-16 VITALS
DIASTOLIC BLOOD PRESSURE: 87 MMHG | BODY MASS INDEX: 37.77 KG/M2 | OXYGEN SATURATION: 96 % | RESPIRATION RATE: 18 BRPM | HEIGHT: 68 IN | HEART RATE: 109 BPM | WEIGHT: 249.2 LBS | SYSTOLIC BLOOD PRESSURE: 118 MMHG

## 2019-09-16 DIAGNOSIS — E03.9 HYPOTHYROIDISM, UNSPECIFIED TYPE: ICD-10-CM

## 2019-09-16 DIAGNOSIS — R42 DIZZINESS: ICD-10-CM

## 2019-09-16 DIAGNOSIS — L73.9 FOLLICULITIS: ICD-10-CM

## 2019-09-16 DIAGNOSIS — M51.36 DDD (DEGENERATIVE DISC DISEASE), LUMBAR: ICD-10-CM

## 2019-09-16 DIAGNOSIS — R79.89 LOW TESTOSTERONE IN MALE: Primary | ICD-10-CM

## 2019-09-16 DIAGNOSIS — R73.9 HYPERGLYCEMIA: ICD-10-CM

## 2019-09-16 PROCEDURE — 99214 OFFICE O/P EST MOD 30 MIN: CPT | Performed by: NURSE PRACTITIONER

## 2019-09-16 PROCEDURE — 96372 THER/PROPH/DIAG INJ SC/IM: CPT | Performed by: NURSE PRACTITIONER

## 2019-09-16 RX ORDER — CEPHALEXIN 500 MG/1
500 CAPSULE ORAL 3 TIMES DAILY
Qty: 21 CAPSULE | Refills: 0 | Status: SHIPPED | OUTPATIENT
Start: 2019-09-16 | End: 2019-09-23

## 2019-09-16 RX ORDER — METHYLPREDNISOLONE ACETATE 40 MG/ML
40 INJECTION, SUSPENSION INTRA-ARTICULAR; INTRALESIONAL; INTRAMUSCULAR; SOFT TISSUE ONCE
Status: COMPLETED | OUTPATIENT
Start: 2019-09-16 | End: 2019-09-16

## 2019-09-16 RX ADMIN — METHYLPREDNISOLONE ACETATE 40 MG: 40 INJECTION, SUSPENSION INTRA-ARTICULAR; INTRALESIONAL; INTRAMUSCULAR; SOFT TISSUE at 17:02

## 2019-09-16 NOTE — PROGRESS NOTES
"Chief Complaint   Patient presents with   • Dizziness   • Rash        Subjective   Jagdish Kulkarni is a 44 y.o.  male who presents today for dizziness and rash.    HPI:  DIZZINESS:  This is becoming a chronic problem.  He is out of his levothyroxine.  He did not get labs rechecked when he ran out.  He said he had been out for about 1 month.    FATIGUE:  He states he continues to be very tired despite adding the testosterone.  He says he feels no different since being on it.  RASH:  He is getting \"pimple areas\" all over his body.  They are tender at times.  Other times don't bother at all.  They have purulent centers.  LOW BACK PAIN:  He continues to go to Pain Management for his pain med; however, he does not get any steroid at all.  He states that the steroids seem to help more than anything else.    Jagdish Kulkarni  has a past medical history of GERD (gastroesophageal reflux disease), Hyperlipidemia, and Hypertension.    Allergies   Allergen Reactions   • Oxycontin [Oxycodone] Nausea Only       Current Outpatient Medications:   •  DULoxetine (CYMBALTA) 30 MG capsule, Take 30 mg by mouth Daily., Disp: , Rfl:   •  gabapentin (NEURONTIN) 600 MG tablet, Take 600 mg by mouth 3 (Three) Times a Day As Needed., Disp: , Rfl:   •  HYDROcodone-acetaminophen (NORCO)  MG per tablet, Take  tablets by mouth 3 (Three) Times a Day As Needed., Disp: , Rfl:   •  levothyroxine (SYNTHROID, LEVOTHROID) 50 MCG tablet, Take 1 tablet by mouth Daily., Disp: 30 tablet, Rfl: 2  •  lisinopril (PRINIVIL,ZESTRIL) 5 MG tablet, Take 1 tablet by mouth Daily., Disp: 90 tablet, Rfl: 1  •  omeprazole (priLOSEC) 20 MG capsule, Take 1 capsule by mouth Daily., Disp: 90 capsule, Rfl: 1  •  simvastatin (ZOCOR) 20 MG tablet, Take 1 tablet by mouth Daily., Disp: 30 tablet, Rfl: 5  •  Testosterone Cypionate (DEPOTESTOTERONE CYPIONATE) 200 MG/ML injection, Inject 1 mL into the appropriate muscle as directed by prescriber Every 14 (Fourteen) " Days., Disp: 10 mL, Rfl: 1  •  cephalexin (KEFLEX) 500 MG capsule, Take 1 capsule by mouth 3 (Three) Times a Day for 7 days., Disp: 21 capsule, Rfl: 0    Current Facility-Administered Medications:   •  Testosterone Cypionate (DEPOTESTOTERONE CYPIONATE) injection 200 mg, 200 mg, Intramuscular, Q14 Days, Avtar Guerrero MD, 200 mg at 08/19/19 1402  Past Medical History:   Diagnosis Date   • GERD (gastroesophageal reflux disease)    • Hyperlipidemia    • Hypertension      Past Surgical History:   Procedure Laterality Date   • CHOLECYSTECTOMY     • LUMBAR SPINE SURGERY       Social History     Socioeconomic History   • Marital status:      Spouse name: Not on file   • Number of children: Not on file   • Years of education: Not on file   • Highest education level: Not on file   Tobacco Use   • Smoking status: Never Smoker   • Smokeless tobacco: Never Used   Substance and Sexual Activity   • Alcohol use: No     Frequency: Never   • Drug use: No   • Sexual activity: Defer     Family History   Problem Relation Age of Onset   • Hypertension Mother    • Hypertension Father        Family history, surgical history, past medical history, Allergies and med's reviewed with patient today and updated in [facility] EMR.     ROS:  Review of Systems   Constitutional: Positive for fatigue. Negative for fever and unexpected weight change.   HENT: Negative.  Negative for facial swelling, sore throat and trouble swallowing.    Eyes: Negative.  Negative for photophobia, discharge and visual disturbance.   Respiratory: Negative.  Negative for cough, chest tightness and shortness of breath.    Cardiovascular: Negative.  Negative for chest pain and palpitations.   Gastrointestinal: Negative.  Negative for abdominal pain, diarrhea, nausea and vomiting.   Endocrine: Negative.  Negative for polydipsia, polyphagia and polyuria.   Genitourinary: Negative.  Negative for dysuria, flank pain and frequency.   Musculoskeletal: Positive for  "arthralgias, back pain and gait problem. Negative for neck pain.   Skin: Positive for rash.   Allergic/Immunologic: Negative.    Neurological: Positive for dizziness. Negative for light-headedness and headaches.   Hematological: Negative.    Psychiatric/Behavioral: Negative.  Negative for self-injury and suicidal ideas.       OBJECTIVE:  Vitals:    09/16/19 1607   BP: 118/87   BP Location: Left arm   Patient Position: Sitting   Cuff Size: Large Adult   Pulse: 109   Resp: 18   SpO2: 96%   Weight: 113 kg (249 lb 3.2 oz)   Height: 172.7 cm (68\")     Physical Exam   Constitutional: He is oriented to person, place, and time. He appears well-developed and well-nourished. No distress.   HENT:   Head: Normocephalic and atraumatic.   Eyes: Conjunctivae and EOM are normal. Pupils are equal, round, and reactive to light.   Neck: Normal range of motion. Neck supple.   Cardiovascular: Normal rate, regular rhythm, normal heart sounds and intact distal pulses.   No murmur heard.  Pulmonary/Chest: Effort normal and breath sounds normal. No respiratory distress.   Abdominal: Soft. Bowel sounds are normal. He exhibits no distension. There is no tenderness.   Musculoskeletal: Normal range of motion. He exhibits tenderness. He exhibits no edema.   Low back pain to percussion and deep palpation.   Neurological: He is alert and oriented to person, place, and time.   Skin: Skin is warm and dry. Capillary refill takes less than 2 seconds. Rash noted. He is not diaphoretic. No erythema.   Macules present across chest and upper back as well as upper arms.  Scattered with purulent centers.  Mild surrounding erythema.   Psychiatric: He has a normal mood and affect. His behavior is normal. Judgment and thought content normal.   Nursing note and vitals reviewed.      ASSESSMENT/ PLAN:    Jagdish was seen today for dizziness and rash.    Diagnoses and all orders for this visit:    Low testosterone in male  -     Testosterone; " Future    Hyperglycemia  -     Comprehensive metabolic panel; Future  -     Hemoglobin A1c; Future    Hypothyroidism, unspecified type  -     T4; Future  -     TSH; Future    Dizziness    Folliculitis  -     cephalexin (KEFLEX) 500 MG capsule; Take 1 capsule by mouth 3 (Three) Times a Day for 7 days.    DDD (degenerative disc disease), lumbar  -     methylPREDNISolone acetate (DEPO-medrol) injection 40 mg        Orders Placed Today:     New Medications Ordered This Visit   Medications   • cephalexin (KEFLEX) 500 MG capsule     Sig: Take 1 capsule by mouth 3 (Three) Times a Day for 7 days.     Dispense:  21 capsule     Refill:  0   • methylPREDNISolone acetate (DEPO-medrol) injection 40 mg        Management Plan:     An After Visit Summary was printed and given to the patient at discharge.    Follow-up: Return in about 4 weeks (around 10/14/2019) for Recheck after lab results.    Estee Hayden, ELA 9/16/2019 5:10 PM  This note was electronically signed.

## 2019-09-30 ENCOUNTER — RESULTS ENCOUNTER (OUTPATIENT)
Dept: FAMILY MEDICINE CLINIC | Facility: CLINIC | Age: 44
End: 2019-09-30

## 2019-09-30 DIAGNOSIS — R79.89 LOW TESTOSTERONE IN MALE: ICD-10-CM

## 2019-09-30 DIAGNOSIS — E03.9 HYPOTHYROIDISM, UNSPECIFIED TYPE: ICD-10-CM

## 2019-09-30 DIAGNOSIS — R73.9 HYPERGLYCEMIA: ICD-10-CM

## 2019-10-09 ENCOUNTER — CLINICAL SUPPORT (OUTPATIENT)
Dept: FAMILY MEDICINE CLINIC | Facility: CLINIC | Age: 44
End: 2019-10-09

## 2019-10-09 DIAGNOSIS — R79.89 LOW TESTOSTERONE: ICD-10-CM

## 2019-10-09 PROCEDURE — 96372 THER/PROPH/DIAG INJ SC/IM: CPT | Performed by: FAMILY MEDICINE

## 2019-10-09 RX ADMIN — TESTOSTERONE CYPIONATE 200 MG: 200 INJECTION, SOLUTION INTRAMUSCULAR at 16:39

## 2019-10-29 ENCOUNTER — CLINICAL SUPPORT (OUTPATIENT)
Dept: FAMILY MEDICINE CLINIC | Facility: CLINIC | Age: 44
End: 2019-10-29

## 2019-10-29 ENCOUNTER — NURSE TRIAGE (OUTPATIENT)
Dept: CALL CENTER | Facility: HOSPITAL | Age: 44
End: 2019-10-29

## 2019-10-29 DIAGNOSIS — R79.89 LOW TESTOSTERONE: ICD-10-CM

## 2019-10-29 PROCEDURE — 96372 THER/PROPH/DIAG INJ SC/IM: CPT | Performed by: FAMILY MEDICINE

## 2019-10-29 RX ADMIN — TESTOSTERONE CYPIONATE 200 MG: 200 INJECTION, SOLUTION INTRAMUSCULAR at 16:36

## 2019-10-29 NOTE — TELEPHONE ENCOUNTER
"Caller asking for appointment for routine injection. Advised per guideline to contact MD office when open.     Reason for Disposition  • Requesting regular office appointment    Additional Information  • Negative: [1] Caller is not with the adult (patient) AND [2] reporting urgent symptoms  • Negative: Lab result questions  • Negative: Medication questions  • Negative: Caller cannot be reached by phone  • Negative: Caller has already spoken to PCP or another triager  • Negative: RN needs further essential information from caller in order to complete triage  • Negative: [1] Caller requesting NON-URGENT health information AND [2] PCP's office is the best resource    Answer Assessment - Initial Assessment Questions  1. REASON FOR CALL or QUESTION: \"What is your reason for calling today?\" or \"How can I best help you?\" or \"What question do you have that I can help answer?\"      Asking for appointment for injection.    Protocols used: INFORMATION ONLY CALL-ADULT-      "

## 2019-11-13 ENCOUNTER — CLINICAL SUPPORT (OUTPATIENT)
Dept: FAMILY MEDICINE CLINIC | Facility: CLINIC | Age: 44
End: 2019-11-13

## 2019-11-13 DIAGNOSIS — R79.89 LOW TESTOSTERONE: ICD-10-CM

## 2019-11-13 PROCEDURE — 96372 THER/PROPH/DIAG INJ SC/IM: CPT | Performed by: FAMILY MEDICINE

## 2019-11-13 RX ADMIN — TESTOSTERONE CYPIONATE 200 MG: 200 INJECTION, SOLUTION INTRAMUSCULAR at 14:17

## 2019-11-13 NOTE — PROGRESS NOTES
Injected 1 ml of Testosterone Cypionate into right dorsogluteal.  Patient tolerated and no reaction observed.

## 2020-01-15 DIAGNOSIS — K21.9 GASTROESOPHAGEAL REFLUX DISEASE, ESOPHAGITIS PRESENCE NOT SPECIFIED: ICD-10-CM

## 2020-01-15 RX ORDER — OMEPRAZOLE 20 MG/1
20 CAPSULE, DELAYED RELEASE ORAL DAILY
Qty: 90 CAPSULE | Refills: 1 | Status: SHIPPED | OUTPATIENT
Start: 2020-01-15 | End: 2020-08-04

## 2020-02-06 DIAGNOSIS — E78.2 MIXED HYPERLIPIDEMIA: ICD-10-CM

## 2020-02-07 DIAGNOSIS — E78.2 MIXED HYPERLIPIDEMIA: ICD-10-CM

## 2020-02-10 RX ORDER — SIMVASTATIN 20 MG
20 TABLET ORAL DAILY
Qty: 90 TABLET | Refills: 1 | Status: SHIPPED | OUTPATIENT
Start: 2020-02-10 | End: 2020-02-17 | Stop reason: SDUPTHER

## 2020-02-10 RX ORDER — SIMVASTATIN 20 MG
TABLET ORAL
Qty: 90 TABLET | Refills: 1 | Status: SHIPPED | OUTPATIENT
Start: 2020-02-10 | End: 2020-09-03 | Stop reason: SDUPTHER

## 2020-02-13 ENCOUNTER — CLINICAL SUPPORT (OUTPATIENT)
Dept: FAMILY MEDICINE CLINIC | Facility: CLINIC | Age: 45
End: 2020-02-13

## 2020-02-13 DIAGNOSIS — E78.2 MIXED HYPERLIPIDEMIA: Primary | ICD-10-CM

## 2020-02-13 DIAGNOSIS — E03.9 HYPOTHYROIDISM, UNSPECIFIED TYPE: ICD-10-CM

## 2020-02-13 DIAGNOSIS — R73.9 HYPERGLYCEMIA: ICD-10-CM

## 2020-02-13 DIAGNOSIS — R79.89 LOW TESTOSTERONE: ICD-10-CM

## 2020-02-14 LAB
ALBUMIN SERPL-MCNC: 4.4 G/DL (ref 4–5)
ALBUMIN/GLOB SERPL: 1.5 {RATIO} (ref 1.2–2.2)
ALP SERPL-CCNC: 84 IU/L (ref 39–117)
ALT SERPL-CCNC: 25 IU/L (ref 0–44)
AST SERPL-CCNC: 22 IU/L (ref 0–40)
BASOPHILS # BLD AUTO: 0.1 X10E3/UL (ref 0–0.2)
BASOPHILS NFR BLD AUTO: 1 %
BILIRUB SERPL-MCNC: 0.4 MG/DL (ref 0–1.2)
BUN SERPL-MCNC: 14 MG/DL (ref 6–24)
BUN/CREAT SERPL: 15 (ref 9–20)
CALCIUM SERPL-MCNC: 9.6 MG/DL (ref 8.7–10.2)
CHLORIDE SERPL-SCNC: 103 MMOL/L (ref 96–106)
CHOLEST SERPL-MCNC: 198 MG/DL (ref 100–199)
CO2 SERPL-SCNC: 24 MMOL/L (ref 20–29)
CREAT SERPL-MCNC: 0.96 MG/DL (ref 0.76–1.27)
EOSINOPHIL # BLD AUTO: 0.1 X10E3/UL (ref 0–0.4)
EOSINOPHIL NFR BLD AUTO: 1 %
ERYTHROCYTE [DISTWIDTH] IN BLOOD BY AUTOMATED COUNT: 13.2 % (ref 11.6–15.4)
GLOBULIN SER CALC-MCNC: 2.9 G/DL (ref 1.5–4.5)
GLUCOSE SERPL-MCNC: 108 MG/DL (ref 65–99)
HCT VFR BLD AUTO: 46.6 % (ref 37.5–51)
HDLC SERPL-MCNC: 40 MG/DL
HGB BLD-MCNC: 15.6 G/DL (ref 13–17.7)
IMM GRANULOCYTES # BLD AUTO: 0 X10E3/UL (ref 0–0.1)
IMM GRANULOCYTES NFR BLD AUTO: 0 %
LDLC SERPL CALC-MCNC: 110 MG/DL (ref 0–99)
LDLC/HDLC SERPL: 2.8 RATIO (ref 0–3.6)
LYMPHOCYTES # BLD AUTO: 2.9 X10E3/UL (ref 0.7–3.1)
LYMPHOCYTES NFR BLD AUTO: 40 %
MCH RBC QN AUTO: 29.1 PG (ref 26.6–33)
MCHC RBC AUTO-ENTMCNC: 33.5 G/DL (ref 31.5–35.7)
MCV RBC AUTO: 87 FL (ref 79–97)
MONOCYTES # BLD AUTO: 0.6 X10E3/UL (ref 0.1–0.9)
MONOCYTES NFR BLD AUTO: 8 %
NEUTROPHILS # BLD AUTO: 3.7 X10E3/UL (ref 1.4–7)
NEUTROPHILS NFR BLD AUTO: 50 %
PLATELET # BLD AUTO: 287 X10E3/UL (ref 150–450)
POTASSIUM SERPL-SCNC: 4.7 MMOL/L (ref 3.5–5.2)
PROT SERPL-MCNC: 7.3 G/DL (ref 6–8.5)
RBC # BLD AUTO: 5.36 X10E6/UL (ref 4.14–5.8)
SODIUM SERPL-SCNC: 141 MMOL/L (ref 134–144)
T4 SERPL-MCNC: 6.3 UG/DL (ref 4.5–12)
TESTOST SERPL-MCNC: 327 NG/DL (ref 264–916)
TRIGL SERPL-MCNC: 238 MG/DL (ref 0–149)
TSH SERPL DL<=0.005 MIU/L-ACNC: 2.29 UIU/ML (ref 0.45–4.5)
VLDLC SERPL CALC-MCNC: 48 MG/DL (ref 5–40)
WBC # BLD AUTO: 7.3 X10E3/UL (ref 3.4–10.8)

## 2020-02-17 ENCOUNTER — OFFICE VISIT (OUTPATIENT)
Dept: FAMILY MEDICINE CLINIC | Facility: CLINIC | Age: 45
End: 2020-02-17

## 2020-02-17 VITALS
HEART RATE: 59 BPM | SYSTOLIC BLOOD PRESSURE: 123 MMHG | DIASTOLIC BLOOD PRESSURE: 81 MMHG | HEIGHT: 68 IN | WEIGHT: 244 LBS | OXYGEN SATURATION: 98 % | BODY MASS INDEX: 36.98 KG/M2

## 2020-02-17 DIAGNOSIS — E78.2 MIXED HYPERLIPIDEMIA: ICD-10-CM

## 2020-02-17 DIAGNOSIS — R73.9 HYPERGLYCEMIA: Primary | ICD-10-CM

## 2020-02-17 DIAGNOSIS — M51.36 DDD (DEGENERATIVE DISC DISEASE), LUMBAR: ICD-10-CM

## 2020-02-17 DIAGNOSIS — I10 ESSENTIAL HYPERTENSION: ICD-10-CM

## 2020-02-17 DIAGNOSIS — E66.01 CLASS 2 SEVERE OBESITY WITH SERIOUS COMORBIDITY AND BODY MASS INDEX (BMI) OF 37.0 TO 37.9 IN ADULT, UNSPECIFIED OBESITY TYPE (HCC): ICD-10-CM

## 2020-02-17 PROBLEM — E66.9 OBESITY: Status: ACTIVE | Noted: 2020-02-17

## 2020-02-17 PROBLEM — M51.369 DDD (DEGENERATIVE DISC DISEASE), LUMBAR: Status: ACTIVE | Noted: 2020-02-17

## 2020-02-17 PROCEDURE — 99214 OFFICE O/P EST MOD 30 MIN: CPT | Performed by: NURSE PRACTITIONER

## 2020-02-17 RX ORDER — LISINOPRIL 5 MG/1
5 TABLET ORAL DAILY
Qty: 90 TABLET | Refills: 1 | Status: SHIPPED | OUTPATIENT
Start: 2020-02-17 | End: 2020-11-05 | Stop reason: SDUPTHER

## 2020-02-17 NOTE — PROGRESS NOTES
Chief Complaint   Patient presents with   • Results     Discuss recent lab work        Subjective   Jagdish Kulkarni is a 44 y.o.  male who presents today for lab results.    HPI:  LAB RESULTS:  Patient is here to discuss his recent lab work.  He had requested it due to an increase in fatigue.  He states he has gone back to work part time.  He thought it would help his fatigue but it has not.  MED REFILLS: He gets Lisinopril, Prilosec and Simvastatin from us.  He needs refills on all.    Jagdish Kulkarni  has a past medical history of GERD (gastroesophageal reflux disease), Hyperlipidemia, and Hypertension.    Allergies   Allergen Reactions   • Oxycontin [Oxycodone] Nausea Only       Current Outpatient Medications:   •  DULoxetine (CYMBALTA) 30 MG capsule, Take 30 mg by mouth Daily., Disp: , Rfl:   •  gabapentin (NEURONTIN) 600 MG tablet, Take 600 mg by mouth 3 (Three) Times a Day As Needed., Disp: , Rfl:   •  HYDROcodone-acetaminophen (NORCO)  MG per tablet, Take  tablets by mouth 3 (Three) Times a Day As Needed., Disp: , Rfl:   •  lisinopril (PRINIVIL,ZESTRIL) 5 MG tablet, Take 1 tablet by mouth Daily., Disp: 90 tablet, Rfl: 1  •  omeprazole (priLOSEC) 20 MG capsule, Take 1 capsule by mouth Daily., Disp: 90 capsule, Rfl: 1  •  simvastatin (ZOCOR) 20 MG tablet, TAKE ONE TABLET BY MOUTH EVERY DAY, Disp: 90 tablet, Rfl: 1    Current Facility-Administered Medications:   •  Testosterone Cypionate (DEPOTESTOTERONE CYPIONATE) injection 200 mg, 200 mg, Intramuscular, Q14 Days, Avtar Guerrero MD, 200 mg at 11/13/19 1417  Past Medical History:   Diagnosis Date   • GERD (gastroesophageal reflux disease)    • Hyperlipidemia    • Hypertension      Past Surgical History:   Procedure Laterality Date   • CHOLECYSTECTOMY     • LUMBAR SPINE SURGERY       Social History     Socioeconomic History   • Marital status:      Spouse name: Not on file   • Number of children: Not on file   • Years of education: Not  "on file   • Highest education level: Not on file   Tobacco Use   • Smoking status: Never Smoker   • Smokeless tobacco: Never Used   Substance and Sexual Activity   • Alcohol use: No     Frequency: Never   • Drug use: No   • Sexual activity: Defer     Family History   Problem Relation Age of Onset   • Hypertension Mother    • Hypertension Father        Family history, surgical history, past medical history, Allergies and med's reviewed with patient today and updated in [facility] EMR.     ROS:  Review of Systems   Constitutional: Positive for fatigue. Negative for fever and unexpected weight change.   HENT: Negative.  Negative for facial swelling, sore throat and trouble swallowing.    Eyes: Negative.  Negative for photophobia, discharge and visual disturbance.   Respiratory: Negative.  Negative for cough, chest tightness and shortness of breath.    Cardiovascular: Negative.  Negative for chest pain and palpitations.   Gastrointestinal: Negative.  Negative for abdominal pain, diarrhea, nausea and vomiting.   Endocrine: Negative.  Negative for polydipsia, polyphagia and polyuria.   Genitourinary: Negative.  Negative for dysuria, flank pain and frequency.   Musculoskeletal: Positive for arthralgias, back pain and gait problem. Negative for neck pain.   Skin: Negative.  Negative for rash.   Allergic/Immunologic: Negative.    Neurological: Positive for weakness and numbness. Negative for dizziness, light-headedness and headaches.   Hematological: Negative.    Psychiatric/Behavioral: Negative.  Negative for self-injury and suicidal ideas.       OBJECTIVE:  Vitals:    02/17/20 1506   BP: 123/81   BP Location: Left arm   Patient Position: Sitting   Cuff Size: Adult   Pulse: 59   SpO2: 98%   Weight: 111 kg (244 lb)   Height: 172.7 cm (68\")     Physical Exam   Constitutional: He is oriented to person, place, and time. He appears well-developed and well-nourished. No distress.   HENT:   Head: Normocephalic and atraumatic.   Eyes: " "Pupils are equal, round, and reactive to light. Conjunctivae and EOM are normal.   Neck: Normal range of motion. Neck supple.   Cardiovascular: Normal rate, regular rhythm, normal heart sounds and intact distal pulses.   No murmur heard.  Pulmonary/Chest: Effort normal and breath sounds normal. No respiratory distress.   Abdominal: Soft. Bowel sounds are normal. He exhibits no distension. There is no tenderness.   Musculoskeletal: Normal range of motion. He exhibits no edema.   Neurological: He is alert and oriented to person, place, and time.   Skin: Skin is warm and dry. Capillary refill takes less than 2 seconds. He is not diaphoretic. No erythema.   Psychiatric: He has a normal mood and affect. His behavior is normal. Judgment and thought content normal.   Nursing note and vitals reviewed.      ASSESSMENT/ PLAN:    Jagdish was seen today for results.    Diagnoses and all orders for this visit:    Hyperglycemia    Essential hypertension  -     lisinopril (PRINIVIL,ZESTRIL) 5 MG tablet; Take 1 tablet by mouth Daily.    Mixed hyperlipidemia    DDD (degenerative disc disease), lumbar    Class 2 severe obesity with serious comorbidity and body mass index (BMI) of 37.0 to 37.9 in adult, unspecified obesity type (CMS/[facility])    Patient counseled regarding elevated glucose.  He states \"starchy\" foods are his downfall, not sweets.  He also drinks sugary soda all day.  We discussed elevated triglycerides and LDL.  Patient declines increasing treatment at present.  He wants to see what he can do on his own over the next 6 months.    Orders Placed Today:     New Medications Ordered This Visit   Medications   • lisinopril (PRINIVIL,ZESTRIL) 5 MG tablet     Sig: Take 1 tablet by mouth Daily.     Dispense:  90 tablet     Refill:  1        Management Plan:     An After Visit Summary was printed and given to the patient at discharge.    Follow-up: Return in about 6 months (around 8/17/2020) for Annual physical with labs " prior.    Estee Hayden, APRN 2/17/2020 3:50 PM  This note was electronically signed.

## 2020-04-06 ENCOUNTER — TELEPHONE (OUTPATIENT)
Dept: FAMILY MEDICINE CLINIC | Facility: CLINIC | Age: 45
End: 2020-04-06

## 2020-04-06 DIAGNOSIS — I10 ESSENTIAL HYPERTENSION: ICD-10-CM

## 2020-04-06 DIAGNOSIS — R42 DIZZINESS: Primary | ICD-10-CM

## 2020-04-06 RX ORDER — MECLIZINE HYDROCHLORIDE 25 MG/1
25 TABLET ORAL 3 TIMES DAILY PRN
Qty: 30 TABLET | Refills: 0 | Status: SHIPPED | OUTPATIENT
Start: 2020-04-06 | End: 2022-06-15

## 2020-04-06 NOTE — TELEPHONE ENCOUNTER
"Patient states that he is \"Dizzy Headed\". Wants to know if he can have something called in or if he needs to come in to be seen.   "

## 2020-04-06 NOTE — TELEPHONE ENCOUNTER
I sent him in a prescription for dizziness but I would like him to check his b/p as well and make sure that is not a new problem for him.

## 2020-05-06 ENCOUNTER — OFFICE VISIT (OUTPATIENT)
Dept: FAMILY MEDICINE CLINIC | Facility: CLINIC | Age: 45
End: 2020-05-06

## 2020-05-06 VITALS
HEART RATE: 85 BPM | OXYGEN SATURATION: 96 % | WEIGHT: 258.8 LBS | SYSTOLIC BLOOD PRESSURE: 128 MMHG | BODY MASS INDEX: 39.22 KG/M2 | TEMPERATURE: 97.2 F | HEIGHT: 68 IN | DIASTOLIC BLOOD PRESSURE: 89 MMHG

## 2020-05-06 DIAGNOSIS — M54.16 LEFT LUMBAR RADICULOPATHY: ICD-10-CM

## 2020-05-06 DIAGNOSIS — J30.1 SEASONAL ALLERGIC RHINITIS DUE TO POLLEN: ICD-10-CM

## 2020-05-06 DIAGNOSIS — R42 DIZZINESS: ICD-10-CM

## 2020-05-06 DIAGNOSIS — M51.36 DDD (DEGENERATIVE DISC DISEASE), LUMBAR: Primary | ICD-10-CM

## 2020-05-06 PROCEDURE — 99214 OFFICE O/P EST MOD 30 MIN: CPT | Performed by: NURSE PRACTITIONER

## 2020-05-06 PROCEDURE — 96372 THER/PROPH/DIAG INJ SC/IM: CPT | Performed by: NURSE PRACTITIONER

## 2020-05-06 RX ORDER — CETIRIZINE HYDROCHLORIDE 10 MG/1
10 TABLET ORAL DAILY
Qty: 30 TABLET | Refills: 2 | Status: SHIPPED | OUTPATIENT
Start: 2020-05-06 | End: 2021-02-03

## 2020-05-06 RX ORDER — TIZANIDINE 4 MG/1
4 TABLET ORAL EVERY 8 HOURS PRN
COMMUNITY
Start: 2020-04-29 | End: 2021-12-27 | Stop reason: SDUPTHER

## 2020-05-06 RX ORDER — METHYLPREDNISOLONE ACETATE 40 MG/ML
40 INJECTION, SUSPENSION INTRA-ARTICULAR; INTRALESIONAL; INTRAMUSCULAR; SOFT TISSUE ONCE
Status: COMPLETED | OUTPATIENT
Start: 2020-05-06 | End: 2020-05-06

## 2020-05-06 RX ORDER — KETOROLAC TROMETHAMINE 30 MG/ML
30 INJECTION, SOLUTION INTRAMUSCULAR; INTRAVENOUS ONCE
Status: COMPLETED | OUTPATIENT
Start: 2020-05-06 | End: 2020-05-06

## 2020-05-06 RX ADMIN — METHYLPREDNISOLONE ACETATE 40 MG: 40 INJECTION, SUSPENSION INTRA-ARTICULAR; INTRALESIONAL; INTRAMUSCULAR; SOFT TISSUE at 10:19

## 2020-05-06 RX ADMIN — METHYLPREDNISOLONE ACETATE 40 MG: 40 INJECTION, SUSPENSION INTRA-ARTICULAR; INTRALESIONAL; INTRAMUSCULAR; SOFT TISSUE at 10:18

## 2020-05-06 RX ADMIN — KETOROLAC TROMETHAMINE 30 MG: 30 INJECTION, SOLUTION INTRAMUSCULAR; INTRAVENOUS at 10:16

## 2020-05-06 NOTE — PROGRESS NOTES
"Chief Complaint   Patient presents with   • Flank Pain     Left side   • Dizziness        Subjective   Jagdish Kulkarni is a 44 y.o.  male who presents today for left flank pain and dizziness.    HPI:  LEFT LOWER BACK PAIN:  Patient has been to [facility] last Wednesday and [facility] on Monday (5/4/20).  He has had the left flank pain that radiates to the left groin.  He has known lumbar DDD with previous surgery.  The pain started about last Wednesday.  He was given a script for Bactrim DS and Toradol.  He is not taking either medication.  \"I never thought it was a kidney stone or UTI.\"  Only routine med in regard to his back condition is gabapentin for the neuopathy.  DIZZINESS:  Started a couple of weeks ago.  Intermittent in occurrence.  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  The dizziness has been positional and typically only lasts for a few/several seconds and goes away.  He has not taken anything OTC for this.  He is not on any antihistamine.    Jagdish Kulkarni  has a past medical history of GERD (gastroesophageal reflux disease), Hyperlipidemia, and Hypertension.    Allergies   Allergen Reactions   • Oxycontin [Oxycodone] Nausea Only       Current Outpatient Medications:   •  gabapentin (NEURONTIN) 600 MG tablet, Take 600 mg by mouth 3 (Three) Times a Day As Needed., Disp: , Rfl:   •  HYDROcodone-acetaminophen (NORCO)  MG per tablet, Take  tablets by mouth 3 (Three) Times a Day As Needed., Disp: , Rfl:   •  meclizine (ANTIVERT) 25 MG tablet, Take 1 tablet by mouth 3 (Three) Times a Day As Needed for Dizziness., Disp: 30 tablet, Rfl: 0  •  omeprazole (priLOSEC) 20 MG capsule, Take 1 capsule by mouth Daily., Disp: 90 capsule, Rfl: 1  •  simvastatin (ZOCOR) 20 MG tablet, TAKE ONE TABLET BY MOUTH EVERY DAY, Disp: 90 tablet, Rfl: 1  •  tiZANidine (ZANAFLEX) 4 MG tablet, Take 4 mg by " "mouth 3 (Three) Times a Day., Disp: , Rfl:   •  cetirizine (zyrTEC) 10 MG tablet, Take 1 tablet by mouth Daily., Disp: 30 tablet, Rfl: 2  •  DULoxetine (CYMBALTA) 30 MG capsule, Take 30 mg by mouth Daily., Disp: , Rfl:   •  lisinopril (PRINIVIL,ZESTRIL) 5 MG tablet, Take 1 tablet by mouth Daily., Disp: 90 tablet, Rfl: 1    Current Facility-Administered Medications:   •  ketorolac (TORADOL) injection 30 mg, 30 mg, Intramuscular, Once, Estee Haydne APRN  •  methylPREDNISolone acetate (DEPO-medrol) injection 40 mg, 40 mg, Intramuscular, Once, Estee Hayden APRN  •  methylPREDNISolone acetate (DEPO-medrol) injection 40 mg, 40 mg, Intramuscular, Once, Estee Hayden APRN  Past Medical History:   Diagnosis Date   • GERD (gastroesophageal reflux disease)    • Hyperlipidemia    • Hypertension      Past Surgical History:   Procedure Laterality Date   • CHOLECYSTECTOMY     • LUMBAR SPINE SURGERY       Social History     Socioeconomic History   • Marital status:      Spouse name: Not on file   • Number of children: Not on file   • Years of education: Not on file   • Highest education level: Not on file   Tobacco Use   • Smoking status: Never Smoker   • Smokeless tobacco: Never Used   Substance and Sexual Activity   • Alcohol use: No     Frequency: Never   • Drug use: No   • Sexual activity: Defer     Family History   Problem Relation Age of Onset   • Hypertension Mother    • Hypertension Father        Family history, surgical history, past medical history, Allergies and med's reviewed with patient today and updated in [facility] EMR.     ROS:  Review of Systems   Constitutional: Positive for fatigue and unexpected weight change. Negative for fever.        Fatigue is worse lately and he has gained 20# in 6 months, unexpected.   HENT: Positive for rhinorrhea and sneezing. Negative for facial swelling, sore throat and trouble swallowing.         \"allergic to pollen.\"   Eyes: Positive for itching. Negative " "for photophobia, discharge and visual disturbance.        Intermittent.   Respiratory: Negative.  Negative for cough, chest tightness and shortness of breath.    Cardiovascular: Negative.  Negative for chest pain and palpitations.   Gastrointestinal: Negative.  Negative for abdominal pain, diarrhea, nausea and vomiting.   Endocrine: Negative.  Negative for polydipsia, polyphagia and polyuria.   Genitourinary: Positive for flank pain. Negative for dysuria and frequency.        Left sided.   Musculoskeletal: Positive for back pain. Negative for gait problem and neck pain.        Chronic.   Skin: Negative.  Negative for rash.   Allergic/Immunologic: Negative.    Neurological: Positive for dizziness, weakness and numbness. Negative for light-headedness and headaches.        Numbness and weakness are chronic.  Dizziness is new and intermittent.   Hematological: Negative.    Psychiatric/Behavioral: Negative.  Negative for self-injury and suicidal ideas.       OBJECTIVE:  Vitals:    05/06/20 0909   BP: 128/89   BP Location: Left arm   Patient Position: Sitting   Cuff Size: Large Adult   Pulse: 85   Temp: 97.2 °F (36.2 °C)   TempSrc: Temporal   SpO2: 96%   Weight: 117 kg (258 lb 12.8 oz)   Height: 172.7 cm (67.99\")     Physical Exam   Constitutional: He is oriented to person, place, and time. He appears well-developed and well-nourished. No distress.   HENT:   Head: Normocephalic and atraumatic.   Right Ear: External ear normal.   Left Ear: External ear normal.   Nose: Nose normal.   Mouth/Throat: Oropharynx is clear and moist.   Mild bilateral serous effusions of the middle ear.  Gray boggy nasal turbinates.   Eyes: Pupils are equal, round, and reactive to light. Conjunctivae and EOM are normal.   Neck: Normal range of motion. Neck supple.   Cardiovascular: Normal rate, regular rhythm, normal heart sounds and intact distal pulses.   No murmur heard.  Pulmonary/Chest: Effort normal and breath sounds normal. No respiratory " distress.   Abdominal: Soft. Bowel sounds are normal. He exhibits no distension. There is no tenderness.   Musculoskeletal: Normal range of motion. He exhibits tenderness. He exhibits no edema.   Lumbar paraspinal muscle group tenderness, L>R with noted muscle spasm on the L) side (in area of pain).  Pain to left groin and lower back with straight leg raise at 30 degrees.  Straight leg raise on the right radiated pain also into the left groin.   Neurological: He is alert and oriented to person, place, and time.   Skin: Skin is warm and dry. Capillary refill takes less than 2 seconds. He is not diaphoretic. No erythema.   Psychiatric: He has a normal mood and affect. His behavior is normal. Judgment and thought content normal.   Nursing note and vitals reviewed.      ASSESSMENT/ PLAN:    Jagdish was seen today for flank pain and dizziness.    Diagnoses and all orders for this visit:    DDD (degenerative disc disease), lumbar  -     methylPREDNISolone acetate (DEPO-medrol) injection 40 mg  -     methylPREDNISolone acetate (DEPO-medrol) injection 40 mg  -     ketorolac (TORADOL) injection 30 mg    Left lumbar radiculopathy  -     methylPREDNISolone acetate (DEPO-medrol) injection 40 mg  -     methylPREDNISolone acetate (DEPO-medrol) injection 40 mg  -     ketorolac (TORADOL) injection 30 mg    Seasonal allergic rhinitis due to pollen  -     cetirizine (zyrTEC) 10 MG tablet; Take 1 tablet by mouth Daily.    Dizziness    Patient encouraged to rest today.  He may return to work tomorrow.  He denies need for work excuse.  Encouraged to use good body mechanics.  Patient also encouraged to stay on daily antihistamine until pollen has lessened.  Patient verbalizes understanding/agreement.    Orders Placed Today:     New Medications Ordered This Visit   Medications   • methylPREDNISolone acetate (DEPO-medrol) injection 40 mg   • methylPREDNISolone acetate (DEPO-medrol) injection 40 mg   • ketorolac (TORADOL) injection 30 mg   •  cetirizine (zyrTEC) 10 MG tablet     Sig: Take 1 tablet by mouth Daily.     Dispense:  30 tablet     Refill:  2        Management Plan:     An After Visit Summary was printed and given to the patient at discharge.    Follow-up: Return if symptoms worsen or fail to improve.    ELA Smiley 5/6/2020 10:00  This note was electronically signed.

## 2020-08-03 DIAGNOSIS — K21.9 GASTROESOPHAGEAL REFLUX DISEASE, ESOPHAGITIS PRESENCE NOT SPECIFIED: ICD-10-CM

## 2020-08-04 RX ORDER — OMEPRAZOLE 20 MG/1
CAPSULE, DELAYED RELEASE ORAL
Qty: 90 CAPSULE | Refills: 1 | Status: SHIPPED | OUTPATIENT
Start: 2020-08-04 | End: 2021-02-03

## 2020-09-03 DIAGNOSIS — E78.2 MIXED HYPERLIPIDEMIA: ICD-10-CM

## 2020-09-03 RX ORDER — SIMVASTATIN 20 MG
20 TABLET ORAL DAILY
Qty: 90 TABLET | Refills: 1 | Status: SHIPPED | OUTPATIENT
Start: 2020-09-03 | End: 2020-11-05 | Stop reason: SDUPTHER

## 2020-09-03 NOTE — TELEPHONE ENCOUNTER
Caller: Jagdish Kulkarni    Relationship: Self    Best call back number: [phone]    Medication needed:   Requested Prescriptions     Pending Prescriptions Disp Refills   • simvastatin (ZOCOR) 20 MG tablet 90 tablet 1     Sig: Take 1 tablet by mouth Daily.       When do you need the refill by: end of day    What details did the patient provide when requesting the medication: completely out    Does the patient have less than a 3 day supply:  [x] Yes  [] No    What is the patient's preferred pharmacy:  [facility] [address] [phone] [facility] [phone]

## 2020-11-05 ENCOUNTER — OFFICE VISIT (OUTPATIENT)
Dept: FAMILY MEDICINE CLINIC | Facility: CLINIC | Age: 45
End: 2020-11-05

## 2020-11-05 VITALS
DIASTOLIC BLOOD PRESSURE: 74 MMHG | WEIGHT: 270 LBS | HEART RATE: 84 BPM | HEIGHT: 68 IN | OXYGEN SATURATION: 97 % | TEMPERATURE: 97.9 F | BODY MASS INDEX: 40.92 KG/M2 | SYSTOLIC BLOOD PRESSURE: 115 MMHG

## 2020-11-05 DIAGNOSIS — M54.16 LEFT LUMBAR RADICULOPATHY: ICD-10-CM

## 2020-11-05 DIAGNOSIS — N52.9 ERECTILE DYSFUNCTION, UNSPECIFIED ERECTILE DYSFUNCTION TYPE: ICD-10-CM

## 2020-11-05 DIAGNOSIS — M51.36 DDD (DEGENERATIVE DISC DISEASE), LUMBAR: ICD-10-CM

## 2020-11-05 DIAGNOSIS — E78.2 MIXED HYPERLIPIDEMIA: ICD-10-CM

## 2020-11-05 DIAGNOSIS — I10 ESSENTIAL HYPERTENSION: Primary | ICD-10-CM

## 2020-11-05 PROCEDURE — 99214 OFFICE O/P EST MOD 30 MIN: CPT | Performed by: NURSE PRACTITIONER

## 2020-11-05 RX ORDER — DULOXETIN HYDROCHLORIDE 30 MG/1
1 CAPSULE, DELAYED RELEASE ORAL DAILY
Status: ON HOLD | COMMUNITY
Start: 2020-10-17 | End: 2021-06-17 | Stop reason: SDUPTHER

## 2020-11-05 RX ORDER — SIMVASTATIN 20 MG
20 TABLET ORAL DAILY
Qty: 90 TABLET | Refills: 1 | Status: SHIPPED | OUTPATIENT
Start: 2020-11-05 | End: 2021-02-25 | Stop reason: SDUPTHER

## 2020-11-05 RX ORDER — LISINOPRIL 5 MG/1
5 TABLET ORAL DAILY
Qty: 90 TABLET | Refills: 1 | Status: SHIPPED | OUTPATIENT
Start: 2020-11-05 | End: 2021-02-25 | Stop reason: SDUPTHER

## 2020-11-05 NOTE — PROGRESS NOTES
Chief Complaint   Patient presents with   • Hypertension     Medication refill.   • Hyperlipidemia        Subjective   Jagdish Kulkarni is a 45 y.o.  male who presents today for med refill.    HPI:  HTN:  Stable on current dosage.  HYPERLIPIDEMIA:  Tolerating medication without problem.  Requesting refill.  No side effects noted.    Jagdish Kulkarni  has a past medical history of GERD (gastroesophageal reflux disease), Hyperlipidemia, and Hypertension.    Allergies   Allergen Reactions   • Oxycontin [Oxycodone] Nausea Only       Current Outpatient Medications:   •  cetirizine (zyrTEC) 10 MG tablet, Take 1 tablet by mouth Daily., Disp: 30 tablet, Rfl: 2  •  DULoxetine (CYMBALTA) 30 MG capsule, 1 capsule Daily., Disp: , Rfl:   •  gabapentin (NEURONTIN) 600 MG tablet, Take 600 mg by mouth 3 (Three) Times a Day As Needed., Disp: , Rfl:   •  HYDROcodone-acetaminophen (NORCO)  MG per tablet, Take  tablets by mouth 3 (Three) Times a Day As Needed., Disp: , Rfl:   •  lisinopril (PRINIVIL,ZESTRIL) 5 MG tablet, Take 1 tablet by mouth Daily., Disp: 90 tablet, Rfl: 1  •  meclizine (ANTIVERT) 25 MG tablet, Take 1 tablet by mouth 3 (Three) Times a Day As Needed for Dizziness., Disp: 30 tablet, Rfl: 0  •  omeprazole (priLOSEC) 20 MG capsule, TAKE ONE CAPSULE BY MOUTH EVERY DAY, Disp: 90 capsule, Rfl: 1  •  simvastatin (ZOCOR) 20 MG tablet, Take 1 tablet by mouth Daily., Disp: 90 tablet, Rfl: 1  •  tiZANidine (ZANAFLEX) 4 MG tablet, Take 4 mg by mouth 3 (Three) Times a Day., Disp: , Rfl:   Past Medical History:   Diagnosis Date   • GERD (gastroesophageal reflux disease)    • Hyperlipidemia    • Hypertension      Past Surgical History:   Procedure Laterality Date   • CHOLECYSTECTOMY     • LUMBAR SPINE SURGERY       Social History     Socioeconomic History   • Marital status:      Spouse name: Not on file   • Number of children: Not on file   • Years of education: Not on file   • Highest education level: Not on file  "  Tobacco Use   • Smoking status: Never Smoker   • Smokeless tobacco: Never Used   Substance and Sexual Activity   • Alcohol use: No     Frequency: Never   • Drug use: No   • Sexual activity: Defer     Family History   Problem Relation Age of Onset   • Hypertension Mother    • Hypertension Father        Family history, surgical history, past medical history, Allergies and med's reviewed with patient today and updated in [facility] EMR.     ROS:  Review of Systems   Constitutional: Negative.  Negative for fatigue, fever and unexpected weight change.   HENT: Negative.  Negative for facial swelling, sore throat and trouble swallowing.    Eyes: Negative.  Negative for photophobia, discharge and visual disturbance.   Respiratory: Negative.  Negative for cough, chest tightness and shortness of breath.    Cardiovascular: Negative.  Negative for chest pain and palpitations.   Gastrointestinal: Negative.  Negative for abdominal pain, diarrhea, nausea and vomiting.   Endocrine: Negative.  Negative for polydipsia, polyphagia and polyuria.   Genitourinary: Negative.  Negative for dysuria, flank pain and frequency.   Musculoskeletal: Positive for back pain. Negative for gait problem and neck pain.   Skin: Negative.  Negative for rash.   Allergic/Immunologic: Negative.    Neurological: Negative.  Negative for dizziness, light-headedness and headaches.   Hematological: Negative.    Psychiatric/Behavioral: Negative.  Negative for self-injury and suicidal ideas.       OBJECTIVE:  Vitals:    11/05/20 1414   BP: 115/74   BP Location: Left arm   Patient Position: Sitting   Cuff Size: Adult   Pulse: 84   Temp: 97.9 °F (36.6 °C)   TempSrc: Infrared   SpO2: 97%   Weight: 122 kg (270 lb)   Height: 172.7 cm (68\")     Physical Exam  Vitals signs and nursing note reviewed.   Constitutional:       General: He is not in acute distress.     Appearance: Normal appearance. He is well-developed. He is obese. He is not diaphoretic.   HENT:      Head: " Normocephalic and atraumatic.   Eyes:      Conjunctiva/sclera: Conjunctivae normal.      Pupils: Pupils are equal, round, and reactive to light.   Neck:      Musculoskeletal: Normal range of motion and neck supple.   Cardiovascular:      Rate and Rhythm: Normal rate and regular rhythm.      Heart sounds: Normal heart sounds. No murmur.   Pulmonary:      Effort: Pulmonary effort is normal. No respiratory distress.      Breath sounds: Normal breath sounds.   Musculoskeletal: Normal range of motion.   Skin:     General: Skin is warm and dry.      Capillary Refill: Capillary refill takes less than 2 seconds.      Findings: No erythema.   Neurological:      Mental Status: He is alert and oriented to person, place, and time. Mental status is at baseline.   Psychiatric:         Mood and Affect: Mood normal.         Behavior: Behavior normal.         Thought Content: Thought content normal.         Judgment: Judgment normal.         ASSESSMENT/ PLAN:    Diagnoses and all orders for this visit:    1. Essential hypertension (Primary)  -     lisinopril (PRINIVIL,ZESTRIL) 5 MG tablet; Take 1 tablet by mouth Daily.  Dispense: 90 tablet; Refill: 1    2. Mixed hyperlipidemia  -     simvastatin (ZOCOR) 20 MG tablet; Take 1 tablet by mouth Daily.  Dispense: 90 tablet; Refill: 1    3. DDD (degenerative disc disease), lumbar    4. Left lumbar radiculopathy    5. Erectile dysfunction, unspecified erectile dysfunction type        Orders Placed Today:     New Medications Ordered This Visit   Medications   • simvastatin (ZOCOR) 20 MG tablet     Sig: Take 1 tablet by mouth Daily.     Dispense:  90 tablet     Refill:  1   • lisinopril (PRINIVIL,ZESTRIL) 5 MG tablet     Sig: Take 1 tablet by mouth Daily.     Dispense:  90 tablet     Refill:  1        Management Plan:     An After Visit Summary was printed and given to the patient at discharge.    Follow-up: Return in about 4 months (around 2/23/2021) for Annual physical with labs  prior.    Estee Hayden, APRN 11/5/2020 21:36 CST  This note was electronically signed.

## 2020-12-08 ENCOUNTER — HOSPITAL ENCOUNTER (OUTPATIENT)
Dept: MRI IMAGING | Age: 45
Discharge: HOME | End: 2020-12-08
Payer: MEDICAID

## 2020-12-08 ENCOUNTER — HOSPITAL ENCOUNTER (OUTPATIENT)
Dept: CT IMAGING | Age: 45
Discharge: HOME | End: 2020-12-08
Payer: MEDICAID

## 2020-12-08 PROCEDURE — 72148 MRI LUMBAR SPINE W/O DYE: CPT

## 2020-12-08 PROCEDURE — 72131 CT LUMBAR SPINE W/O DYE: CPT

## 2021-02-03 DIAGNOSIS — J30.1 SEASONAL ALLERGIC RHINITIS DUE TO POLLEN: ICD-10-CM

## 2021-02-03 DIAGNOSIS — K21.9 GASTROESOPHAGEAL REFLUX DISEASE: ICD-10-CM

## 2021-02-03 RX ORDER — OMEPRAZOLE 20 MG/1
CAPSULE, DELAYED RELEASE ORAL
Qty: 90 CAPSULE | Refills: 1 | Status: SHIPPED | OUTPATIENT
Start: 2021-02-03 | End: 2022-04-28

## 2021-02-03 RX ORDER — CETIRIZINE HYDROCHLORIDE 10 MG/1
TABLET ORAL
Qty: 30 TABLET | Refills: 2 | Status: SHIPPED | OUTPATIENT
Start: 2021-02-03 | End: 2023-02-17 | Stop reason: SDUPTHER

## 2021-02-19 DIAGNOSIS — R79.89 LOW TESTOSTERONE IN MALE: ICD-10-CM

## 2021-02-19 DIAGNOSIS — E78.2 MIXED HYPERLIPIDEMIA: ICD-10-CM

## 2021-02-19 DIAGNOSIS — Z00.00 ENCOUNTER FOR WELLNESS EXAMINATION IN ADULT: ICD-10-CM

## 2021-02-19 DIAGNOSIS — I10 ESSENTIAL HYPERTENSION: Primary | ICD-10-CM

## 2021-02-19 DIAGNOSIS — R73.9 HYPERGLYCEMIA: ICD-10-CM

## 2021-02-23 ENCOUNTER — CLINICAL SUPPORT (OUTPATIENT)
Dept: FAMILY MEDICINE CLINIC | Facility: CLINIC | Age: 46
End: 2021-02-23

## 2021-02-24 LAB
ALBUMIN SERPL-MCNC: 4.3 G/DL (ref 4–5)
ALBUMIN/GLOB SERPL: 1.7 {RATIO} (ref 1.2–2.2)
ALP SERPL-CCNC: 92 IU/L (ref 39–117)
ALT SERPL-CCNC: 40 IU/L (ref 0–44)
AST SERPL-CCNC: 27 IU/L (ref 0–40)
BASOPHILS # BLD AUTO: 0.1 X10E3/UL (ref 0–0.2)
BASOPHILS NFR BLD AUTO: 1 %
BILIRUB SERPL-MCNC: 0.3 MG/DL (ref 0–1.2)
BUN SERPL-MCNC: 17 MG/DL (ref 6–24)
BUN/CREAT SERPL: 20 (ref 9–20)
CALCIUM SERPL-MCNC: 9.7 MG/DL (ref 8.7–10.2)
CHLORIDE SERPL-SCNC: 108 MMOL/L (ref 96–106)
CHOLEST SERPL-MCNC: 196 MG/DL (ref 100–199)
CO2 SERPL-SCNC: 23 MMOL/L (ref 20–29)
CREAT SERPL-MCNC: 0.84 MG/DL (ref 0.76–1.27)
EOSINOPHIL # BLD AUTO: 0.1 X10E3/UL (ref 0–0.4)
EOSINOPHIL NFR BLD AUTO: 1 %
ERYTHROCYTE [DISTWIDTH] IN BLOOD BY AUTOMATED COUNT: 13.1 % (ref 11.6–15.4)
GLOBULIN SER CALC-MCNC: 2.6 G/DL (ref 1.5–4.5)
GLUCOSE SERPL-MCNC: 119 MG/DL (ref 65–99)
HBA1C MFR BLD: 5.9 % (ref 4.8–5.6)
HCT VFR BLD AUTO: 44.7 % (ref 37.5–51)
HCV AB S/CO SERPL IA: <0.1 S/CO RATIO (ref 0–0.9)
HDLC SERPL-MCNC: 51 MG/DL
HGB BLD-MCNC: 15.5 G/DL (ref 13–17.7)
IMM GRANULOCYTES # BLD AUTO: 0 X10E3/UL (ref 0–0.1)
IMM GRANULOCYTES NFR BLD AUTO: 0 %
LDLC SERPL CALC-MCNC: 120 MG/DL (ref 0–99)
LYMPHOCYTES # BLD AUTO: 2.6 X10E3/UL (ref 0.7–3.1)
LYMPHOCYTES NFR BLD AUTO: 30 %
MCH RBC QN AUTO: 30.6 PG (ref 26.6–33)
MCHC RBC AUTO-ENTMCNC: 34.7 G/DL (ref 31.5–35.7)
MCV RBC AUTO: 88 FL (ref 79–97)
MONOCYTES # BLD AUTO: 0.6 X10E3/UL (ref 0.1–0.9)
MONOCYTES NFR BLD AUTO: 8 %
NEUTROPHILS # BLD AUTO: 5 X10E3/UL (ref 1.4–7)
NEUTROPHILS NFR BLD AUTO: 60 %
PLATELET # BLD AUTO: 256 X10E3/UL (ref 150–450)
POTASSIUM SERPL-SCNC: 4.3 MMOL/L (ref 3.5–5.2)
PROT SERPL-MCNC: 6.9 G/DL (ref 6–8.5)
RBC # BLD AUTO: 5.07 X10E6/UL (ref 4.14–5.8)
SODIUM SERPL-SCNC: 142 MMOL/L (ref 134–144)
T4 SERPL-MCNC: 8.7 UG/DL (ref 4.5–12)
TESTOST SERPL-MCNC: 306 NG/DL (ref 264–916)
TRIGL SERPL-MCNC: 138 MG/DL (ref 0–149)
TSH SERPL DL<=0.005 MIU/L-ACNC: 1.3 UIU/ML (ref 0.45–4.5)
VLDLC SERPL CALC-MCNC: 25 MG/DL (ref 5–40)
WBC # BLD AUTO: 8.4 X10E3/UL (ref 3.4–10.8)

## 2021-02-25 ENCOUNTER — OFFICE VISIT (OUTPATIENT)
Dept: FAMILY MEDICINE CLINIC | Facility: CLINIC | Age: 46
End: 2021-02-25

## 2021-02-25 VITALS
SYSTOLIC BLOOD PRESSURE: 117 MMHG | WEIGHT: 243.8 LBS | OXYGEN SATURATION: 97 % | DIASTOLIC BLOOD PRESSURE: 87 MMHG | TEMPERATURE: 98 F | HEIGHT: 69 IN | HEART RATE: 101 BPM | BODY MASS INDEX: 36.11 KG/M2

## 2021-02-25 DIAGNOSIS — E66.9 OBESITY (BMI 30-39.9): ICD-10-CM

## 2021-02-25 DIAGNOSIS — R73.03 PRE-DIABETES: ICD-10-CM

## 2021-02-25 DIAGNOSIS — Z00.00 ENCOUNTER FOR WELLNESS EXAMINATION IN ADULT: Primary | ICD-10-CM

## 2021-02-25 DIAGNOSIS — E78.2 MIXED HYPERLIPIDEMIA: ICD-10-CM

## 2021-02-25 DIAGNOSIS — I10 ESSENTIAL HYPERTENSION: ICD-10-CM

## 2021-02-25 PROCEDURE — 99396 PREV VISIT EST AGE 40-64: CPT | Performed by: NURSE PRACTITIONER

## 2021-02-25 RX ORDER — SIMVASTATIN 20 MG
20 TABLET ORAL DAILY
Qty: 90 TABLET | Refills: 1 | Status: SHIPPED | OUTPATIENT
Start: 2021-02-25 | End: 2021-12-27

## 2021-02-25 RX ORDER — LISINOPRIL 5 MG/1
5 TABLET ORAL DAILY
Qty: 90 TABLET | Refills: 1 | Status: SHIPPED | OUTPATIENT
Start: 2021-02-25 | End: 2021-12-27

## 2021-02-25 NOTE — PROGRESS NOTES
"Chief Complaint  Annual Exam    Subjective          Jagdish Kulkarni presents to [facility]  History of Present Illness  Patient states he is going to have to have another back surgery.  He admits to eating an unhealthy diet rich in carbs.  He is working at [employer].  Drinks sugary drinks.     Objective   Vital Signs:   /87 (BP Location: Left arm, Patient Position: Sitting, Cuff Size: Large Adult)   Pulse 101   Temp 98 °F (36.7 °C)   Ht 175.3 cm (69\") Comment: pt reported  Wt 111 kg (243 lb 12.8 oz)   SpO2 97%   BMI 36.00 kg/m²     Physical Exam   Result Review :     CMP    CMP 2/23/21   Glucose 119 (A)   BUN 17   Creatinine 0.84   eGFR Non  Am 106   eGFR African Am 122   Sodium 142   Potassium 4.3   Chloride 108 (A)   Calcium 9.7   Total Protein 6.9   Albumin 4.3   Globulin 2.6   Total Bilirubin 0.3   Alkaline Phosphatase 92   AST (SGOT) 27   ALT (SGPT) 40   (A) Abnormal value            Lipid Panel    Lipid Panel 2/23/21   Total Cholesterol 196   Triglycerides 138   HDL Cholesterol 51   VLDL Cholesterol 25   LDL Cholesterol  120 (A)   (A) Abnormal value            Most Recent A1C    HGBA1C Most Recent 2/23/21   Hemoglobin A1C 5.9 (A)   (A) Abnormal value       Comments are available for some flowsheets but are not being displayed.                     Assessment and Plan    Diagnoses and all orders for this visit:    1. Encounter for wellness examination in adult (Primary)    2. Pre-diabetes  -     metFORMIN (GLUCOPHAGE) 500 MG tablet; Take 1 tablet by mouth Daily With Breakfast.  Dispense: 90 tablet; Refill: 1    3. Mixed hyperlipidemia  -     simvastatin (ZOCOR) 20 MG tablet; Take 1 tablet by mouth Daily.  Dispense: 90 tablet; Refill: 1    4. Essential hypertension  -     lisinopril (PRINIVIL,ZESTRIL) 5 MG tablet; Take 1 tablet by mouth Daily.  Dispense: 90 tablet; Refill: 1    5. Obesity (BMI 30-39.9)    Patient given materials related to diabetes and diabetic " diet with goal of <40 carbs/meal and <2,500 amy/day for BG control and weight loss.  Patient encouraged to participate in low impact daily exercise, as tolerated, with goal of 30 minutes sustained.  Patient encouraged to continue social distancing and use of mask in public.    Follow Up   Return in about 6 months (around 8/25/2021) for Next scheduled follow up with CMP/Hgb A1c prior.  Patient was given instructions and counseling regarding his condition or for health maintenance advice. Please see specific information pulled into the AVS if appropriate.

## 2021-04-23 ENCOUNTER — HOSPITAL ENCOUNTER (OUTPATIENT)
Facility: HOSPITAL | Age: 46
Setting detail: SURGERY ADMIT
End: 2021-04-23
Attending: ORTHOPAEDIC SURGERY | Admitting: ORTHOPAEDIC SURGERY

## 2021-06-01 ENCOUNTER — PRE-ADMISSION TESTING (OUTPATIENT)
Dept: PREADMISSION TESTING | Facility: HOSPITAL | Age: 46
End: 2021-06-01

## 2021-06-01 ENCOUNTER — HOSPITAL ENCOUNTER (OUTPATIENT)
Dept: GENERAL RADIOLOGY | Facility: HOSPITAL | Age: 46
Discharge: HOME | End: 2021-06-01

## 2021-06-01 VITALS
SYSTOLIC BLOOD PRESSURE: 126 MMHG | OXYGEN SATURATION: 97 % | HEIGHT: 69 IN | BODY MASS INDEX: 36.6 KG/M2 | RESPIRATION RATE: 18 BRPM | WEIGHT: 247.14 LBS | DIASTOLIC BLOOD PRESSURE: 84 MMHG | HEART RATE: 61 BPM

## 2021-06-01 LAB
ALBUMIN SERPL-MCNC: 4.2 G/DL (ref 3.5–5.2)
ALBUMIN/GLOB SERPL: 1.5 G/DL
ALP SERPL-CCNC: 77 U/L (ref 39–117)
ALT SERPL W P-5'-P-CCNC: 57 U/L (ref 1–41)
ANION GAP SERPL CALCULATED.3IONS-SCNC: 8 MMOL/L (ref 5–15)
APTT PPP: 28.1 SECONDS (ref 24.1–35)
AST SERPL-CCNC: 38 U/L (ref 1–40)
BASOPHILS # BLD AUTO: 0.07 10*3/MM3 (ref 0–0.2)
BASOPHILS NFR BLD AUTO: 1.1 % (ref 0–1.5)
BILIRUB SERPL-MCNC: 0.4 MG/DL (ref 0–1.2)
BILIRUB UR QL STRIP: NEGATIVE
BUN SERPL-MCNC: 15 MG/DL (ref 6–20)
BUN/CREAT SERPL: 20.8 (ref 7–25)
CALCIUM SPEC-SCNC: 9.9 MG/DL (ref 8.6–10.5)
CHLORIDE SERPL-SCNC: 104 MMOL/L (ref 98–107)
CLARITY UR: CLEAR
CO2 SERPL-SCNC: 28 MMOL/L (ref 22–29)
COLOR UR: YELLOW
CREAT SERPL-MCNC: 0.72 MG/DL (ref 0.76–1.27)
DEPRECATED RDW RBC AUTO: 39.8 FL (ref 37–54)
EOSINOPHIL # BLD AUTO: 0.07 10*3/MM3 (ref 0–0.4)
EOSINOPHIL NFR BLD AUTO: 1.1 % (ref 0.3–6.2)
ERYTHROCYTE [DISTWIDTH] IN BLOOD BY AUTOMATED COUNT: 12.8 % (ref 12.3–15.4)
GFR SERPL CREATININE-BSD FRML MDRD: 118 ML/MIN/1.73
GLOBULIN UR ELPH-MCNC: 2.8 GM/DL
GLUCOSE SERPL-MCNC: 97 MG/DL (ref 65–99)
GLUCOSE UR STRIP-MCNC: NEGATIVE MG/DL
HCT VFR BLD AUTO: 39.5 % (ref 37.5–51)
HGB BLD-MCNC: 13.5 G/DL (ref 13–17.7)
HGB UR QL STRIP.AUTO: NEGATIVE
IMM GRANULOCYTES # BLD AUTO: 0.02 10*3/MM3 (ref 0–0.05)
IMM GRANULOCYTES NFR BLD AUTO: 0.3 % (ref 0–0.5)
INR PPP: 0.99 (ref 0.91–1.09)
KETONES UR QL STRIP: NEGATIVE
LEUKOCYTE ESTERASE UR QL STRIP.AUTO: NEGATIVE
LYMPHOCYTES # BLD AUTO: 2.33 10*3/MM3 (ref 0.7–3.1)
LYMPHOCYTES NFR BLD AUTO: 36.5 % (ref 19.6–45.3)
MCH RBC QN AUTO: 29.5 PG (ref 26.6–33)
MCHC RBC AUTO-ENTMCNC: 34.2 G/DL (ref 31.5–35.7)
MCV RBC AUTO: 86.2 FL (ref 79–97)
MONOCYTES # BLD AUTO: 0.48 10*3/MM3 (ref 0.1–0.9)
MONOCYTES NFR BLD AUTO: 7.5 % (ref 5–12)
NEUTROPHILS NFR BLD AUTO: 3.42 10*3/MM3 (ref 1.7–7)
NEUTROPHILS NFR BLD AUTO: 53.5 % (ref 42.7–76)
NITRITE UR QL STRIP: NEGATIVE
NRBC BLD AUTO-RTO: 0 /100 WBC (ref 0–0.2)
PH UR STRIP.AUTO: 7 [PH] (ref 5–8)
PLATELET # BLD AUTO: 225 10*3/MM3 (ref 140–450)
PMV BLD AUTO: 10 FL (ref 6–12)
POTASSIUM SERPL-SCNC: 4.3 MMOL/L (ref 3.5–5.2)
PROT SERPL-MCNC: 7 G/DL (ref 6–8.5)
PROT UR QL STRIP: NEGATIVE
PROTHROMBIN TIME: 12.3 SECONDS (ref 11.5–13.4)
RBC # BLD AUTO: 4.58 10*6/MM3 (ref 4.14–5.8)
SODIUM SERPL-SCNC: 140 MMOL/L (ref 136–145)
SP GR UR STRIP: 1.02 (ref 1–1.03)
UROBILINOGEN UR QL STRIP: NORMAL
WBC # BLD AUTO: 6.39 10*3/MM3 (ref 3.4–10.8)

## 2021-06-01 PROCEDURE — 71046 X-RAY EXAM CHEST 2 VIEWS: CPT

## 2021-06-01 PROCEDURE — 85730 THROMBOPLASTIN TIME PARTIAL: CPT

## 2021-06-01 PROCEDURE — 80053 COMPREHEN METABOLIC PANEL: CPT

## 2021-06-01 PROCEDURE — 93010 ELECTROCARDIOGRAM REPORT: CPT | Performed by: INTERNAL MEDICINE

## 2021-06-01 PROCEDURE — 93005 ELECTROCARDIOGRAM TRACING: CPT

## 2021-06-01 PROCEDURE — 81003 URINALYSIS AUTO W/O SCOPE: CPT

## 2021-06-01 PROCEDURE — 85610 PROTHROMBIN TIME: CPT

## 2021-06-01 PROCEDURE — 36415 COLL VENOUS BLD VENIPUNCTURE: CPT

## 2021-06-01 PROCEDURE — 85025 COMPLETE CBC W/AUTO DIFF WBC: CPT

## 2021-06-01 NOTE — DISCHARGE INSTRUCTIONS
DAY OF SURGERY INSTRUCTIONS        YOUR SURGEON: Dr. Castro    PROCEDURE: Right Lateral Lumbar Interbody Fusion With Instrumentation L3-4 - Right    DATE OF SURGERY: 06/14/2021    ARRIVAL TIME: AS DIRECTED BY OFFICE    YOU MAY TAKE THE FOLLOWING MEDICATION(S) THE MORNING OF SURGERY WITH A SIP OF WATER: gabapentin (NEURONTIN)      ALL OTHER HOME MEDICATION CHECK WITH YOUR PHYSICIAN      DO NOT TAKE ANY ERECTILE DYSFUNCTION MEDICATIONS (EX: CIALIS, VIAGRA) 24 HOURS PRIOR TO SURGERY    STOP TAKING LISINOPRIL 24 HOURS PRIOR TO SURGERY                   MANAGING PAIN AFTER SURGERY    We know you are probably wondering what your pain will be like after surgery.  Following surgery it is unrealistic to expect you will not have pain.   Pain is how our bodies let us know that something is wrong or cautions us to be careful.  That said, our goal is to make your pain tolerable.    Methods we may use to treat your pain include (oral or IV medications, PCAs, epidurals, nerve blocks, etc.)   While some procedures require IV pain medications for a short time after surgery, transitioning to pain medications by mouth allows for better management of pain.   Your nurse will encourage you to take oral pain medications whenever possible.  IV medications work almost immediately, but only last a short while.  Taking medications by mouth allows for a more constant level of medication in your blood stream for a longer period of time.      Once your pain is out of control it is harder to get back under control.  It is important you are aware when your next dose of pain medication is due.  If you are admitted, your nurse may write the time of your next dose on the white board in your room to help you remember.      We are interested in your pain and encourage you to inform us about aggravating factors during your visit.   Many times a simple repositioning every few hours can make a big difference.    If your physician says it is okay, do  not let your pain prevent you from getting out of bed. Be sure to call your nurse for assistance prior to getting up so you do not fall.      Before surgery, please decide your tolerable pain goal.  These faces help describe the pain ratings we use on a 0-10 scale.   Be prepared to tell us your goal and whether or not you take pain or anxiety medications at home.          BEFORE YOU COME TO THE HOSPITAL  (Pre-op instructions)  • Do not eat, drink, smoke or chew gum after midnight the night before surgery.  This also includes no mints.  • Morning of surgery take only the medicines you have been instructed with a sip of water unless otherwise instructed  by your physician.  • Do not shave, wear makeup or dark nail polish.  • Remove all jewelry including rings.  • Leave anything you consider valuable at home.  • Leave your suitcase in the car until after your surgery.  • Bring the following with you if applicable:  o Picture ID and insurance, Medicare or Medicaid cards  o Co-pay/deductible required by insurance (cash, check, credit card)  o Copy of advance directive, living will or power-of- documents if not brought to PAT  o CPAP or BIPAP mask and tubing  o Relaxation aids ( book, magazine), etc.  o Hearing aids                        ON THE DAY OF SURGERY  · On the day of surgery check in at registration located at the main entrance of the hospital.   ? You will be registered and given a beeper with instructions where to wait in the main lobby.  ? When your beeper lights up and vibrates a member of the Outpatient Surgery staff will meet you at the double doors under the stair steps and escort you to your preoperative room.   · You may have cloth compression devices placed on your legs. These help to prevent blood clots and reduce swelling in your legs.  · An IV may be inserted into one of your veins.  · In the operating room, you may be given one or more of the following:  ? A medicine to help you relax  "(sedative).  ? A medicine to numb the area (local anesthetic).  ? A medicine to make you fall asleep (general anesthetic).  ? A medicine that is injected into an area of your body to numb everything below the injection site (regional anesthetic).  · Your surgical site will be marked or identified.  · You may be given an antibiotic through your IV to help prevent infection.  Contact a health care provider if you:  · Develop a fever of more than 100.4°F (38°C) or other feelings of illness during the 48 hours before your surgery.  · Have symptoms that get worse.  Have questions or concerns about your surgery    General Anesthesia/Surgery, Adult  General anesthesia is the use of medicines to make a person \"go to sleep\" (unconscious) for a medical procedure. General anesthesia must be used for certain procedures, and is often recommended for procedures that:  · Last a long time.  · Require you to be still or in an unusual position.  · Are major and can cause blood loss.  The medicines used for general anesthesia are called general anesthetics. As well as making you unconscious for a certain amount of time, these medicines:  · Prevent pain.  · Control your blood pressure.  · Relax your muscles.  Tell a health care provider about:  · Any allergies you have.  · All medicines you are taking, including vitamins, herbs, eye drops, creams, and over-the-counter medicines.  · Any problems you or family members have had with anesthetic medicines.  · Types of anesthetics you have had in the past.  · Any blood disorders you have.  · Any surgeries you have had.  · Any medical conditions you have.  · Any recent upper respiratory, chest, or ear infections.  · Any history of:  ? Heart or lung conditions, such as heart failure, sleep apnea, asthma, or chronic obstructive pulmonary disease (COPD).  ?  service.  ? Depression or anxiety.  · Any tobacco or drug use, including marijuana or alcohol use.  · Whether you are pregnant or " may be pregnant.  What are the risks?  Generally, this is a safe procedure. However, problems may occur, including:  · Allergic reaction.  · Lung and heart problems.  · Inhaling food or liquid from the stomach into the lungs (aspiration).  · Nerve injury.  · Air in the bloodstream, which can lead to stroke.  · Extreme agitation or confusion (delirium) when you wake up from the anesthetic.  · Waking up during your procedure and being unable to move. This is rare.  These problems are more likely to develop if you are having a major surgery or if you have an advanced or serious medical condition. You can prevent some of these complications by answering all of your health care provider's questions thoroughly and by following all instructions before your procedure.  General anesthesia can cause side effects, including:  · Nausea or vomiting.  · A sore throat from the breathing tube.  · Hoarseness.  · Wheezing or coughing.  · Shaking chills.  · Tiredness.  · Body aches.  · Anxiety.  · Sleepiness or drowsiness.  · Confusion or agitation.  RISKS AND COMPLICATIONS OF SURGERY  Your health care provider will discuss possible risks and complications with you before surgery. Common risks and complications include:    · Problems due to the use of anesthetics.  · Blood loss and replacement (does not apply to minor surgical procedures).  · Temporary increase in pain due to surgery.  · Uncorrected pain or problems that the surgery was meant to correct.  · Infection.  · New damage.    What happens before the procedure?    Medicines  Ask your health care provider about:  · Changing or stopping your regular medicines. This is especially important if you are taking diabetes medicines or blood thinners.  · Taking medicines such as aspirin and ibuprofen. These medicines can thin your blood. Do not take these medicines unless your health care provider tells you to take them.  · Taking over-the-counter medicines, vitamins, herbs, and  supplements. Do not take these during the week before your procedure unless your health care provider approves them.  General instructions  · Starting 3-6 weeks before the procedure, do not use any products that contain nicotine or tobacco, such as cigarettes and e-cigarettes. If you need help quitting, ask your health care provider.  · If you brush your teeth on the morning of the procedure, make sure to spit out all of the toothpaste.  · Tell your health care provider if you become ill or develop a cold, cough, or fever.  · If instructed by your health care provider, bring your sleep apnea device with you on the day of your surgery (if applicable).  · Ask your health care provider if you will be going home the same day, the following day, or after a longer hospital stay.  ? Plan to have someone take you home from the hospital or clinic.  ? Plan to have a responsible adult care for you for at least 24 hours after you leave the hospital or clinic. This is important.  What happens during the procedure?  · You will be given anesthetics through both of the following:  ? A mask placed over your nose and mouth.  ? An IV in one of your veins.  · You may receive a medicine to help you relax (sedative).  · After you are unconscious, a breathing tube may be inserted down your throat to help you breathe. This will be removed before you wake up.  · An anesthesia specialist will stay with you throughout your procedure. He or she will:  ? Keep you comfortable and safe by continuing to give you medicines and adjusting the amount of medicine that you get.  ? Monitor your blood pressure, pulse, and oxygen levels to make sure that the anesthetics do not cause any problems.  The procedure may vary among health care providers and hospitals.  What happens after the procedure?  · Your blood pressure, temperature, heart rate, breathing rate, and blood oxygen level will be monitored until the medicines you were given have worn off.  · You  will wake up in a recovery area. You may wake up slowly.  · If you feel anxious or agitated, you may be given medicine to help you calm down.  · If you will be going home the same day, your health care provider may check to make sure you can walk, drink, and urinate.  · Your health care provider will treat any pain or side effects you have before you go home.  · Do not drive for 24 hours if you were given a sedative.  Summary  · General anesthesia is used to keep you still and prevent pain during a procedure.  · It is important to tell your healthcare provider about your medical history and any surgeries you have had, and previous experience with anesthesia.  · Follow your healthcare provider’s instructions about when to stop eating, drinking, or taking certain medicines before your procedure.  · Plan to have someone take you home from the hospital or clinic.  This information is not intended to replace advice given to you by your health care provider. Make sure you discuss any questions you have with your health care provider.  Document Released: 03/26/2009 Document Revised: 08/03/2018 Document Reviewed: 08/03/2018  [employer] Interactive Patient Education © 2019 [employer]       Fall Prevention in Hospitals, Adult  As a hospital patient, your condition and the treatments you receive can increase your risk for falls. Some additional risk factors for falls in a hospital include:  · Being in an unfamiliar environment.  · Being on bed rest.  · Your surgery.  · Taking certain medicines.  · Your tubing requirements, such as intravenous (IV) therapy or catheters.  It is important that you learn how to decrease fall risks while at the hospital. Below are important tips that can help prevent falls.  SAFETY TIPS FOR PREVENTING FALLS  Talk about your risk of falling.  · Ask your health care provider why you are at risk for falling. Is it your medicine, illness, tubing placement, or something else?  · Make a plan with your  health care provider to keep you safe from falls.  · Ask your health care provider or pharmacist about side effects of your medicines. Some medicines can make you dizzy or affect your coordination.  Ask for help.  · Ask for help before getting out of bed. You may need to press your call button.  · Ask for assistance in getting safely to the toilet.  · Ask for a walker or cane to be put at your bedside. Ask that most of the side rails on your bed be placed up before your health care provider leaves the room.  · Ask family or friends to sit with you.  · Ask for things that are out of your reach, such as your glasses, hearing aids, telephone, bedside table, or call button.  Follow these tips to avoid falling:  · Stay lying or seated, rather than standing, while waiting for help.  · Wear rubber-soled slippers or shoes whenever you walk in the hospital.  · Avoid quick, sudden movements.  ¨ Change positions slowly.  ¨ Sit on the side of your bed before standing.  ¨ Stand up slowly and wait before you start to walk.  · Let your health care provider know if there is a spill on the floor.  · Pay careful attention to the medical equipment, electrical cords, and tubes around you.  · When you need help, use your call button by your bed or in the bathroom. Wait for one of your health care providers to help you.  · If you feel dizzy or unsure of your footing, return to bed and wait for assistance.  · Avoid being distracted by the TV, telephone, or another person in your room.  · Do not lean or support yourself on rolling objects, such as IV poles or bedside tables.     This information is not intended to replace advice given to you by your health care provider. Make sure you discuss any questions you have with your health care provider.     Document Released: 12/15/2001 Document Revised: 01/08/2016 Document Reviewed: 08/25/2013  [employer] Interactive Patient Education ©2016 [employer]       [facility] 4%  Patient Instruction Sheet    Chlorhexidine Before Surgery  Chlorhexidine gluconate (CHG) is a germ-killing (antiseptic) solution that is used to clean the skin. It gets rid of the bacteria that normally live on the skin. Cleaning your skin with CHG before surgery helps lower the risk for infection after surgery.    How to use CHG solution  · You will take 2 showers, one shower the night before surgery, the second shower the morning of surgery before coming to the hospital.  · Use CHG only as told by your health care provider, and follow the instructions on the label.  · Use CHG solution while taking a shower. Follow these steps when using CHG solution (unless your health care provider gives you different instructions):  1. Start the shower.  2. Use your normal soap and shampoo to wash your face and hair.  3. Turn off the shower or move out of the shower stream.  4. Pour the CHG onto a clean washcloth. Do not use any type of brush or rough-edged sponge.  5. Starting at your neck, lather your body down to your toes. Make sure you:  6. Pay special attention to the part of your body where you will be having surgery. Scrub this area for at least 1 minute.  7. Use the full amount of CHG as directed. Usually, this is one half bottle for each shower.  8. Do not use CHG on your head or face. If the solution gets into your ears or eyes, rinse them well with water.  9. Avoid your genital area.  10. Avoid any areas of skin that have broken skin, cuts, or scrapes.  11. Scrub your back and under your arms. Make sure to wash skin folds.  12. Let the lather sit on your skin for 1-2 minutes or as long as told by your health care  provider.  13. Thoroughly rinse your entire body in the shower. Make sure that all body creases and crevices are rinsed well.  14. Dry off with a clean towel. Do not put any substances on your body afterward, such as powder, lotion, or perfume.  15. Put on clean clothes or pajamas.  16. If it is the night  before your surgery, sleep in clean sheets.    What are the risks?  Risks of using CHG include:  · A skin reaction.  · Hearing loss, if CHG gets in your ears.  · Eye injury, if CHG gets in your eyes and is not rinsed out.  · The CHG product catching fire.  Make sure that you avoid smoking and flames after applying CHG to your skin.  Do not use CHG:  · If you have a chlorhexidine allergy or have previously reacted to chlorhexidine.  · On babies younger than 2 months of age.      On the day of surgery, when you are taken to your room in Outpatient Surgery you will be given a CHG prepackaged cloth to wipe the site for your surgery.  How to use CHG prepackaged cloths  · Follow the instructions on the label.  · Use the CHG cloth on clean, dry skin. Follow these steps when using a CHG cloth (unless your health care provider gives you different instructions):  1. Using the CHG cloth, vigorously scrub the part of your body where you will be having surgery. Scrub using a back-and-forth motion for 3 minutes. The area on your body should be completely wet with CHG when you are finished scrubbing.  2. Do not rinse. Discard the cloth and let the area air-dry for 1 minute. Do not put any substances on your body afterward, such as powder, lotion, or perfume.  Contact a health care provider if:  · Your skin gets irritated after scrubbing.  · You have questions about using your solution or cloth.  Get help right away if:  · Your eyes become very red or swollen.  · Your eyes itch badly.  · Your skin itches badly and is red or swollen.  · Your hearing changes.  · You have trouble seeing.  · You have swelling or tingling in your mouth or throat.  · You have trouble breathing.  · You swallow any chlorhexidine.  Summary  · Chlorhexidine gluconate (CHG) is a germ-killing (antiseptic) solution that is used to clean the skin. Cleaning your skin with CHG before surgery helps lower the risk for infection after surgery.  · You may be given CHG  to use at home. It may be in a bottle or in a prepackaged cloth to use on your skin. Carefully follow your health care provider's instructions and the instructions on the product label.  · Do not use CHG if you have a chlorhexidine allergy.  · Contact your health care provider if your skin gets irritated after scrubbing.  This information is not intended to replace advice given to you by your health care provider. Make sure you discuss any questions you have with your health care provider.  Document Released: 09/11/2013 Document Revised: 11/15/2018 Document Reviewed: 11/15/2018  [employer] Interactive Patient Education © 2019 [employer]

## 2021-06-02 LAB
QT INTERVAL: 422 MS
QTC INTERVAL: 410 MS

## 2021-06-04 ENCOUNTER — TRANSCRIBE ORDERS (OUTPATIENT)
Dept: ADMINISTRATIVE | Facility: HOSPITAL | Age: 46
End: 2021-06-04

## 2021-06-04 DIAGNOSIS — Z01.818 PREOPERATIVE TESTING: Primary | ICD-10-CM

## 2021-06-14 ENCOUNTER — ANESTHESIA EVENT (OUTPATIENT)
Dept: PERIOP | Facility: HOSPITAL | Age: 46
End: 2021-06-14

## 2021-06-14 ENCOUNTER — HOSPITAL ENCOUNTER (INPATIENT)
Facility: HOSPITAL | Age: 46
LOS: 3 days | Discharge: HOME | End: 2021-06-17
Attending: ORTHOPAEDIC SURGERY | Admitting: ORTHOPAEDIC SURGERY

## 2021-06-14 ENCOUNTER — ANESTHESIA (OUTPATIENT)
Dept: PERIOP | Facility: HOSPITAL | Age: 46
End: 2021-06-14

## 2021-06-14 ENCOUNTER — APPOINTMENT (OUTPATIENT)
Dept: GENERAL RADIOLOGY | Facility: HOSPITAL | Age: 46
End: 2021-06-14

## 2021-06-14 DIAGNOSIS — Z78.9 DECREASED ACTIVITIES OF DAILY LIVING (ADL): ICD-10-CM

## 2021-06-14 DIAGNOSIS — M51.36 DDD (DEGENERATIVE DISC DISEASE), LUMBAR: ICD-10-CM

## 2021-06-14 DIAGNOSIS — M54.50 CHRONIC BILATERAL LOW BACK PAIN WITHOUT SCIATICA: Primary | ICD-10-CM

## 2021-06-14 DIAGNOSIS — Z74.09 IMPAIRED MOBILITY: ICD-10-CM

## 2021-06-14 DIAGNOSIS — G89.29 CHRONIC BILATERAL LOW BACK PAIN WITHOUT SCIATICA: Primary | ICD-10-CM

## 2021-06-14 LAB
ABO GROUP BLD: NORMAL
BLD GP AB SCN SERPL QL: NEGATIVE
GLUCOSE BLDC GLUCOMTR-MCNC: 108 MG/DL (ref 70–130)
GLUCOSE BLDC GLUCOMTR-MCNC: 127 MG/DL (ref 70–130)
RH BLD: POSITIVE
T&S EXPIRATION DATE: NORMAL

## 2021-06-14 PROCEDURE — 25010000003 HYDROMORPHONE 1 MG/ML SOLUTION: Performed by: ORTHOPAEDIC SURGERY

## 2021-06-14 PROCEDURE — 0ST20ZZ RESECTION OF LUMBAR VERTEBRAL DISC, OPEN APPROACH: ICD-10-PCS | Performed by: ORTHOPAEDIC SURGERY

## 2021-06-14 PROCEDURE — 25010000002 CEFAZOLIN PER 500 MG: Performed by: ORTHOPAEDIC SURGERY

## 2021-06-14 PROCEDURE — 72100 X-RAY EXAM L-S SPINE 2/3 VWS: CPT

## 2021-06-14 PROCEDURE — 25010000002 SUCCINYLCHOLINE PER 20 MG: Performed by: NURSE ANESTHETIST, CERTIFIED REGISTERED

## 2021-06-14 PROCEDURE — 25010000002 PHENYLEPHRINE HCL 0.8 MG/10ML SOLUTION PREFILLED SYRINGE: Performed by: NURSE ANESTHETIST, CERTIFIED REGISTERED

## 2021-06-14 PROCEDURE — 82962 GLUCOSE BLOOD TEST: CPT

## 2021-06-14 PROCEDURE — 0SG00A0 FUSION OF LUMBAR VERTEBRAL JOINT WITH INTERBODY FUSION DEVICE, ANTERIOR APPROACH, ANTERIOR COLUMN, OPEN APPROACH: ICD-10-PCS | Performed by: ORTHOPAEDIC SURGERY

## 2021-06-14 PROCEDURE — C1713 ANCHOR/SCREW BN/BN,TIS/BN: HCPCS | Performed by: ORTHOPAEDIC SURGERY

## 2021-06-14 PROCEDURE — 97165 OT EVAL LOW COMPLEX 30 MIN: CPT | Performed by: OCCUPATIONAL THERAPIST

## 2021-06-14 PROCEDURE — 76000 FLUOROSCOPY <1 HR PHYS/QHP: CPT

## 2021-06-14 PROCEDURE — 25010000002 ONDANSETRON PER 1 MG: Performed by: NURSE ANESTHETIST, CERTIFIED REGISTERED

## 2021-06-14 PROCEDURE — 25010000002 PROPOFOL 10 MG/ML EMULSION: Performed by: NURSE ANESTHETIST, CERTIFIED REGISTERED

## 2021-06-14 PROCEDURE — 25010000002 HYDROMORPHONE PER 4 MG: Performed by: ANESTHESIOLOGY

## 2021-06-14 PROCEDURE — 97161 PT EVAL LOW COMPLEX 20 MIN: CPT | Performed by: PHYSICAL THERAPIST

## 2021-06-14 PROCEDURE — 94799 UNLISTED PULMONARY SVC/PX: CPT

## 2021-06-14 PROCEDURE — 86900 BLOOD TYPING SEROLOGIC ABO: CPT | Performed by: ORTHOPAEDIC SURGERY

## 2021-06-14 PROCEDURE — 25010000002 MIDAZOLAM PER 1 MG: Performed by: ANESTHESIOLOGY

## 2021-06-14 PROCEDURE — 86901 BLOOD TYPING SEROLOGIC RH(D): CPT | Performed by: ORTHOPAEDIC SURGERY

## 2021-06-14 PROCEDURE — C1889 IMPLANT/INSERT DEVICE, NOC: HCPCS | Performed by: ORTHOPAEDIC SURGERY

## 2021-06-14 PROCEDURE — 86850 RBC ANTIBODY SCREEN: CPT | Performed by: ORTHOPAEDIC SURGERY

## 2021-06-14 DEVICE — ALLOGRFT FIBR OSTEOAMP SELECT 5CC: Type: IMPLANTABLE DEVICE | Site: SPINE LUMBAR | Status: FUNCTIONAL

## 2021-06-14 DEVICE — PLT XLIF AMS XLP 2/SD TI 8MM: Type: IMPLANTABLE DEVICE | Site: SPINE LUMBAR | Status: FUNCTIONAL

## 2021-06-14 DEVICE — SCRW XLF COROENT VAR TI 5.5X35MM: Type: IMPLANTABLE DEVICE | Site: SPINE LUMBAR | Status: FUNCTIONAL

## 2021-06-14 DEVICE — SPACR FUSN COHERE XLW INTER/VERT XLW PEEK 10DEG 12X22X55MM: Type: IMPLANTABLE DEVICE | Site: SPINE LUMBAR | Status: FUNCTIONAL

## 2021-06-14 DEVICE — SCRW COROENT XLF VAR 5.5X50MM: Type: IMPLANTABLE DEVICE | Site: SPINE LUMBAR | Status: FUNCTIONAL

## 2021-06-14 DEVICE — KT HEMOST ABS SURGIFOAM PORCN 1GRAM: Type: IMPLANTABLE DEVICE | Site: SPINE LUMBAR | Status: FUNCTIONAL

## 2021-06-14 RX ORDER — SODIUM CHLORIDE 0.9 % (FLUSH) 0.9 %
3-10 SYRINGE (ML) INJECTION AS NEEDED
Status: DISCONTINUED | OUTPATIENT
Start: 2021-06-14 | End: 2021-06-14 | Stop reason: HOSPADM

## 2021-06-14 RX ORDER — LISINOPRIL 5 MG/1
5 TABLET ORAL DAILY
Status: DISCONTINUED | OUTPATIENT
Start: 2021-06-14 | End: 2021-06-17 | Stop reason: HOSPADM

## 2021-06-14 RX ORDER — ONDANSETRON 4 MG/1
4 TABLET, FILM COATED ORAL EVERY 6 HOURS PRN
Status: DISCONTINUED | OUTPATIENT
Start: 2021-06-14 | End: 2021-06-17 | Stop reason: HOSPADM

## 2021-06-14 RX ORDER — SUCCINYLCHOLINE CHLORIDE 20 MG/ML
INJECTION INTRAMUSCULAR; INTRAVENOUS AS NEEDED
Status: DISCONTINUED | OUTPATIENT
Start: 2021-06-14 | End: 2021-06-14 | Stop reason: SURG

## 2021-06-14 RX ORDER — PHENYLEPHRINE HCL IN 0.9% NACL 0.8MG/10ML
SYRINGE (ML) INTRAVENOUS AS NEEDED
Status: DISCONTINUED | OUTPATIENT
Start: 2021-06-14 | End: 2021-06-14 | Stop reason: SURG

## 2021-06-14 RX ORDER — HYDROCODONE BITARTRATE AND ACETAMINOPHEN 10; 325 MG/1; MG/1
1 TABLET ORAL ONCE AS NEEDED
Status: DISCONTINUED | OUTPATIENT
Start: 2021-06-14 | End: 2021-06-14

## 2021-06-14 RX ORDER — NAPROXEN SODIUM 220 MG
220 TABLET ORAL 2 TIMES DAILY PRN
COMMUNITY
End: 2021-06-17 | Stop reason: HOSPADM

## 2021-06-14 RX ORDER — BUPIVACAINE HCL/0.9 % NACL/PF 0.1 %
2 PLASTIC BAG, INJECTION (ML) EPIDURAL ONCE
Status: COMPLETED | OUTPATIENT
Start: 2021-06-14 | End: 2021-06-14

## 2021-06-14 RX ORDER — ATORVASTATIN CALCIUM 10 MG/1
10 TABLET, FILM COATED ORAL NIGHTLY
Status: DISCONTINUED | OUTPATIENT
Start: 2021-06-14 | End: 2021-06-17 | Stop reason: HOSPADM

## 2021-06-14 RX ORDER — ACETAMINOPHEN 325 MG/1
650 TABLET ORAL EVERY 4 HOURS PRN
Status: DISCONTINUED | OUTPATIENT
Start: 2021-06-14 | End: 2021-06-17 | Stop reason: HOSPADM

## 2021-06-14 RX ORDER — NALOXONE HCL 0.4 MG/ML
0.04 VIAL (ML) INJECTION AS NEEDED
Status: DISCONTINUED | OUTPATIENT
Start: 2021-06-14 | End: 2021-06-14

## 2021-06-14 RX ORDER — ONDANSETRON 2 MG/ML
4 INJECTION INTRAMUSCULAR; INTRAVENOUS AS NEEDED
Status: DISCONTINUED | OUTPATIENT
Start: 2021-06-14 | End: 2021-06-14

## 2021-06-14 RX ORDER — SODIUM CHLORIDE 0.9 % (FLUSH) 0.9 %
10 SYRINGE (ML) INJECTION EVERY 12 HOURS SCHEDULED
Status: DISCONTINUED | OUTPATIENT
Start: 2021-06-14 | End: 2021-06-14 | Stop reason: HOSPADM

## 2021-06-14 RX ORDER — ROCURONIUM BROMIDE 10 MG/ML
INJECTION, SOLUTION INTRAVENOUS AS NEEDED
Status: DISCONTINUED | OUTPATIENT
Start: 2021-06-14 | End: 2021-06-14 | Stop reason: SURG

## 2021-06-14 RX ORDER — SODIUM CHLORIDE 0.9 % (FLUSH) 0.9 %
10 SYRINGE (ML) INJECTION AS NEEDED
Status: DISCONTINUED | OUTPATIENT
Start: 2021-06-14 | End: 2021-06-14 | Stop reason: HOSPADM

## 2021-06-14 RX ORDER — TIZANIDINE 4 MG/1
4 TABLET ORAL EVERY 8 HOURS PRN
Status: DISCONTINUED | OUTPATIENT
Start: 2021-06-14 | End: 2021-06-17 | Stop reason: HOSPADM

## 2021-06-14 RX ORDER — HYDROCODONE BITARTRATE AND ACETAMINOPHEN 7.5; 325 MG/1; MG/1
1 TABLET ORAL EVERY 4 HOURS PRN
Status: DISCONTINUED | OUTPATIENT
Start: 2021-06-14 | End: 2021-06-17 | Stop reason: HOSPADM

## 2021-06-14 RX ORDER — FAMOTIDINE 10 MG/ML
20 INJECTION, SOLUTION INTRAVENOUS
Status: COMPLETED | OUTPATIENT
Start: 2021-06-14 | End: 2021-06-14

## 2021-06-14 RX ORDER — NALOXONE HCL 0.4 MG/ML
0.4 VIAL (ML) INJECTION
Status: DISCONTINUED | OUTPATIENT
Start: 2021-06-14 | End: 2021-06-17 | Stop reason: HOSPADM

## 2021-06-14 RX ORDER — HYDROMORPHONE HYDROCHLORIDE 1 MG/ML
0.5 INJECTION, SOLUTION INTRAMUSCULAR; INTRAVENOUS; SUBCUTANEOUS
Status: DISCONTINUED | OUTPATIENT
Start: 2021-06-14 | End: 2021-06-14

## 2021-06-14 RX ORDER — ONDANSETRON 2 MG/ML
INJECTION INTRAMUSCULAR; INTRAVENOUS AS NEEDED
Status: DISCONTINUED | OUTPATIENT
Start: 2021-06-14 | End: 2021-06-14 | Stop reason: SURG

## 2021-06-14 RX ORDER — LIDOCAINE HYDROCHLORIDE 10 MG/ML
0.5 INJECTION, SOLUTION EPIDURAL; INFILTRATION; INTRACAUDAL; PERINEURAL ONCE AS NEEDED
Status: DISCONTINUED | OUTPATIENT
Start: 2021-06-14 | End: 2021-06-14 | Stop reason: HOSPADM

## 2021-06-14 RX ORDER — LABETALOL HYDROCHLORIDE 5 MG/ML
5 INJECTION, SOLUTION INTRAVENOUS
Status: DISCONTINUED | OUTPATIENT
Start: 2021-06-14 | End: 2021-06-14

## 2021-06-14 RX ORDER — SODIUM CHLORIDE, SODIUM LACTATE, POTASSIUM CHLORIDE, CALCIUM CHLORIDE 600; 310; 30; 20 MG/100ML; MG/100ML; MG/100ML; MG/100ML
1000 INJECTION, SOLUTION INTRAVENOUS CONTINUOUS
Status: DISCONTINUED | OUTPATIENT
Start: 2021-06-14 | End: 2021-06-14 | Stop reason: HOSPADM

## 2021-06-14 RX ORDER — SUFENTANIL CITRATE 50 UG/ML
INJECTION EPIDURAL; INTRAVENOUS AS NEEDED
Status: DISCONTINUED | OUTPATIENT
Start: 2021-06-14 | End: 2021-06-14 | Stop reason: SURG

## 2021-06-14 RX ORDER — FLUMAZENIL 0.1 MG/ML
0.2 INJECTION INTRAVENOUS AS NEEDED
Status: DISCONTINUED | OUTPATIENT
Start: 2021-06-14 | End: 2021-06-14

## 2021-06-14 RX ORDER — SODIUM CHLORIDE 9 MG/ML
100 INJECTION, SOLUTION INTRAVENOUS CONTINUOUS
Status: DISCONTINUED | OUTPATIENT
Start: 2021-06-14 | End: 2021-06-17 | Stop reason: HOSPADM

## 2021-06-14 RX ORDER — SODIUM CHLORIDE 0.9 % (FLUSH) 0.9 %
10 SYRINGE (ML) INJECTION AS NEEDED
Status: DISCONTINUED | OUTPATIENT
Start: 2021-06-14 | End: 2021-06-17 | Stop reason: HOSPADM

## 2021-06-14 RX ORDER — POLYETHYLENE GLYCOL 3350 17 G/17G
17 POWDER, FOR SOLUTION ORAL DAILY PRN
Status: DISCONTINUED | OUTPATIENT
Start: 2021-06-14 | End: 2021-06-17 | Stop reason: HOSPADM

## 2021-06-14 RX ORDER — MIDAZOLAM HYDROCHLORIDE 1 MG/ML
1 INJECTION INTRAMUSCULAR; INTRAVENOUS
Status: DISCONTINUED | OUTPATIENT
Start: 2021-06-14 | End: 2021-06-14 | Stop reason: HOSPADM

## 2021-06-14 RX ORDER — SODIUM CHLORIDE 0.9 % (FLUSH) 0.9 %
3 SYRINGE (ML) INJECTION EVERY 12 HOURS SCHEDULED
Status: DISCONTINUED | OUTPATIENT
Start: 2021-06-14 | End: 2021-06-17 | Stop reason: HOSPADM

## 2021-06-14 RX ORDER — PANTOPRAZOLE SODIUM 40 MG/1
40 TABLET, DELAYED RELEASE ORAL
Status: DISCONTINUED | OUTPATIENT
Start: 2021-06-14 | End: 2021-06-17 | Stop reason: HOSPADM

## 2021-06-14 RX ORDER — PROPOFOL 10 MG/ML
VIAL (ML) INTRAVENOUS AS NEEDED
Status: DISCONTINUED | OUTPATIENT
Start: 2021-06-14 | End: 2021-06-14 | Stop reason: SURG

## 2021-06-14 RX ORDER — EPHEDRINE SULFATE 50 MG/ML
INJECTION, SOLUTION INTRAVENOUS AS NEEDED
Status: DISCONTINUED | OUTPATIENT
Start: 2021-06-14 | End: 2021-06-14 | Stop reason: SURG

## 2021-06-14 RX ORDER — SODIUM CHLORIDE, SODIUM LACTATE, POTASSIUM CHLORIDE, CALCIUM CHLORIDE 600; 310; 30; 20 MG/100ML; MG/100ML; MG/100ML; MG/100ML
100 INJECTION, SOLUTION INTRAVENOUS CONTINUOUS
Status: DISCONTINUED | OUTPATIENT
Start: 2021-06-14 | End: 2021-06-14 | Stop reason: HOSPADM

## 2021-06-14 RX ORDER — ACETAMINOPHEN 500 MG
1000 TABLET ORAL ONCE
Status: COMPLETED | OUTPATIENT
Start: 2021-06-14 | End: 2021-06-14

## 2021-06-14 RX ORDER — GABAPENTIN 300 MG/1
600 CAPSULE ORAL EVERY 8 HOURS SCHEDULED
Status: DISCONTINUED | OUTPATIENT
Start: 2021-06-14 | End: 2021-06-17 | Stop reason: HOSPADM

## 2021-06-14 RX ORDER — ONDANSETRON 2 MG/ML
4 INJECTION INTRAMUSCULAR; INTRAVENOUS EVERY 6 HOURS PRN
Status: DISCONTINUED | OUTPATIENT
Start: 2021-06-14 | End: 2021-06-17 | Stop reason: HOSPADM

## 2021-06-14 RX ORDER — LIDOCAINE HYDROCHLORIDE 40 MG/ML
SOLUTION TOPICAL AS NEEDED
Status: DISCONTINUED | OUTPATIENT
Start: 2021-06-14 | End: 2021-06-14 | Stop reason: SURG

## 2021-06-14 RX ORDER — MAGNESIUM HYDROXIDE 1200 MG/15ML
LIQUID ORAL AS NEEDED
Status: DISCONTINUED | OUTPATIENT
Start: 2021-06-14 | End: 2021-06-14 | Stop reason: HOSPADM

## 2021-06-14 RX ORDER — DULOXETIN HYDROCHLORIDE 30 MG/1
30 CAPSULE, DELAYED RELEASE ORAL DAILY
Status: DISCONTINUED | OUTPATIENT
Start: 2021-06-14 | End: 2021-06-17 | Stop reason: HOSPADM

## 2021-06-14 RX ORDER — FENTANYL CITRATE 50 UG/ML
25 INJECTION, SOLUTION INTRAMUSCULAR; INTRAVENOUS
Status: DISCONTINUED | OUTPATIENT
Start: 2021-06-14 | End: 2021-06-14

## 2021-06-14 RX ORDER — AMOXICILLIN 250 MG
1 CAPSULE ORAL 2 TIMES DAILY
Status: DISCONTINUED | OUTPATIENT
Start: 2021-06-14 | End: 2021-06-17 | Stop reason: HOSPADM

## 2021-06-14 RX ORDER — CETIRIZINE HYDROCHLORIDE 10 MG/1
10 TABLET ORAL DAILY
Status: DISCONTINUED | OUTPATIENT
Start: 2021-06-14 | End: 2021-06-17 | Stop reason: HOSPADM

## 2021-06-14 RX ORDER — SODIUM CHLORIDE, SODIUM LACTATE, POTASSIUM CHLORIDE, CALCIUM CHLORIDE 600; 310; 30; 20 MG/100ML; MG/100ML; MG/100ML; MG/100ML
100 INJECTION, SOLUTION INTRAVENOUS CONTINUOUS PRN
Status: DISCONTINUED | OUTPATIENT
Start: 2021-06-14 | End: 2021-06-14 | Stop reason: HOSPADM

## 2021-06-14 RX ORDER — LIDOCAINE HYDROCHLORIDE 20 MG/ML
INJECTION, SOLUTION EPIDURAL; INFILTRATION; INTRACAUDAL; PERINEURAL AS NEEDED
Status: DISCONTINUED | OUTPATIENT
Start: 2021-06-14 | End: 2021-06-14 | Stop reason: SURG

## 2021-06-14 RX ORDER — MECLIZINE HYDROCHLORIDE 25 MG/1
25 TABLET ORAL 3 TIMES DAILY PRN
Status: DISCONTINUED | OUTPATIENT
Start: 2021-06-14 | End: 2021-06-17 | Stop reason: HOSPADM

## 2021-06-14 RX ORDER — IBUPROFEN 600 MG/1
600 TABLET ORAL ONCE AS NEEDED
Status: DISCONTINUED | OUTPATIENT
Start: 2021-06-14 | End: 2021-06-14

## 2021-06-14 RX ORDER — HYDROCODONE BITARTRATE AND ACETAMINOPHEN 7.5; 325 MG/1; MG/1
2 TABLET ORAL EVERY 4 HOURS PRN
Status: DISCONTINUED | OUTPATIENT
Start: 2021-06-14 | End: 2021-06-17 | Stop reason: HOSPADM

## 2021-06-14 RX ORDER — BUPIVACAINE HCL/0.9 % NACL/PF 0.1 %
2 PLASTIC BAG, INJECTION (ML) EPIDURAL EVERY 8 HOURS
Status: COMPLETED | OUTPATIENT
Start: 2021-06-14 | End: 2021-06-14

## 2021-06-14 RX ORDER — SODIUM CHLORIDE 0.9 % (FLUSH) 0.9 %
3 SYRINGE (ML) INJECTION EVERY 12 HOURS SCHEDULED
Status: DISCONTINUED | OUTPATIENT
Start: 2021-06-14 | End: 2021-06-14 | Stop reason: HOSPADM

## 2021-06-14 RX ADMIN — Medication 2 G: at 07:11

## 2021-06-14 RX ADMIN — ACETAMINOPHEN 1000 MG: 500 TABLET, FILM COATED ORAL at 06:51

## 2021-06-14 RX ADMIN — HYDROCODONE BITARTRATE AND ACETAMINOPHEN 2 TABLET: 7.5; 325 TABLET ORAL at 19:46

## 2021-06-14 RX ADMIN — SODIUM CHLORIDE 2 G: 9 INJECTION, SOLUTION INTRAVENOUS at 14:22

## 2021-06-14 RX ADMIN — ONDANSETRON 4 MG: 2 INJECTION INTRAMUSCULAR; INTRAVENOUS at 08:23

## 2021-06-14 RX ADMIN — SODIUM CHLORIDE 2 G: 9 INJECTION, SOLUTION INTRAVENOUS at 23:26

## 2021-06-14 RX ADMIN — DOCUSATE SODIUM 50 MG AND SENNOSIDES 8.6 MG 1 TABLET: 8.6; 5 TABLET, FILM COATED ORAL at 10:52

## 2021-06-14 RX ADMIN — CETIRIZINE HYDROCHLORIDE 10 MG: 10 TABLET, FILM COATED ORAL at 10:53

## 2021-06-14 RX ADMIN — LIDOCAINE HYDROCHLORIDE 1 EACH: 40 SOLUTION TOPICAL at 07:08

## 2021-06-14 RX ADMIN — MIDAZOLAM 1 MG: 1 INJECTION INTRAMUSCULAR; INTRAVENOUS at 06:51

## 2021-06-14 RX ADMIN — HYDROMORPHONE HYDROCHLORIDE 0.5 MG: 1 INJECTION, SOLUTION INTRAMUSCULAR; INTRAVENOUS; SUBCUTANEOUS at 09:08

## 2021-06-14 RX ADMIN — SODIUM CHLORIDE 100 ML/HR: 9 INJECTION, SOLUTION INTRAVENOUS at 10:02

## 2021-06-14 RX ADMIN — LIDOCAINE HYDROCHLORIDE 100 MG: 20 INJECTION, SOLUTION EPIDURAL; INFILTRATION; INTRACAUDAL; PERINEURAL at 07:06

## 2021-06-14 RX ADMIN — PANTOPRAZOLE SODIUM 40 MG: 40 TABLET, DELAYED RELEASE ORAL at 10:16

## 2021-06-14 RX ADMIN — SODIUM CHLORIDE 100 ML/HR: 9 INJECTION, SOLUTION INTRAVENOUS at 19:47

## 2021-06-14 RX ADMIN — SUFENTANIL CITRATE 30 MCG: 50 INJECTION EPIDURAL; INTRAVENOUS at 07:30

## 2021-06-14 RX ADMIN — LISINOPRIL 5 MG: 5 TABLET ORAL at 10:53

## 2021-06-14 RX ADMIN — GABAPENTIN 600 MG: 300 CAPSULE ORAL at 21:54

## 2021-06-14 RX ADMIN — SUFENTANIL CITRATE 20 MCG: 50 INJECTION EPIDURAL; INTRAVENOUS at 07:06

## 2021-06-14 RX ADMIN — ATORVASTATIN CALCIUM 10 MG: 10 TABLET, FILM COATED ORAL at 21:54

## 2021-06-14 RX ADMIN — HYDROMORPHONE HYDROCHLORIDE 1 MG: 1 INJECTION, SOLUTION INTRAMUSCULAR; INTRAVENOUS; SUBCUTANEOUS at 12:26

## 2021-06-14 RX ADMIN — ROCURONIUM BROMIDE 10 MG: 10 INJECTION INTRAVENOUS at 07:06

## 2021-06-14 RX ADMIN — SODIUM CHLORIDE, POTASSIUM CHLORIDE, SODIUM LACTATE AND CALCIUM CHLORIDE 100 ML/HR: 600; 310; 30; 20 INJECTION, SOLUTION INTRAVENOUS at 06:24

## 2021-06-14 RX ADMIN — SODIUM CHLORIDE, POTASSIUM CHLORIDE, SODIUM LACTATE AND CALCIUM CHLORIDE: 600; 310; 30; 20 INJECTION, SOLUTION INTRAVENOUS at 08:24

## 2021-06-14 RX ADMIN — DOCUSATE SODIUM 50 MG AND SENNOSIDES 8.6 MG 1 TABLET: 8.6; 5 TABLET, FILM COATED ORAL at 21:54

## 2021-06-14 RX ADMIN — DULOXETINE HYDROCHLORIDE 30 MG: 30 CAPSULE, DELAYED RELEASE ORAL at 10:53

## 2021-06-14 RX ADMIN — ACETAMINOPHEN 650 MG: 325 TABLET, FILM COATED ORAL at 21:53

## 2021-06-14 RX ADMIN — VASOPRESSIN 1 ML: 20 INJECTION INTRAVENOUS at 07:36

## 2021-06-14 RX ADMIN — HYDROCODONE BITARTRATE AND ACETAMINOPHEN 2 TABLET: 7.5; 325 TABLET ORAL at 14:22

## 2021-06-14 RX ADMIN — HYDROMORPHONE HYDROCHLORIDE 0.5 MG: 1 INJECTION, SOLUTION INTRAMUSCULAR; INTRAVENOUS; SUBCUTANEOUS at 08:58

## 2021-06-14 RX ADMIN — Medication 160 MCG: at 08:02

## 2021-06-14 RX ADMIN — PROPOFOL 200 MG: 10 INJECTION, EMULSION INTRAVENOUS at 07:06

## 2021-06-14 RX ADMIN — GABAPENTIN 600 MG: 300 CAPSULE ORAL at 14:22

## 2021-06-14 RX ADMIN — TIZANIDINE 4 MG: 4 TABLET ORAL at 10:16

## 2021-06-14 RX ADMIN — SODIUM CHLORIDE, PRESERVATIVE FREE 3 ML: 5 INJECTION INTRAVENOUS at 21:55

## 2021-06-14 RX ADMIN — FAMOTIDINE 20 MG: 10 INJECTION INTRAVENOUS at 06:51

## 2021-06-14 RX ADMIN — EPHEDRINE SULFATE 20 MG: 50 INJECTION INTRAVENOUS at 07:16

## 2021-06-14 RX ADMIN — EPHEDRINE SULFATE 15 MG: 50 INJECTION INTRAVENOUS at 07:40

## 2021-06-14 RX ADMIN — SODIUM CHLORIDE, POTASSIUM CHLORIDE, SODIUM LACTATE AND CALCIUM CHLORIDE 1000 ML: 600; 310; 30; 20 INJECTION, SOLUTION INTRAVENOUS at 06:24

## 2021-06-14 RX ADMIN — SUCCINYLCHOLINE CHLORIDE 140 MG: 20 INJECTION, SOLUTION INTRAMUSCULAR; INTRAVENOUS at 07:06

## 2021-06-14 RX ADMIN — EPHEDRINE SULFATE 15 MG: 50 INJECTION INTRAVENOUS at 08:04

## 2021-06-14 NOTE — THERAPY EVALUATION
Patient Name: Jagdish Kulkarni  : 1975    MRN: [id number]                              Today's Date: 2021       Admit Date: 2021    Visit Dx:     ICD-10-CM ICD-9-CM   1. Decreased activities of daily living (ADL)  Z78.9 V49.89     Patient Active Problem List   Diagnosis   • Essential hypertension   • Mixed hyperlipidemia   • Obesity   • Hyperglycemia   • DDD (degenerative disc disease), lumbar   • Chronic bilateral low back pain without sciatica   • Lumbar pain     Past Medical History:   Diagnosis Date   • Chronic bilateral low back pain without sciatica 2021   • Diabetes mellitus (CMS/HCC)     border line   • Elevated cholesterol    • GERD (gastroesophageal reflux disease)    • Hyperlipidemia    • Hypertension    • PONV (postoperative nausea and vomiting)      Past Surgical History:   Procedure Laterality Date   • CHOLECYSTECTOMY     • LUMBAR SPINE SURGERY       General Information     Row Name 21 133          OT Time and Intention    Document Type  evaluation Pt s/p LLIF with instrumentation L3-4 . Sx scheduled for . PMH: HTN, depression, lumbar spine sx 2017  -JJ (r) CE (t) JJ (c)     Mode of Treatment  occupational therapy  -JJ (r) CE (t) JJ (c)     Row Name 21 1339          General Information    Patient Profile Reviewed  yes  -JJ (r) CE (t) JJ (c)     Prior Level of Function  independent:;all household mobility;ADL's;driving;work;community mobility  -JJ (r) CE (t) JJ (c)     Existing Precautions/Restrictions  brace worn when out of bed;fall;spinal  -JJ (r) CE (t) JJ (c)     Row Name 21 6315          Occupational Profile    Environmental Supports and Barriers (Occupational Profile)  walk in shower, cane, RW  -JJ (r) CE (t) JJ (c)     Row Name 21 9524          Living Environment    Lives With  spouse;child(gretchen), dependent  -JJ (r) CE (t) JJ (c)     Row Name 21 1330          Home Main Entrance    Number of Stairs, Main Entrance  six  -JJ (r) CE (t) JJ (c)      Stair Railings, Main Entrance  railing on left side (ascending)  -JJ (r) CE (t) JJ (c)     Row Name 06/14/21 1334          Stairs Within Home, Primary    Number of Stairs, Within Home, Primary  none  -JJ (r) CE (t) JJ (c)     Row Name 06/14/21 1334          Cognition    Orientation Status (Cognition)  oriented x 4  -JJ (r) CE (t) JJ (c)     Row Name 06/14/21 1334          Safety Issues, Functional Mobility    Impairments Affecting Function (Mobility)  balance;pain;strength;range of motion (ROM);endurance/activity tolerance  -JJ (r) CE (t) JJ (c)       User Key  (r) = Recorded By, (t) = Taken By, (c) = Cosigned By    Initials Name Provider Type    Cherrie Rincon, OTR/L Occupational Therapist    Kori Rocha, OT Student OT Student          Mobility/ADL's     Row Name 06/14/21 1334          Bed Mobility    Bed Mobility  rolling left;sidelying-sit;sit-sidelying  -JJ (r) CE (t) JJ (c)     Rolling Left [place] (Bed Mobility)  modified independence  -JJ (r) CE (t) JJ (c)     Sidelying-Sit [place] (Bed Mobility)  standby assist  -JJ (r) CE (t) JJ (c)     Sit-Sidelying [place] (Bed Mobility)  minimum assist (75% patient effort);1 person assist assist with RLE  -JJ (r) CE (t) JJ (c)     Assistive Device (Bed Mobility)  bed rails  -JJ (r) CE (t) JJ (c)     Row Name 06/14/21 1334          Transfers    Transfers  sit-stand transfer  -JJ (r) CE (t) JJ (c)     Sit-Stand [place] (Transfers)  contact guard;1 person assist  -JJ (r) CE (t) JJ (c)     Row Name 06/14/21 1334          Functional Mobility    Functional Mobility- Ind. Level  contact guard assist;1 person  -JJ (r) CE (t) JJ (c)     Row Name 06/14/21 1334          Activities of Daily Living    BADL Assessment/Intervention  lower body dressing;toileting;upper body dressing  -JJ (r) CE (t) JJ (c)     Row Name 06/14/21 1334          Lower Body Dressing Assessment/Training    [place] Level (Lower Body Dressing)  don;pants/bottoms;maximum  assist (25% patient effort)  -JJ (r) CE (t) JJ (c)     Position (Lower Body Dressing)  edge of bed sitting  -JJ (r) CE (t) JJ (c)     Row Name 06/14/21 1334          Toileting Assessment/Training    [place] Level (Toileting)  toileting skills;contact guard assist  -JJ (r) CE (t) JJ (c)     Assistive Devices (Toileting)  commode  -JJ (r) CE (t) JJ (c)     Position (Toileting)  unsupported standing  -JJ (r) CE (t) JJ (c)     Row Name 06/14/21 1334          Upper Body Dressing Assessment/Training    [place] Level (Upper Body Dressing)  don;doff;front opening garment;standby assist;verbal cues;nonverbal cues (demo/gesture)  -JJ (r) CE (t) JJ (c)     Position (Upper Body Dressing)  edge of bed sitting  -JJ (r) CE (t) JJ (c)     Comment (Upper Body Dressing)  LSO  -JJ (r) CE (t) JJ (c)       User Key  (r) = Recorded By, (t) = Taken By, (c) = Cosigned By    Initials Name Provider Type    Cherrie Rincon, OTR/L Occupational Therapist    Kori Rocha, OT Student OT Student        Obj/Interventions     Row Name 06/14/21 1334          Sensory Assessment (Somatosensory)    Sensory Assessment (Somatosensory)  UE sensation intact  -JJ (r) CE (t) JJ (c)     Row Name 06/14/21 1334          Vision Assessment/Intervention    Visual Impairment/Limitations  WFL  -JJ (r) CE (t) JJ (c)     Row Name 06/14/21 1334          Range of Motion Comprehensive    General Range of Motion  bilateral upper extremity ROM WFL  -JJ (r) CE (t) JJ (c)     Row Name 06/14/21 1334          Strength Comprehensive (MMT)    Comment, General Manual Muscle Testing (MMT) Assessment  BUE 5/5, pt reports weakness in RLE  -JJ (r) CE (t) JJ (c)     Row Name 06/14/21 1334          Motor Skills    Motor Skills  functional endurance  -JJ (r) CE (t) JJ (c)     Row Name 06/14/21 1334          Balance    Balance Assessment  sitting static balance;sitting dynamic balance;standing static balance;standing dynamic balance  -JJ (r) CE (t) JJ (c)     Static  Sitting Balance  WFL  -JJ (r) CE (t) JJ (c)     Dynamic Sitting Balance  WFL  -JJ (r) CE (t) JJ (c)     Static Standing Balance  mild impairment  -JJ (r) CE (t) JJ (c)     Dynamic Standing Balance  mild impairment  -JJ (r) CE (t) JJ (c)       User Key  (r) = Recorded By, (t) = Taken By, (c) = Cosigned By    Initials Name Provider Type    Cherrie Rincon, OTR/L Occupational Therapist    Kori Rocha, OT Student OT Student        Goals/Plan     Row Name 06/14/21 7467          Transfer Goal 1 (OT)    Activity/Assistive Device (Transfer Goal 1, OT)  sit-to-stand/stand-to-sit;bed-to-chair/chair-to-bed;toilet  -JJ (r) CE (t) JJ (c)     [place] Level/Cues Needed (Transfer Goal 1, OT)  independent  -JJ (r) CE (t) JJ (c)     Time Frame (Transfer Goal 1, OT)  long term goal (LTG);10 days  -JJ (r) CE (t) JJ (c)     Progress/Outcome (Transfer Goal 1, OT)  goal ongoing  -JJ (r) CE (t) JJ (c)     Row Name 06/14/21 9555          Dressing Goal 1 (OT)    Activity/Device (Dressing Goal 1, OT)  lower body dressing  -JJ (r) CE (t) JJ (c)     [place]/Cues Needed (Dressing Goal 1, OT)  minimum assist (75% or more patient effort)  -JJ (r) CE (t) JJ (c)     Time Frame (Dressing Goal 1, OT)  long term goal (LTG);10 days  -JJ (r) CE (t) JJ (c)     Progress/Outcome (Dressing Goal 1, OT)  goal ongoing  -JJ (r) CE (t) JJ (c)     Row Name 06/14/21 133          Toileting Goal 1 (OT)    Activity/Device (Toileting Goal 1, OT)  toileting skills, all  -JJ (r) CE (t) JJ (c)     [place] Level/Cues Needed (Toileting Goal 1, OT)  set-up required  -JJ (r) CE (t) JСВЕТЛАНА (c)     Time Frame (Toileting Goal 1, OT)  long term goal (LTG);10 days  -MOHSEN (r) CE (t) JСВЕТЛАНА (c)     Progress/Outcome (Toileting Goal 1, OT)  goal ongoing  -MOHSEN (r) CE (t) JСВЕТЛАНА (c)       User Key  (r) = Recorded By, (t) = Taken By, (c) = Cosigned By    Initials Name Provider Type    Cherrie Rincon, OTR/L Occupational Therapist    Kori Rocha, OT Student OT  Student        Clinical Impression     Row Name 06/14/21 9846          Pain Assessment    Additional Documentation  Pain Scale: Numbers Pre/Post-Treatment (Group)  -JJ (r) CE (t) JJ (c)     Row Name 06/14/21 4269          Pain Scale: Numbers Pre/Post-Treatment    Pretreatment Pain Rating  6/10  -JJ (r) CE (t) JJ (c)     Posttreatment Pain Rating  8/10  -JJ (r) CE (t) JJ (c)     Pain Location - Side  Right  -JJ (r) CE (t) JJ (c)     Pain Location - Orientation  lower  -JJ (r) CE (t) JJ (c)     Pain Location  back  -JJ (r) CE (t) JJ (c)     Pre/Posttreatment Pain Comment  Pt requested that OT ask nursing for pain medication after session  -JJ (r) CE (t) JJ (c)     Pain Intervention(s)  Medication (See MAR);Repositioned;Ambulation/increased activity  -JJ (r) CE (t) JJ (c)     Row Name 06/14/21 3648          Plan of Care Review    Plan of Care Reviewed With  patient;spouse  -JJ (r) CE (t) JJ (c)     Progress  no change  -JJ (r) CE (t) JJ (c)     Outcome Summary  OT eval completed. Pt alert and oriented x4 with c/o pain at incision site and lower back. Pt was educated on log rolling technique, spinal precautions, and brace management. Pt was max A to don pants and CGA for toileting. Pt donned/doffed LSO with SBA. Pt independently rolled L. Pt t/f from sidelying to sit with SBA. Pt t/f from sit to sidelying with min A to lift R leg onto bed. Pt performed sit <> stand and ambulated to bathroom and around room with CGA. Pt reports feeling weak in RLE. Pt would benefit from skilled OT to address decreased balance and ADL performance. Recommend d/c home with assist.  -JJ (r) CE (t) JJ (c)     Row Name 06/14/21 5200          Therapy Assessment/Plan (OT)    Patient/Family Therapy Goal Statement (OT)  not stated at this time  -JJ (r) CE (t) JJ (c)     Rehab Potential (OT)  good, to achieve stated therapy goals  -JСВЕТЛАНА (r) SHEILA (t) MOHSEN (c)     Criteria for Skilled Therapeutic Interventions Met (OT)  yes;skilled treatment is necessary   -JJ (r) CE (t) JJ (c)     Therapy Frequency (OT)  5 times/wk  -JJ (r) CE (t) JJ (c)     Predicted Duration of Therapy Intervention (OT)  until d/c  -JJ (r) CE (t) JJ (c)     Row Name 06/14/21 1334          Therapy Plan Review/Discharge Plan (OT)    Anticipated Discharge Disposition (OT)  home with assist  -JJ (r) CE (t) JJ (c)     Row Name 06/14/21 1334          Positioning and Restraints    Pre-Treatment Position  in bed  -JJ (r) CE (t) JJ (c)     Post Treatment Position  bed  -JJ (r) CE (t) JJ (c)     In Bed  fowlers;call light within reach;encouraged to call for assist;side rails up x2;with family/caregiver;notified nsg;SCD pump applied  -JJ (r) CE (t) JJ (c)       User Key  (r) = Recorded By, (t) = Taken By, (c) = Cosigned By    Initials Name Provider Type    Cherrie Rincon, OTR/L Occupational Therapist    Kori Rocha, OT Student OT Student        Outcome Measures     Row Name 06/14/21 9384          How much help from another is currently needed...    Putting on and taking off regular lower body clothing?  2  -JJ (r) CE (t) JJ (c)     Bathing (including washing, rinsing, and drying)  2  -JJ (r) CE (t) JJ (c)     Toileting (which includes using toilet bed pan or urinal)  3  -JJ (r) CE (t) JJ (c)     Putting on and taking off regular upper body clothing  4  -JJ (r) CE (t) JJ (c)     Taking care of personal grooming (such as brushing teeth)  4  -JJ (r) CE (t) JJ (c)     Eating meals  4  -JJ (r) CE (t) JJ (c)     AM-PAC 6 Clicks Score (OT)  19  -JJ (r) CE (t)     Row Name 06/14/21 1321          How much help from another person do you currently need...    Turning from your back to your side while in flat bed without using bedrails?  3  (Pended)   -HW     Moving from lying on back to sitting on the side of a flat bed without bedrails?  3  (Pended)   -HW     Moving to and from a bed to a chair (including a wheelchair)?  3  (Pended)   -HW     Standing up from a chair using your arms (e.g., wheelchair,  bedside chair)?  3  (Pended)   -HW     Climbing 3-5 steps with a railing?  2  (Pended)   -HW     To walk in hospital room?  3  (Pended)   -HW     AM-PAC 6 Clicks Score (PT)  17  (Pended)   -CK (r) HW (t)     Row Name 06/14/21 1334 06/14/21 1321       Functional Assessment    Outcome Measure Options  AM-PAC 6 Clicks Daily Activity (OT)  -JСВЕТЛАНА (r) CE (t) JJ (c)  AM-PAC 6 Clicks Basic Mobility (PT)  (Pended)   -      User Key  (r) = Recorded By, (t) = Taken By, (c) = Cosigned By    Initials Name Provider Type    Cherrie Rincon, OTR/L Occupational Therapist    Kevin Beckwith, RN Registered Nurse    Katherine Patton, PT Student PT Student    Kori Rocha, OT Student OT Student        Occupational Therapy Education                 Title: PT OT SLP Therapies (In Progress)     Topic: Occupational Therapy (In Progress)     Point: ADL training (Done)     Description:   Instruct learner(s) on proper safety adaptation and remediation techniques during self care or transfers.   Instruct in proper use of assistive devices.              Learning Progress Summary           Patient Acceptance, E,TB, VU by CE at 6/14/2021 1443    Comment: Pt educated on safety with ADL performance, spinal precautions, brace management, and log rolling method.                   Point: Home exercise program (Not Started)     Description:   Instruct learner(s) on appropriate technique for monitoring, assisting and/or progressing therapeutic exercises/activities.              Learner Progress:  Not documented in this visit.          Point: Precautions (Done)     Description:   Instruct learner(s) on prescribed precautions during self-care and functional transfers.              Learning Progress Summary           Patient Acceptance, E,TB, VU by CE at 6/14/2021 1443    Comment: Pt educated on safety with ADL performance, spinal precautions, brace management, and log rolling method.                   Point: Body mechanics (Not Started)      Description:   Instruct learner(s) on proper positioning and spine alignment during self-care, functional mobility activities and/or exercises.              Learner Progress:  Not documented in this visit.                      User Key     Initials Effective Dates Name Provider Type Discipline    CE 05/10/21 -  Stacey Rolon OT Student OT Student OT              OT Recommendation and Plan  Therapy Frequency (OT): 5 times/wk  Plan of Care Review  Plan of Care Reviewed With: patient, spouse  Progress: no change  Outcome Summary: OT eval completed. Pt alert and oriented x4 with c/o pain at incision site and lower back. Pt was educated on log rolling technique, spinal precautions, and brace management. Pt was max A to don pants and CGA for toileting. Pt donned/doffed LSO with SBA. Pt independently rolled L. Pt t/f from sidelying to sit with SBA. Pt t/f from sit to sidelying with min A to lift R leg onto bed. Pt performed sit <> stand and ambulated to bathroom and around room with CGA. Pt reports feeling weak in RLE. Pt would benefit from skilled OT to address decreased balance and ADL performance. Recommend d/c home with assist.     Time Calculation:   Time Calculation- OT     Row Name 06/14/21 1334             Time Calculation- OT    OT Start Time  1334  -JJ (r) CE (t) JJ (c)      OT Stop Time  1416  -JJ (r) CE (t) JJ (c)      OT Time Calculation (min)  42 min  -JJ (r) CE (t)      OT Received On  06/14/21  -JJ (r) CE (t) JJ (c)      OT Goal Re-Cert Due Date  06/24/21  -JJ (r) CE (t) JJ (c)        User Key  (r) = Recorded By, (t) = Taken By, (c) = Cosigned By    Initials Name Provider Type    Cherrie Rincon, DOMINICR/L Occupational Therapist    Stacey Rocha OT Student OT Student                 STACEY ROLON OT Student  6/14/2021

## 2021-06-14 NOTE — ANESTHESIA POSTPROCEDURE EVALUATION
Patient: Jagdish Kulkarni    Procedure Summary     Date: 06/14/21 Room / Location:  [facility]  /  [facility]    Anesthesia Start: 0701 Anesthesia Stop: 0843    Procedure: RIGHT LATERAL LUMBAR INTERBODY FUSION WITH INSTRUMENTATION L3-4 (Right Spine Lumbar) Diagnosis: (M54.16)    Surgeons: EDIE Castro MD Provider: Kirstin Dalton CRNA    Anesthesia Type: general ASA Status: 2          Anesthesia Type: general    Vitals  Vitals Value Taken Time   /95 06/14/21 0938   Temp 97.5 °F (36.4 °C) 06/14/21 0930   Pulse 82 06/14/21 0938   Resp 14 06/14/21 0930   SpO2 100 % 06/14/21 0938   Vitals shown include unvalidated device data.        Post Anesthesia Care and Evaluation    Patient location during evaluation: PACU  Patient participation: complete - patient participated  Level of consciousness: awake and alert  Pain management: adequate  Airway patency: patent  Anesthetic complications: No anesthetic complications  PONV Status: none  Cardiovascular status: acceptable and hemodynamically stable  Respiratory status: acceptable  Hydration status: acceptable    Comments: Blood pressure 146/90, pulse 80, temperature 97.5 °F (36.4 °C), temperature source Temporal, resp. rate 14, SpO2 100 %.    Patient discharged from PACU based upon Teresa score. Please see RN notes for further details

## 2021-06-14 NOTE — THERAPY EVALUATION
Patient Name: Jagdish Kulkarni  : 1975    MRN: [id number]                              Today's Date: 2021       Admit Date: 2021    Visit Dx:     ICD-10-CM ICD-9-CM   1. Decreased activities of daily living (ADL)  Z78.9 V49.89   2. Impaired mobility  Z74.09 799.89     Patient Active Problem List   Diagnosis   • Essential hypertension   • Mixed hyperlipidemia   • Obesity   • Hyperglycemia   • DDD (degenerative disc disease), lumbar   • Chronic bilateral low back pain without sciatica   • Lumbar pain     Past Medical History:   Diagnosis Date   • Chronic bilateral low back pain without sciatica 2021   • Diabetes mellitus (CMS/HCC)     border line   • Elevated cholesterol    • GERD (gastroesophageal reflux disease)    • Hyperlipidemia    • Hypertension    • PONV (postoperative nausea and vomiting)      Past Surgical History:   Procedure Laterality Date   • CHOLECYSTECTOMY     • LUMBAR SPINE SURGERY       General Information     Row Name 21 132          Physical Therapy Time and Intention    Document Type  evaluation CC: recurrent LB pain. B buttock, thigh, and leg radiculopathy. PMH: HTN, depression, lumbar spine sx (2017). S/p:R lateral lumbar interbody fusion with instrumentation L3-4  -SB (r) HW (t) SB (c)     Mode of Treatment  physical therapy  -SB (r) HW (t) SB (c)     Row Name 21 132          General Information    Patient Profile Reviewed  yes  -SB (r) HW (t) SB (c)     Prior Level of Function  independent:;driving;all household mobility;community mobility;gait;transfer;bathing;dressing;grooming;home management  -SB (r) HW (t) SB (c)     Existing Precautions/Restrictions  (S) fall;spinal;brace worn when out of bed  -SB (r) HW (t) SB (c)     Row Name 21 1321          Living Environment    Lives With  spouse;child(gretchen), dependent;child(gretchen), adult SC s/p 2017 sx. Access to RW. walk in shower  -SB (r) HW (t) SB (c)     Row Name 21 1321          Home Main Entrance     Number of Stairs, Main Entrance  six  -SB (r) HW (t) SB (c)     Stair Railings, Main Entrance  railing on left side (ascending)  -SB (r) HW (t) SB (c)     Row Name 06/14/21 1321          Stairs Within Home, Primary    Number of Stairs, Within Home, Primary  none  -SB (r) HW (t) SB (c)     Row Name 06/14/21 1321          Cognition    Orientation Status (Cognition)  oriented x 4  -SB (r) HW (t) SB (c)     Row Name 06/14/21 1321          Safety Issues, Functional Mobility    Impairments Affecting Function (Mobility)  pain;balance;strength;range of motion (ROM);endurance/activity tolerance  -SB (r) HW (t) SB (c)       User Key  (r) = Recorded By, (t) = Taken By, (c) = Cosigned By    Initials Name Provider Type    SB Janey Lozano, PT DPT Physical Therapist    HW Katherine Beckham, IBAN Student PT Student        Mobility     Row Name 06/14/21 1321          Bed Mobility    Bed Mobility  sidelying-sit;sit-sidelying;rolling left  -SB (r) HW (t) SB (c)     Rolling Left Henry (Bed Mobility)  modified independence  -SB (r) HW (t) SB (c)     Sidelying-Sit [place] (Bed Mobility)  standby assist;1 person assist  -SB (r) HW (t) SB (c)     Sit-Sidelying Henry (Bed Mobility)  minimum assist (75% patient effort);1 person assist  -SB (r) HW (t) SB (c)     Assistive Device (Bed Mobility)  head of bed elevated;bed rails  -SB (r) HW (t) SB (c)     [facility] Name 06/14/21 1321          Sit-Stand Transfer    Sit-Stand Henry (Transfers)  contact guard;1 person assist  -SB (r) HW (t) SB (c)     [facility] Name 06/14/21 1321          Gait/Stairs (Locomotion)    Henry Level (Gait)  contact guard;1 person assist  -SB (r) HW (t) SB (c)     Distance in Feet (Gait)  20  -SB (r) HW (t) SB (c)     Deviations/Abnormal Patterns (Gait)  gait speed decreased;shanta decreased;antalgic;base of support, wide;weight shifting decreased;stride length decreased  -SB (r) HW (t) SB (c)       User Key  (r) = Recorded By, (t) = Taken By, (c) =  Cosigned By    Initials Name Provider Type    Janey Salguero, PT DPT Physical Therapist    Katherine Patton, PT Student PT Student        Obj/Interventions     Row Name 06/14/21 1321          Range of Motion Comprehensive    General Range of Motion  bilateral lower extremity ROM WFL  -SB (r) HW (t) SB (c)     Comment, General Range of Motion  B Active hip flexion limited >90 due to strength deficits  -SB (r) HW (t) SB (c)     Row Name 06/14/21 1321          Strength Comprehensive (MMT)    General Manual Muscle Testing (MMT) Assessment  lower extremity strength deficits identified  -SB (r) HW (t) SB (c)     Comment, General Manual Muscle Testing (MMT) Assessment  R hip flex 3+/5 painful, R hip ext, ankle DF 4+/5, LLE grossly 4+/5 painful hip flex  -SB (r) HW (t) SB (c)     Row Name 06/14/21 1321          Balance    Balance Assessment  sitting static balance;sitting dynamic balance;standing static balance;standing dynamic balance  -SB (r) HW (t) SB (c)     Static Sitting Balance  WFL  -SB (r) HW (t) SB (c)     Dynamic Sitting Balance  WFL  -SB (r) HW (t) SB (c)     Static Standing Balance  WFL  -SB (r) HW (t) SB (c)     Dynamic Standing Balance  mild impairment  -SB (r) HW (t) SB (c)     Comment, Balance  pt grabs for door frames and IV pole during amb in room  -SB (r) HW (t) SB (c)     Row Name 06/14/21 1321          Sensory Assessment (Somatosensory)    Sensory Assessment (Somatosensory)  LE sensation intact  -SB (r) HW (t) SB (c)       User Key  (r) = Recorded By, (t) = Taken By, (c) = Cosigned By    Initials Name Provider Type    Janey Salguero, PT DPT Physical Therapist    Katherine Patton, PT Student PT Student        Goals/Plan     Row Name 06/14/21 1321          Bed Mobility Goal 1 (PT)    Activity/Assistive Device (Bed Mobility Goal 1, PT)  bed mobility activities, all  -SB (r) HW (t) SB (c)     [place] Level/Cues Needed (Bed Mobility Goal 1, PT)  independent  -SB (r) HW (t) SB (c)     Time  Frame (Bed Mobility Goal 1, PT)  long term goal (LTG)  -SB (r) HW (t) SB (c)     Progress/Outcomes (Bed Mobility Goal 1, PT)  goal ongoing  -SB (r) HW (t) SB (c)     Row Name 06/14/21 1321          Transfer Goal 1 (PT)    Activity/Assistive Device (Transfer Goal 1, PT)  sit-to-stand/stand-to-sit;bed-to-chair/chair-to-bed;walker, rolling  -SB (r) HW (t) SB (c)     [place]ennett Level/Cues Needed (Transfer Goal 1, PT)  supervision required  -SB (r) HW (t) SB (c)     Time Frame (Transfer Goal 1, PT)  long term goal (LTG)  -SB (r) HW (t) SB (c)     Progress/Outcome (Transfer Goal 1, PT)  goal ongoing  -SB (r) HW (t) SB (c)     Row Name 06/14/21 1321          Gait Training Goal 1 (PT)    Activity/Assistive Device (Gait Training Goal 1, PT)  gait (walking locomotion);assistive device use;decrease fall risk;diminish gait deviation;increase energy conservation;increase endurance/gait distance;improve balance and speed;forward stepping;normalize weight shifts;walker, rolling  -SB (r) HW (t) SB (c)     Bennett Level (Gait Training Goal 1, PT)  standby assist;1 person assist  -SB (r) HW (t) SB (c)     Distance (Gait Training Goal 1, PT)  350  -SB (r) HW (t) SB (c)     Time Frame (Gait Training Goal 1, PT)  long term goal (LTG)  -SB (r) HW (t) SB (c)     Progress/Outcome (Gait Training Goal 1, PT)  goal ongoing  -SB (r) HW (t) SB (c)     Row Name 06/14/21 1321          Stairs Goal 1 (PT)    Activity/Assistive Device (Stairs Goal 1, PT)  --  -SB (r) HW (t) SB (c)     [place] Level/Cues Needed (Stairs Goal 1, PT)  --  -SB (r) HW (t) SB (c)     Number of Stairs (Stairs Goal 1, PT)  --  -SB (r) HW (t) SB (c)     Time Frame (Stairs Goal 1, PT)  --  -SB (r) HW (t) SB (c)     Progress/Outcome (Stairs Goal 1, PT)  --  -SB (r) HW (t) SB (c)       User Key  (r) = Recorded By, (t) = Taken By, (c) = Cosigned By    Initials Name Provider Type    Janey Salguero, PT DPT Physical Therapist    Katherine Patton, PT Student PT  Student        Clinical Impression     Row Name 06/14/21 1321          Pain    Additional Documentation  Pain Scale: Numbers Pre/Post-Treatment (Group)  -SB (r) HW (t) SB (c)     Row Name 06/14/21 1321          Pain Scale: Numbers Pre/Post-Treatment    Pretreatment Pain Rating  6/10  -SB (r) HW (t) SB (c)     Posttreatment Pain Rating  8/10  -SB (r) HW (t) SB (c)     Pain Location - Side  Right  -SB (r) HW (t) SB (c)     Pain Location - Orientation  lower  -SB (r) HW (t) SB (c)     Pain Location  back;flank  -SB (r) HW (t) SB (c)     Pain Intervention(s)  Repositioned;Ambulation/increased activity  -SB (r) HW (t) SB (c)     Row Name 06/14/21 1321          Plan of Care Review    Plan of Care Reviewed With  patient;spouse  -SB (r) HW (t) SB (c)     Progress  no change  -SB (r) HW (t) SB (c)     Outcome Summary  PT Eval completed. Pt fowlers in bed, AAOx4 with wife present. Pt c/o of 6/10 pain to R LB and flank near incision. Pt edu on log rolling, spinal precautions, and donning brace with all activity. Pt demos L rolling Liya w/ bed rails, sidelying<>sit req SBAx1 and vc to maintain spinal precautions. Pt effectively donned brace at EOB with min assist. Pt performed sit<>stand and amb 20 ft in room w/ CGAx1. Mild instability during amb as pt would grab onto IV poll and door frames. Recommend amb with RW for future treatments.. Pt amb to chair, but was unable to get comfortable. Assisted back to bed. Pt demos sidelying<>sit with MinAx1. C/o 8/10 pain post eval. Pt returned to fowlers in bed with call light in reach and spouse present. Nsg notified of pt request for pain meds. PT indicated to improve strength, balance, safety with amb and activity tolerance. D/c recommend home with assist.  -SB (r) HW (t) SB (c)     Row Name 06/14/21 1321          Therapy Assessment/Plan (PT)    Patient/Family Therapy Goals Statement (PT)  return to PLOF  -SB (r) HW (t) SB (c)     Rehab Potential (PT)  good, to achieve stated therapy  goals  -SB (r) HW (t) SB (c)     Criteria for Skilled Interventions Met (PT)  yes;meets criteria;skilled treatment is necessary  -SB (r) HW (t) SB (c)     Predicted Duration of Therapy Intervention (PT)  until d/c or goals met  -SB (r) HW (t) SB (c)     Row Name 06/14/21 1321          Vital Signs    Pre SpO2 (%)  95  -SB (r) HW (t) SB (c)     O2 Delivery Pre Treatment  room air  -SB (r) HW (t) SB (c)     O2 Delivery Intra Treatment  room air  -SB (r) HW (t) SB (c)     Post SpO2 (%)  100  -SB (r) HW (t) SB (c)     O2 Delivery Post Treatment  room air  -SB (r) HW (t) SB (c)     Pre Patient Position  Supine  -SB (r) HW (t) SB (c)     Intra Patient Position  Standing  -SB (r) HW (t) SB (c)     Post Patient Position  Supine  -SB (r) HW (t) SB (c)     Row Name 06/14/21 1321          Positioning and Restraints    Pre-Treatment Position  in bed  -SB (r) HW (t) SB (c)     Post Treatment Position  bed  -SB (r) HW (t) SB (c)     In Bed  fowlers;side rails up x2;encouraged to call for assist;call light within reach;with family/caregiver  -SB (r) HW (t) SB (c)       User Key  (r) = Recorded By, (t) = Taken By, (c) = Cosigned By    Initials Name Provider Type    Janey Salguero, PT DPT Physical Therapist    Katherine Patton, IBAN Student PT Student        Outcome Measures     Row Name 06/14/21 1321          How much help from another person do you currently need...    Turning from your back to your side while in flat bed without using bedrails?  3  -SB (r) HW (t) SB (c)     Moving from lying on back to sitting on the side of a flat bed without bedrails?  3  -SB (r) HW (t) SB (c)     Moving to and from a bed to a chair (including a wheelchair)?  3  -SB (r) HW (t) SB (c)     Standing up from a chair using your arms (e.g., wheelchair, bedside chair)?  3  -SB (r) HW (t) SB (c)     Climbing 3-5 steps with a railing?  2  -SB (r) HW (t) SB (c)     To walk in hospital room?  3  -SB (r) HW (t) SB (c)     AM-PAC 6 Clicks Score (PT)  17   -SB (r) HW (t)     Row Name 06/14/21 1334 06/14/21 1321       Functional Assessment    Outcome Measure Options  AM-PAC 6 Clicks Daily Activity (OT)  -JСВЕТЛАНА (r) CE (t) JСВЕТЛАНА (c)  AM-PAC 6 Clicks Basic Mobility (PT)  -SB (r) HW (t) SB (c)      User Key  (r) = Recorded By, (t) = Taken By, (c) = Cosigned By    Initials Name Provider Type    SB Janey Lozano, PT DPT Physical Therapist    Cherrie Rincon, OTR/L Occupational Therapist    HW Katherine Beckham, PT Student PT Student    Kori Rocha, OT Student OT Student        Physical Therapy Education                 Title: PT OT SLP Therapies (In Progress)     Topic: Physical Therapy (Done)     Point: Mobility training (Done)     Learning Progress Summary           Patient Acceptance, E,TB, VU,DU by  at 6/14/2021 1430    Comment: Pt educated on spinal precautions including log rolling for bed mobility and proper donning/doffing of brace before OOB activity.   Family Acceptance, E,TB, VU,DU by  at 6/14/2021 1430    Comment: Pt educated on spinal precautions including log rolling for bed mobility and proper donning/doffing of brace before OOB activity.                   Point: Body mechanics (Done)     Learning Progress Summary           Patient Acceptance, E,TB, VU,DU by  at 6/14/2021 1430    Comment: Pt educated on spinal precautions including log rolling for bed mobility and proper donning/doffing of brace before OOB activity.   Family Acceptance, E,TB, VU,DU by  at 6/14/2021 1430    Comment: Pt educated on spinal precautions including log rolling for bed mobility and proper donning/doffing of brace before OOB activity.                   Point: Precautions (Done)     Learning Progress Summary           Patient Acceptance, E,TB, VU,DU by  at 6/14/2021 1430    Comment: Pt educated on spinal precautions including log rolling for bed mobility and proper donning/doffing of brace before OOB activity.   Family Acceptance, E,TB, VU,DU by  at 6/14/2021 1430     Comment: Pt educated on spinal precautions including log rolling for bed mobility and proper donning/doffing of brace before OOB activity.                               User Key     Initials Effective Dates Name Provider Type Discipline     04/22/21 -  Katherine Beckham, PT Student PT Student PT              PT Recommendation and Plan  Planned Therapy Interventions (PT): balance training, bed mobility training, gait training, postural re-education, patient/family education, strengthening, transfer training, ROM (range of motion)  Plan of Care Reviewed With: patient, spouse  Progress: no change  Outcome Summary: PT Eval completed. Pt fowlers in bed, AAOx4 with wife present. Pt c/o of 6/10 pain to R LB and flank near incision. Pt edu on log rolling, spinal precautions, and donning brace with all activity. Pt demos L rolling Liya w/ bed rails, sidelying<>sit req SBAx1 and vc to maintain spinal precautions. Pt effectively donned brace at EOB with min assist. Pt performed sit<>stand and amb 20 ft in room w/ CGAx1. Mild instability during amb as pt would grab onto IV poll and door frames. Recommend amb with RW for future treatments.. Pt amb to chair, but was unable to get comfortable. Assisted back to bed. Pt demos sidelying<>sit with MinAx1. C/o 8/10 pain post eval. Pt returned to fowlers in bed with call light in reach and spouse present. Nsg notified of pt request for pain meds. PT indicated to improve strength, balance, safety with amb and activity tolerance. D/c recommend home with assist.     Time Calculation:   PT Charges     Row Name 06/14/21 1431             Time Calculation    Start Time  1321  -SB (r) HW (t) SB (c)      Stop Time  1416  -SB (r) HW (t) SB (c)      Time Calculation (min)  55 min  -SB (r) HW (t)      PT Received On  06/14/21  -SB (r) HW (t) SB (c)      PT Goal Re-Cert Due Date  06/24/21  -SB (r) HW (t) SB (c)        User Key  (r) = Recorded By, (t) = Taken By, (c) = Cosigned By    Initials Name  Provider Type    SB Janey Lozano, PT DPT Physical Therapist    Katherine Patton, PT Student PT Student            PT G-Codes  Outcome Measure Options: AM-PAC 6 Clicks Daily Activity (OT)  AM-PAC 6 Clicks Score (PT): 17  AM-PAC 6 Clicks Score (OT): 19    Katherine Beckham PT Student  6/14/2021

## 2021-06-14 NOTE — OP NOTE
LUMBAR LATERAL INTERBODY FUSION  Procedure Note    Jagdish Kulkarni  6/14/2021    Pre-op Diagnosis:     1. Status post left LLIF L4-5, Dr. Mitul Schafer, 05/16/2017.  2. Status post TLIF L5-S1, PSF with instrumentation L4 to S1, Dr. Mitul Schafer, 06/01/2017.  3. Recurrent low back pain.  4. Recurrent bilateral buttock, thigh and leg radiculopathy, left worse than right.  5. Neurogenic claudication.  6. Adjacent level degenerative disk disease, L3-4.  7. Facet arthropathy, L3-4.  8. Foraminal disk herniation, left L3-4.  9. Congenital short pedicle syndrome.  10. Central and bilateral foraminal stenosis, left worse than right L3-4.    Post-op Diagnosis:    same    Procedure/CPT® Codes:    1.  Right lateral lumbar interbody fusion L3-4  2.  Anterior spinal instrumentation L3-4 ([employer] lateral plate and screws)  3.  Use of PEEK interbody biomechanical device for fusion L3-4 ([employer] porous PEEK spacer)  4.  Use of allograft bone matrix for fusion  5.  Use of fluoroscopy for confirmation of surgical level, placement of PEEK spacer and instrumentation  6.  Intraoperative neural monitoring    Anesthesia: General    Surgeon: INA Castro MD    Assistant: Conrad Burkett PA-C, Sid Faust PA-C    Estimated Blood Loss: Minimal    Complications: None    Condition: Stable to PACU.    Indications:    The patient is a 45-year-old who sees Dr. Avtar Guerrero for medical issues.  He presented to the office with a history of a prior staged fusion procedure from L4-S1 performed by my partner Dr. Mitul Schafer in 2017.  Unfortunately, he presented with recurrent low back pain as well as a recurrence of symptoms down his lower extremities, with the left side worse than the right.  His symptoms were consistent with neurogenic claudication.  Imaging studies revealed adjacent level degenerative disc disease and facet arthropathy at L3-4 along with a foraminal disc herniation and congenital short pedicle syndrome causing central and bilateral  foraminal stenosis that was worse on the left than the right and matched his symptoms.    After failing conservative measures, it was mutually decided that surgery would be the best option. Risks, benefits, and complications of surgery were discussed with the patient. The patient appeared well informed and wished to proceed. We specifically discussed the risk of infection, blood loss, nerve root injury, CSF leak, and the possibility of incomplete resolution of symptoms. We also discussed the possible risk of a nonunion and the potential need for additional surgery in the event of a pseudoarthrosis or hardware failure.     We elected proceed with a staged operation.  It is planned we will be proceeding with a second posterior procedure at a later date.    Operative Procedure:    After obtaining informed consent and verifying the correct operative site, the patient was brought to the operating room and placed supine on operating table.  A general anesthetic was provided by the anesthesia service with the assistance of an endotracheal tube.  Once this was appropriately positioned and secured, the patient was carefully rotated into the lateral decubitus position with the right side up. All bony prominences were well-padded.  3 inch wide cloth tape was used to maintain the patient's position.  It was also used to tip open the pelvis slightly allowing better access to the lumbar spine through a lateral approach.  Fluoroscopy was then used to identify the L3-4 level, and the skin was marked for our planned incision.  The right flank was then prepped and draped in the usual sterile fashion.  A surgical timeout was taken to confirm this was the correct patient, we were working at the correct level, and that preoperative antibiotics were given in a timely fashion.    An oblique incision was created of the right flank using a 10 blade scalpel directly centered over the L3-4 segment. Dissection was carried bluntly through  subcutaneous tissues. Blunt dissection was also carried between the external oblique and internal oblique musculature. The transversalis fascia was pierced allowing access to the retroperitoneal space. At this point a series of neuromonitoring probes were used to safely access the L3-4 level. We used stimulated and free run EMG for this purpose. Once nerve roots were confirmed to be out of harm's way, self retaining retractors were placed for continuous exposure.     An annulotomy was then created at the L3-4 area using a 15 blade scalpel on a long handle. A Ma elevator was used to remove disc material off of the endplates. Disc material was removed using Kerrisons, pituitaries, and forward angled curettes. Once all disc material had been retrieved, I used the Ma elevator to divide the contralateral annulus. This assisted with mobilization of the vertebral body. We then used a series of endplate scrapers to prepare the endplates for interbody fusion. Trial spacers were malleted into position and for the disc space it was felt that a 12 mm x 55 mm implant would be the best fit to restore disc height. The disc space was then thoroughly irrigated with saline solution.     A porous PEEK spacer from the [employer] instrumentation set measuring 12 mm x 55 mm x 22 mm was then packed with allograft bone matrix as tightly as possible. This PEEK spacer was then malleted into the L3-4 disc space under fluoroscopic guidance. It was placed as a PEEK interbody biomechanical device to assist with interbody fusion. Once this spacer was confirmed to be properly positioned, I chose a 2-hole plate to help augment the fusion of L3-4. This plate was held into position using screws. I placed a 50 mm screw into L3 and I placed a shorter 35 mm screw into L4. The shorter screw was used to hopefully accommodate the placement of a pedicle screw on the contralateral L4 pedicle as part of the next stage of the procedure.     The wound was  then irrigated thoroughly. A thorough inspection was then undertaken to ensure that we had adequate hemostasis. Bleeding at this point was controlled using thrombin with Gelfoam powder and bipolar cautery. Closure was then accomplished with a #1 Vicryl reapproximating the internal and external oblique musculature. Immediate subcutaneous cutaneous tissues were closed with a 3-0 Vicryl. And skin closure was accomplished using Mastisol and Steri-Strips. The wound was then sterilely dressed. The patient was then carefully rotated supine onto a hospital rClymer, extubated, and sent to the recovery room in good stable condition.     The patient tolerated the procedure well. There were no complications. We estimate blood loss to be minimal. The patient remained hemodynamically stable.     Conrad Burkett PA-C provided critical assistance during the procedure. His assistance was medically necessary in order to allow the procedure to occur in the most safe and efficient manner.    INA Castro MD     Date: 6/14/2021  Time: 09:16 CDT

## 2021-06-14 NOTE — ANESTHESIA PREPROCEDURE EVALUATION
Anesthesia Evaluation     Patient summary reviewed and Nursing notes reviewed   history of anesthetic complications: PONV  NPO Solid Status: > 8 hours             Airway   Mallampati: I  TM distance: >3 FB  Neck ROM: full  No difficulty expected  Dental      Pulmonary    (-) sleep apnea, not a smoker  Cardiovascular   Exercise tolerance: poor (<4 METS)    ECG reviewed    (+) hypertension, hyperlipidemia,   (-) CAD, cardiac stents      Neuro/Psych  (-) seizures, TIA, CVA  GI/Hepatic/Renal/Endo    (+) obesity,  GERD,  diabetes mellitus (borderline),   (-) liver disease, no renal disease    Musculoskeletal     Abdominal    Substance History      OB/GYN          Other   arthritis,                      Anesthesia Plan    ASA 2     general     intravenous induction     Anesthetic plan, all risks, benefits, and alternatives have been provided, discussed and informed consent has been obtained with: patient.

## 2021-06-14 NOTE — ANESTHESIA PROCEDURE NOTES
Airway  Urgency: elective    Date/Time: 6/14/2021 7:08 AM  Airway not difficult    General Information and Staff    Patient location during procedure: OR  CRNA: Kirstin Dalton CRNA    Indications and Patient Condition  Indications for airway management: airway protection    Preoxygenated: yes  Mask difficulty assessment: 1 - vent by mask    Final Airway Details  Final airway type: endotracheal airway      Successful airway: ETT  Cuffed: yes   Successful intubation technique: direct laryngoscopy  Facilitating devices/methods: intubating stylet  Endotracheal tube insertion site: oral  Blade: Noah  Blade size: 3.5  ETT size (mm): 7.5  Cormack-Lehane Classification: grade IIa - partial view of glottis  Placement verified by: chest auscultation and capnometry   Cuff volume (mL): 5  Measured from: lips  ETT/EBT  to lips (cm): 23  Number of attempts at approach: 1  Assessment: lips, teeth, and gum same as pre-op and atraumatic intubation

## 2021-06-14 NOTE — PLAN OF CARE
Goal Outcome Evaluation:  Plan of Care Reviewed With: patient, spouse        Progress: no change  Outcome Summary: Pt a&ox4. Sx today, dressing CDI. C/o pain, good relief noted with prn orders. IVF & abx infusing per order. Voiding spont. SCDs for VTE. Safety maintained. Venancio continue POC.

## 2021-06-14 NOTE — THERAPY EVALUATION
Patient Name: Jagdish Kulkarni  : 1975    MRN: [id number]                              Today's Date: 2021       Admit Date: 2021    Visit Dx:     ICD-10-CM ICD-9-CM   1. Decreased activities of daily living (ADL)  Z78.9 V49.89     Patient Active Problem List   Diagnosis   • Essential hypertension   • Mixed hyperlipidemia   • Obesity   • Hyperglycemia   • DDD (degenerative disc disease), lumbar   • Chronic bilateral low back pain without sciatica   • Lumbar pain     Past Medical History:   Diagnosis Date   • Chronic bilateral low back pain without sciatica 2021   • Diabetes mellitus (CMS/HCC)     border line   • Elevated cholesterol    • GERD (gastroesophageal reflux disease)    • Hyperlipidemia    • Hypertension    • PONV (postoperative nausea and vomiting)      Past Surgical History:   Procedure Laterality Date   • CHOLECYSTECTOMY     • LUMBAR SPINE SURGERY       General Information     Row Name 21 133          OT Time and Intention    Document Type  evaluation Pt s/p LLIF with instrumentation L3-4 . Sx scheduled for . PMH: HTN, depression, lumbar spine sx 2017  -JJ (r) CE (t) JJ (c)     Mode of Treatment  occupational therapy  -JJ (r) CE (t) JJ (c)     Row Name 21 1337          General Information    Patient Profile Reviewed  yes  -JJ (r) CE (t) JJ (c)     Prior Level of Function  independent:;all household mobility;ADL's;driving;work;community mobility  -JJ (r) CE (t) JJ (c)     Existing Precautions/Restrictions  brace worn when out of bed;fall;spinal  -JJ (r) CE (t) JJ (c)     Row Name 21 1102          Occupational Profile    Environmental Supports and Barriers (Occupational Profile)  walk in shower, cane, RW  -JJ (r) CE (t) JJ (c)     Row Name 21 6401          Living Environment    Lives With  spouse;child(gretchen), dependent  -JJ (r) CE (t) JJ (c)     Row Name 21 1336          Home Main Entrance    Number of Stairs, Main Entrance  six  -JJ (r) CE (t) JJ (c)      Stair Railings, Main Entrance  railing on left side (ascending)  -JJ (r) CE (t) JJ (c)     Row Name 06/14/21 1334          Stairs Within Home, Primary    Number of Stairs, Within Home, Primary  none  -JJ (r) CE (t) JJ (c)     Row Name 06/14/21 1334          Cognition    Orientation Status (Cognition)  oriented x 4  -JJ (r) CE (t) JJ (c)     Row Name 06/14/21 1334          Safety Issues, Functional Mobility    Impairments Affecting Function (Mobility)  balance;pain;strength;range of motion (ROM);endurance/activity tolerance  -JJ (r) CE (t) JJ (c)       User Key  (r) = Recorded By, (t) = Taken By, (c) = Cosigned By    Initials Name Provider Type    Cherrie Rincon, OTR/L Occupational Therapist    Kori Rocha, OT Student OT Student          Mobility/ADL's     Row Name 06/14/21 1334          Bed Mobility    Bed Mobility  rolling left;sidelying-sit;sit-sidelying  -JJ (r) CE (t) JJ (c)     Rolling Left [place] (Bed Mobility)  modified independence  -JJ (r) CE (t) JJ (c)     Sidelying-Sit [place] (Bed Mobility)  standby assist  -JJ (r) CE (t) JJ (c)     Sit-Sidelying [place] (Bed Mobility)  minimum assist (75% patient effort);1 person assist assist with RLE  -JJ (r) CE (t) JJ (c)     Assistive Device (Bed Mobility)  bed rails  -JJ (r) CE (t) JJ (c)     Row Name 06/14/21 1334          Transfers    Transfers  sit-stand transfer  -JJ (r) CE (t) JJ (c)     Sit-Stand [place] (Transfers)  contact guard;1 person assist  -JJ (r) CE (t) JJ (c)     Row Name 06/14/21 1334          Functional Mobility    Functional Mobility- Ind. Level  contact guard assist;1 person  -JJ (r) CE (t) JJ (c)     Row Name 06/14/21 1334          Activities of Daily Living    BADL Assessment/Intervention  lower body dressing;toileting;upper body dressing  -JJ (r) CE (t) JJ (c)     Row Name 06/14/21 1334          Lower Body Dressing Assessment/Training    [place] Level (Lower Body Dressing)  don;pants/bottoms;maximum  assist (25% patient effort)  -JJ (r) CE (t) JJ (c)     Position (Lower Body Dressing)  edge of bed sitting  -JJ (r) CE (t) JJ (c)     Row Name 06/14/21 1334          Toileting Assessment/Training    [place] Level (Toileting)  toileting skills;contact guard assist  -JJ (r) CE (t) JJ (c)     Assistive Devices (Toileting)  commode  -JJ (r) CE (t) JJ (c)     Position (Toileting)  unsupported standing  -JJ (r) CE (t) JJ (c)     Row Name 06/14/21 1334          Upper Body Dressing Assessment/Training    [place] Level (Upper Body Dressing)  don;doff;front opening garment;standby assist;verbal cues;nonverbal cues (demo/gesture)  -JJ (r) CE (t) JJ (c)     Position (Upper Body Dressing)  edge of bed sitting  -JJ (r) CE (t) JJ (c)     Comment (Upper Body Dressing)  LSO  -JJ (r) CE (t) JJ (c)       User Key  (r) = Recorded By, (t) = Taken By, (c) = Cosigned By    Initials Name Provider Type    Cherrie Rincon, OTR/L Occupational Therapist    Kori Rocha, OT Student OT Student        Obj/Interventions     Row Name 06/14/21 1334          Sensory Assessment (Somatosensory)    Sensory Assessment (Somatosensory)  UE sensation intact  -JJ (r) CE (t) JJ (c)     Row Name 06/14/21 1334          Vision Assessment/Intervention    Visual Impairment/Limitations  WFL  -JJ (r) CE (t) JJ (c)     Row Name 06/14/21 1334          Range of Motion Comprehensive    General Range of Motion  bilateral upper extremity ROM WFL  -JJ (r) CE (t) JJ (c)     Row Name 06/14/21 1334          Strength Comprehensive (MMT)    Comment, General Manual Muscle Testing (MMT) Assessment  BUE 5/5, pt reports weakness in RLE  -JJ (r) CE (t) JJ (c)     Row Name 06/14/21 1334          Motor Skills    Motor Skills  functional endurance  -JJ (r) CE (t) JJ (c)     Row Name 06/14/21 1334          Balance    Balance Assessment  sitting static balance;sitting dynamic balance;standing static balance;standing dynamic balance  -JJ (r) CE (t) JJ (c)     Static  Sitting Balance  WFL  -JJ (r) CE (t) JJ (c)     Dynamic Sitting Balance  WFL  -JJ (r) CE (t) JJ (c)     Static Standing Balance  mild impairment  -JJ (r) CE (t) JJ (c)     Dynamic Standing Balance  mild impairment  -JJ (r) CE (t) JJ (c)       User Key  (r) = Recorded By, (t) = Taken By, (c) = Cosigned By    Initials Name Provider Type    Cherrie Rincon, OTR/L Occupational Therapist    Kori Rocha, OT Student OT Student        Goals/Plan     Row Name 06/14/21 9429          Transfer Goal 1 (OT)    Activity/Assistive Device (Transfer Goal 1, OT)  sit-to-stand/stand-to-sit;bed-to-chair/chair-to-bed;toilet  -JJ (r) CE (t) JJ (c)     [place] Level/Cues Needed (Transfer Goal 1, OT)  independent  -JJ (r) CE (t) JJ (c)     Time Frame (Transfer Goal 1, OT)  long term goal (LTG);10 days  -JJ (r) CE (t) JJ (c)     Progress/Outcome (Transfer Goal 1, OT)  goal ongoing  -JJ (r) CE (t) JJ (c)     Row Name 06/14/21 7077          Dressing Goal 1 (OT)    Activity/Device (Dressing Goal 1, OT)  lower body dressing  -JJ (r) CE (t) JJ (c)     [place]/Cues Needed (Dressing Goal 1, OT)  minimum assist (75% or more patient effort)  -JJ (r) CE (t) JJ (c)     Time Frame (Dressing Goal 1, OT)  long term goal (LTG);10 days  -JJ (r) CE (t) JJ (c)     Progress/Outcome (Dressing Goal 1, OT)  goal ongoing  -JJ (r) CE (t) JJ (c)     Row Name 06/14/21 133          Toileting Goal 1 (OT)    Activity/Device (Toileting Goal 1, OT)  toileting skills, all  -JJ (r) CE (t) JJ (c)     [place] Level/Cues Needed (Toileting Goal 1, OT)  set-up required  -JJ (r) CE (t) MOHSEN (c)     Time Frame (Toileting Goal 1, OT)  long term goal (LTG);10 days  -MOHSEN (r) SHEILA (t) MOHSEN (c)     Progress/Outcome (Toileting Goal 1, OT)  goal ongoing  -MOHSEN (r) SHEILA (t) MOHSEN (c)     Row Name 06/14/21 7681          Therapy Assessment/Plan (OT)    Planned Therapy Interventions (OT)  activity tolerance training;adaptive equipment training;BADL retraining;functional  balance retraining;IADL retraining;occupation/activity based interventions;patient/caregiver education/training;strengthening exercise;transfer/mobility retraining  -JJ (r) CE (t) JJ (c)       User Key  (r) = Recorded By, (t) = Taken By, (c) = Cosigned By    Initials Name Provider Type    СВЕТЛАНАCherrie Han, OTR/L Occupational Therapist    Kori Rocha, OT Student OT Student        Clinical Impression     Row Name 06/14/21 6096          Pain Assessment    Additional Documentation  Pain Scale: Numbers Pre/Post-Treatment (Group)  -JJ (r) CE (t) JJ (c)     Row Name 06/14/21 5219          Pain Scale: Numbers Pre/Post-Treatment    Pretreatment Pain Rating  6/10  -JJ (r) CE (t) JJ (c)     Posttreatment Pain Rating  8/10  -JJ (r) CE (t) JJ (c)     Pain Location - Side  Right  -JJ (r) CE (t) JJ (c)     Pain Location - Orientation  lower  -JJ (r) CE (t) JJ (c)     Pain Location  back  -JJ (r) CE (t) JJ (c)     Pre/Posttreatment Pain Comment  Pt requested that OT ask nursing for pain medication after session  -JJ (r) CE (t) JJ (c)     Pain Intervention(s)  Medication (See MAR);Repositioned;Ambulation/increased activity  -JJ (r) CE (t) JJ (c)     Row Name 06/14/21 4308          Plan of Care Review    Plan of Care Reviewed With  patient;spouse  -JJ (r) CE (t) JJ (c)     Progress  no change  -JJ (r) CE (t) JJ (c)     Outcome Summary  OT eval completed. Pt alert and oriented x4 with c/o pain at incision site and lower back. Pt was educated on log rolling technique, spinal precautions, and brace management. Pt was max A to don pants and CGA for toileting. Pt donned/doffed LSO with SBA. Pt independently rolled L. Pt t/f from sidelying to sit with SBA. Pt t/f from sit to sidelying with min A to lift R leg onto bed. Pt performed sit <> stand and ambulated to bathroom and around room with CGA. Pt reports feeling weak in RLE. Pt would benefit from skilled OT to address decreased balance and ADL performance. Recommend d/c home  with assist.  -JJ (r) CE (t) JJ (c)     Row Name 06/14/21 1334          Therapy Assessment/Plan (OT)    Patient/Family Therapy Goal Statement (OT)  not stated at this time  -JJ (r) CE (t) JJ (c)     Rehab Potential (OT)  good, to achieve stated therapy goals  -JJ (r) CE (t) JJ (c)     Criteria for Skilled Therapeutic Interventions Met (OT)  yes;skilled treatment is necessary  -JJ (r) CE (t) JJ (c)     Therapy Frequency (OT)  5 times/wk  -JJ (r) CE (t) JJ (c)     Predicted Duration of Therapy Intervention (OT)  until d/c  -JJ (r) CE (t) JJ (c)     Row Name 06/14/21 1334          Therapy Plan Review/Discharge Plan (OT)    Anticipated Discharge Disposition (OT)  home with assist  -JJ (r) CE (t) JJ (c)     Row Name 06/14/21 1334          Positioning and Restraints    Pre-Treatment Position  in bed  -JJ (r) CE (t) JJ (c)     Post Treatment Position  bed  -JJ (r) CE (t) JJ (c)     In Bed  fowlers;call light within reach;encouraged to call for assist;side rails up x2;with family/caregiver;notified nsg;SCD pump applied  -JJ (r) CE (t) JJ (c)       User Key  (r) = Recorded By, (t) = Taken By, (c) = Cosigned By    Initials Name Provider Type    Cherrie Rincon, OTR/L Occupational Therapist    Kori Rocha, OT Student OT Student        Outcome Measures     Row Name 06/14/21 1334          How much help from another is currently needed...    Putting on and taking off regular lower body clothing?  2  -JJ (r) CE (t) JJ (c)     Bathing (including washing, rinsing, and drying)  2  -JJ (r) CE (t) JJ (c)     Toileting (which includes using toilet bed pan or urinal)  3  -JJ (r) CE (t) JJ (c)     Putting on and taking off regular upper body clothing  4  -JJ (r) CE (t) JJ (c)     Taking care of personal grooming (such as brushing teeth)  4  -JJ (r) SHEILA (t) MOHSEN (c)     Eating meals  4  -JJ (r) SHEILA (t) MOHSEN (c)     AM-PAC 6 Clicks Score (OT)  19  -JJ (r) SHEILA (t)     Row Name 06/14/21 8883          How much help from another person do  you currently need...    Turning from your back to your side while in flat bed without using bedrails?  3  (Pended)   -HW     Moving from lying on back to sitting on the side of a flat bed without bedrails?  3  (Pended)   -HW     Moving to and from a bed to a chair (including a wheelchair)?  3  (Pended)   -HW     Standing up from a chair using your arms (e.g., wheelchair, bedside chair)?  3  (Pended)   -HW     Climbing 3-5 steps with a railing?  2  (Pended)   -HW     To walk in hospital room?  3  (Pended)   -HW     AM-PAC 6 Clicks Score (PT)  17  (Pended)   -CK (r) HW (t)     Row Name 06/14/21 1334 06/14/21 1321       Functional Assessment    Outcome Measure Options  AM-PAC 6 Clicks Daily Activity (OT)  -JJ (r) CE (t) JJ (c)  AM-PAC 6 Clicks Basic Mobility (PT)  (Pended)   -HW      User Key  (r) = Recorded By, (t) = Taken By, (c) = Cosigned By    Initials Name Provider Type    Cherrie Rincon, OTR/L Occupational Therapist    CK Kevin Smith, RN Registered Nurse    HW Katherine Beckham, PT Student PT Student    Kori Rocha, OT Student OT Student        Occupational Therapy Education                 Title: PT OT SLP Therapies (In Progress)     Topic: Occupational Therapy (In Progress)     Point: ADL training (Done)     Description:   Instruct learner(s) on proper safety adaptation and remediation techniques during self care or transfers.   Instruct in proper use of assistive devices.              Learning Progress Summary           Patient Acceptance, E,TB, VU by CE at 6/14/2021 6860    Comment: Pt educated on safety with ADL performance, spinal precautions, brace management, and log rolling method.                   Point: Home exercise program (Not Started)     Description:   Instruct learner(s) on appropriate technique for monitoring, assisting and/or progressing therapeutic exercises/activities.              Learner Progress:  Not documented in this visit.          Point: Precautions (Done)      Description:   Instruct learner(s) on prescribed precautions during self-care and functional transfers.              Learning Progress Summary           Patient Acceptance, E,TB, VU by CE at 6/14/2021 7077    Comment: Pt educated on safety with ADL performance, spinal precautions, brace management, and log rolling method.                   Point: Body mechanics (Not Started)     Description:   Instruct learner(s) on proper positioning and spine alignment during self-care, functional mobility activities and/or exercises.              Learner Progress:  Not documented in this visit.                      User Key     Initials Effective Dates Name Provider Type Discipline    CE 05/10/21 -  Kori Rolon, OT Student OT Student OT              OT Recommendation and Plan  Planned Therapy Interventions (OT): activity tolerance training, adaptive equipment training, BADL retraining, functional balance retraining, IADL retraining, occupation/activity based interventions, patient/caregiver education/training, strengthening exercise, transfer/mobility retraining  Therapy Frequency (OT): 5 times/wk  Plan of Care Review  Plan of Care Reviewed With: patient, spouse  Progress: no change  Outcome Summary: OT eval completed. Pt alert and oriented x4 with c/o pain at incision site and lower back. Pt was educated on log rolling technique, spinal precautions, and brace management. Pt was max A to don pants and CGA for toileting. Pt donned/doffed LSO with SBA. Pt independently rolled L. Pt t/f from sidelying to sit with SBA. Pt t/f from sit to sidelying with min A to lift R leg onto bed. Pt performed sit <> stand and ambulated to bathroom and around room with CGA. Pt reports feeling weak in RLE. Pt would benefit from skilled OT to address decreased balance and ADL performance. Recommend d/c home with assist.     Time Calculation:   Time Calculation- OT     Row Name 06/14/21 8929             Time Calculation- OT    OT Start Time  8495   -MOHSEN (r) CE (t) MOHSEN (c)      OT Stop Time  1416  -MOHSEN (r) CE (t) MOHSEN (c)      OT Time Calculation (min)  42 min  -MOHSEN (r) SHEILA (t)      OT Received On  06/14/21  -MOHSEN (r) SHEILA (t) MOHSEN (c)      OT Goal Re-Cert Due Date  06/24/21  -MOHSEN (r) SHEILA (t) MOHSEN (c)        User Key  (r) = Recorded By, (t) = Taken By, (c) = Cosigned By    Initials Name Provider Type    Cherrie Rincon, OTR/L Occupational Therapist    Kori Rocha, OT Student OT Student                 KORI DEL RIO, OT Student  6/14/2021

## 2021-06-14 NOTE — PLAN OF CARE
Goal Outcome Evaluation:  Plan of Care Reviewed With: patient, spouse        Progress: no change  Outcome Summary: PT Eval completed. Pt fowlers in bed, AAOx4 with wife present. Pt c/o of 6/10 pain to R LB and flank near incision. Pt edu on log rolling, spinal precautions, and donning brace with all activity. Pt demos L rolling Liya w/ bed rails, sidelying<>sit req SBAx1 and vc to maintain spinal precautions. Pt effectively donned brace at EOB with min assist. Pt performed sit<>stand and amb 20 ft in room w/ CGAx1. Mild instability during amb as pt would grab onto IV poll and door frames. Recommend amb with RW for future treatments.. Pt amb to chair, but was unable to get comfortable. Assisted back to bed. Pt demos sidelying<>sit with MinAx1. C/o 8/10 pain post eval. Pt returned to fowlers in bed with call light in reach and spouse present. Nsg notified of pt request for pain meds. PT indicated to improve strength, balance, safety with amb and activity tolerance. D/c recommend home with assist.

## 2021-06-14 NOTE — PLAN OF CARE
Goal Outcome Evaluation:  Plan of Care Reviewed With: patient, spouse        Progress: no change  Outcome Summary: OT eval completed. Pt alert and oriented x4 with c/o pain at incision site and lower back. Pt was educated on log rolling technique, spinal precautions, and brace management. Pt was max A to don pants and CGA for toileting. Pt donned/doffed LSO with SBA. Pt independently rolled L. Pt t/f from sidelying to sit with SBA. Pt t/f from sit to sidelying with min A to lift R leg onto bed. Pt performed sit <> stand and ambulated to bathroom and around room with CGA. Pt reports feeling weak in RLE. Pt would benefit from skilled OT to address decreased balance and ADL performance. Recommend d/c home with assist.

## 2021-06-15 LAB
ANION GAP SERPL CALCULATED.3IONS-SCNC: 7 MMOL/L (ref 5–15)
BASOPHILS # BLD AUTO: 0.04 10*3/MM3 (ref 0–0.2)
BASOPHILS NFR BLD AUTO: 0.5 % (ref 0–1.5)
BUN SERPL-MCNC: 11 MG/DL (ref 6–20)
BUN/CREAT SERPL: 13.1 (ref 7–25)
CALCIUM SPEC-SCNC: 8.3 MG/DL (ref 8.6–10.5)
CHLORIDE SERPL-SCNC: 104 MMOL/L (ref 98–107)
CO2 SERPL-SCNC: 27 MMOL/L (ref 22–29)
CREAT SERPL-MCNC: 0.84 MG/DL (ref 0.76–1.27)
DEPRECATED RDW RBC AUTO: 42.9 FL (ref 37–54)
EOSINOPHIL # BLD AUTO: 0.06 10*3/MM3 (ref 0–0.4)
EOSINOPHIL NFR BLD AUTO: 0.7 % (ref 0.3–6.2)
ERYTHROCYTE [DISTWIDTH] IN BLOOD BY AUTOMATED COUNT: 13.1 % (ref 12.3–15.4)
GFR SERPL CREATININE-BSD FRML MDRD: 99 ML/MIN/1.73
GLUCOSE SERPL-MCNC: 131 MG/DL (ref 65–99)
HCT VFR BLD AUTO: 37.1 % (ref 37.5–51)
HGB BLD-MCNC: 12.2 G/DL (ref 13–17.7)
IMM GRANULOCYTES # BLD AUTO: 0.04 10*3/MM3 (ref 0–0.05)
IMM GRANULOCYTES NFR BLD AUTO: 0.5 % (ref 0–0.5)
LYMPHOCYTES # BLD AUTO: 1.66 10*3/MM3 (ref 0.7–3.1)
LYMPHOCYTES NFR BLD AUTO: 18.8 % (ref 19.6–45.3)
MCH RBC QN AUTO: 29.8 PG (ref 26.6–33)
MCHC RBC AUTO-ENTMCNC: 32.9 G/DL (ref 31.5–35.7)
MCV RBC AUTO: 90.7 FL (ref 79–97)
MONOCYTES # BLD AUTO: 0.69 10*3/MM3 (ref 0.1–0.9)
MONOCYTES NFR BLD AUTO: 7.8 % (ref 5–12)
NEUTROPHILS NFR BLD AUTO: 6.34 10*3/MM3 (ref 1.7–7)
NEUTROPHILS NFR BLD AUTO: 71.7 % (ref 42.7–76)
NRBC BLD AUTO-RTO: 0 /100 WBC (ref 0–0.2)
PLATELET # BLD AUTO: 190 10*3/MM3 (ref 140–450)
PMV BLD AUTO: 9.9 FL (ref 6–12)
POTASSIUM SERPL-SCNC: 4.1 MMOL/L (ref 3.5–5.2)
RBC # BLD AUTO: 4.09 10*6/MM3 (ref 4.14–5.8)
SODIUM SERPL-SCNC: 138 MMOL/L (ref 136–145)
WBC # BLD AUTO: 8.83 10*3/MM3 (ref 3.4–10.8)

## 2021-06-15 PROCEDURE — 85025 COMPLETE CBC W/AUTO DIFF WBC: CPT | Performed by: ORTHOPAEDIC SURGERY

## 2021-06-15 PROCEDURE — 25010000003 HYDROMORPHONE 1 MG/ML SOLUTION: Performed by: ORTHOPAEDIC SURGERY

## 2021-06-15 PROCEDURE — 97535 SELF CARE MNGMENT TRAINING: CPT

## 2021-06-15 PROCEDURE — 97116 GAIT TRAINING THERAPY: CPT

## 2021-06-15 PROCEDURE — 80048 BASIC METABOLIC PNL TOTAL CA: CPT | Performed by: ORTHOPAEDIC SURGERY

## 2021-06-15 RX ORDER — CEFAZOLIN SODIUM IN 0.9 % NACL 3 G/100 ML
3 INTRAVENOUS SOLUTION, PIGGYBACK (ML) INTRAVENOUS ONCE
Status: COMPLETED | OUTPATIENT
Start: 2021-06-16 | End: 2021-06-16

## 2021-06-15 RX ADMIN — HYDROMORPHONE HYDROCHLORIDE 1 MG: 1 INJECTION, SOLUTION INTRAMUSCULAR; INTRAVENOUS; SUBCUTANEOUS at 01:44

## 2021-06-15 RX ADMIN — SODIUM CHLORIDE, PRESERVATIVE FREE 3 ML: 5 INJECTION INTRAVENOUS at 20:02

## 2021-06-15 RX ADMIN — SODIUM CHLORIDE 100 ML/HR: 9 INJECTION, SOLUTION INTRAVENOUS at 06:11

## 2021-06-15 RX ADMIN — POLYETHYLENE GLYCOL 3350 17 G: 17 POWDER, FOR SOLUTION ORAL at 09:25

## 2021-06-15 RX ADMIN — DOCUSATE SODIUM 50 MG AND SENNOSIDES 8.6 MG 1 TABLET: 8.6; 5 TABLET, FILM COATED ORAL at 09:24

## 2021-06-15 RX ADMIN — HYDROMORPHONE HYDROCHLORIDE 1 MG: 1 INJECTION, SOLUTION INTRAMUSCULAR; INTRAVENOUS; SUBCUTANEOUS at 06:03

## 2021-06-15 RX ADMIN — DULOXETINE HYDROCHLORIDE 30 MG: 30 CAPSULE, DELAYED RELEASE ORAL at 09:25

## 2021-06-15 RX ADMIN — HYDROCODONE BITARTRATE AND ACETAMINOPHEN 1 TABLET: 7.5; 325 TABLET ORAL at 20:00

## 2021-06-15 RX ADMIN — GABAPENTIN 600 MG: 300 CAPSULE ORAL at 06:03

## 2021-06-15 RX ADMIN — HYDROCODONE BITARTRATE AND ACETAMINOPHEN 2 TABLET: 7.5; 325 TABLET ORAL at 10:19

## 2021-06-15 RX ADMIN — PANTOPRAZOLE SODIUM 40 MG: 40 TABLET, DELAYED RELEASE ORAL at 06:03

## 2021-06-15 RX ADMIN — LISINOPRIL 5 MG: 5 TABLET ORAL at 09:24

## 2021-06-15 RX ADMIN — METFORMIN HYDROCHLORIDE 500 MG: 500 TABLET ORAL at 09:25

## 2021-06-15 RX ADMIN — DOCUSATE SODIUM 50 MG AND SENNOSIDES 8.6 MG 1 TABLET: 8.6; 5 TABLET, FILM COATED ORAL at 20:01

## 2021-06-15 RX ADMIN — CETIRIZINE HYDROCHLORIDE 10 MG: 10 TABLET, FILM COATED ORAL at 09:25

## 2021-06-15 RX ADMIN — HYDROCODONE BITARTRATE AND ACETAMINOPHEN 2 TABLET: 7.5; 325 TABLET ORAL at 15:25

## 2021-06-15 RX ADMIN — GABAPENTIN 600 MG: 300 CAPSULE ORAL at 21:02

## 2021-06-15 RX ADMIN — SODIUM CHLORIDE, PRESERVATIVE FREE 3 ML: 5 INJECTION INTRAVENOUS at 09:26

## 2021-06-15 RX ADMIN — SODIUM CHLORIDE 100 ML/HR: 9 INJECTION, SOLUTION INTRAVENOUS at 16:12

## 2021-06-15 RX ADMIN — GABAPENTIN 600 MG: 300 CAPSULE ORAL at 13:43

## 2021-06-15 RX ADMIN — ATORVASTATIN CALCIUM 10 MG: 10 TABLET, FILM COATED ORAL at 20:01

## 2021-06-15 NOTE — PLAN OF CARE
Goal Outcome Evaluation:      Patient C/O  aching, burning pain in back, 7-9 this shift. Medicated x 3 with PRN pain medication. Medicated x 1 for headache with PRN Tylenol. IV fluids continue at 100 ml/hr. IV Ancef given this shift. Patient felt warm to touch early this AM with temperature of 99 and 99.3, cool compress to forehead. Also, reports RT. Ear  hurting similar to earache per patient description.NC at 1 liter placed due to saturation of 90 %. Ambulated to bathroom with assist of one person and brace to back.

## 2021-06-15 NOTE — PROGRESS NOTES
Orthopedic Spine Progress Note    Jagdish Lady  45 y.o.  male  Subjective     Post-Operative Day # 1    Systemic or Specific Complaints:     Complains of headache and constipation.  Has recently seen constipation medications without relief thus far.  Otherwise pain well controlled with medication.  Ambulating well to bathroom unassisted.  Will work with physical therapy today and plan for second stage tomorrow.    Objective     Vital signs in last 24 hours:  Temp:  [97.2 °F (36.2 °C)-99.3 °F (37.4 °C)] 99.3 °F (37.4 °C)  Heart Rate:  [67-97] 80  Resp:  [12-20] 18  BP: ()/() 90/61    Physical Exam:    Alert and oriented ×3, no acute distress, grossly neurovascularly intact, vital signs stable, dressing clean dry and intact, moving all extremities without focal deficit      Diagnostic Data:  CBC:  Results from last 7 days   Lab Units 06/15/21  0622   WBC 10*3/mm3 8.83   RBC 10*6/mm3 4.09*   HEMOGLOBIN g/dL 12.2*   HEMATOCRIT % 37.1*   PLATELETS 10*3/mm3 190          Assessment/Plan     1. Status post left LLIF L4-5, Dr. Mitul Schafer, 05/16/2017.  2. Status post TLIF L5-S1, PSF with instrumentation L4 to S1, Dr. Mitul Schafer, 06/01/2017.  3. Recurrent low back pain.  4. Recurrent bilateral buttock, thigh and leg radiculopathy, left worse than right.  5. Neurogenic claudication.  6. Adjacent level degenerative disk disease, L3-4.  7. Facet arthropathy, L3-4.  8. Foraminal disk herniation, left L3-4.  9. Congenital short pedicle syndrome.  10. Central and bilateral foraminal stenosis, left worse than right L3-4.  11.  Status post right LLIF with instrumentation L3-4, 6/14/2021     Plan:  1. Plan second stage surgery tomorrow  2. Npo after midnight  3. periops / PT/OT/Brace today        Herb Christianson PA-C    Date: 6/15/2021  Time: 07:30 CDT

## 2021-06-15 NOTE — PAYOR COMM NOTE
"FROM: YEYO LARSON  PHONE: [phone]  FAX: [phone]    AUTH: [id number]    Uday Hernandez (45 y.o. Male)     Date of Birth Social Security Number Address Home Phone MRN    1975  [address] [facility] [address] [phone] [id number]    Taoist Marital Status          Advent        Admission Date Admission Type Admitting Provider Attending Provider Department, Room/Bed    6/14/21 Elective EDIE Castro MD Strenge, K Brandon, MD [facility] 3A, 337/1    Discharge Date Discharge Disposition Discharge Destination                       Attending Provider: EDIE Castro MD    Allergies: Other, Oxycontin [Oxycodone]    Isolation: None   Infection: COVID Screen (preop/placement) (06/07/21)   Code Status: CPR    Ht: 174 cm (68.5\")   Wt: 111 kg (245 lb)    Admission Cmt: None   Principal Problem: None                Active Insurance as of 6/14/2021     Primary Coverage     Payor Plan Insurance Group Employer/Plan Group    [employer] [place] [employer] [place]      Payor Plan Address Payor Plan Phone Number Payor Plan Fax Number Effective Dates    [address] [address] [phone]  4/12/2019 - None Entered    [facility] [address]       Subscriber Name Subscriber Birth Date Member ID       UDAY HERNANDEZ 1975 [id number]                 Emergency Contacts      (Rel.) Home Phone Work Phone Mobile Phone    link hernandez (Spouse) -- -- [phone]               Operative/Procedure Notes (last 48 hours) (Notes from 06/13/21 0832 through 06/15/21 0832)      EDIE Castro MD at 06/14/21 0540        LUMBAR LATERAL INTERBODY FUSION  Procedure Note    Uday Hernandez  6/14/2021    Pre-op Diagnosis:     1. Status post left LLIF L4-5, Dr. Mitul Schafer, 05/16/2017.  2. Status post TLIF L5-S1, PSF with instrumentation L4 to S1, Dr. Mitul Schafer, 06/01/2017.  3. Recurrent low back pain.  4. Recurrent bilateral buttock, thigh and leg radiculopathy, left worse than right.  5. Neurogenic " claudication.  6. Adjacent level degenerative disk disease, L3-4.  7. Facet arthropathy, L3-4.  8. Foraminal disk herniation, left L3-4.  9. Congenital short pedicle syndrome.  10. Central and bilateral foraminal stenosis, left worse than right L3-4.    Post-op Diagnosis:    same    Procedure/CPT® Codes:    1.  Right lateral lumbar interbody fusion L3-4  2.  Anterior spinal instrumentation L3-4 (Nu[employer] lateral plate and screws)  3.  Use of PEEK interbody biomechanical device for fusion L3-4 ([employer] porous PEEK spacer)  4.  Use of allograft bone matrix for fusion  5.  Use of fluoroscopy for confirmation of surgical level, placement of PEEK spacer and instrumentation  6.  Intraoperative neural monitoring    Anesthesia: General    Surgeon: INA Castro MD    Assistant: Conrad Burkett PA-C, Sid Faust PA-C    Estimated Blood Loss: Minimal    Complications: None    Condition: Stable to PACU.    Indications:    The patient is a 45-year-old who sees Dr. Avtar Guerrero for medical issues.  He presented to the office with a history of a prior staged fusion procedure from L4-S1 performed by my partner Dr. Mitul Schafer in 2017.  Unfortunately, he presented with recurrent low back pain as well as a recurrence of symptoms down his lower extremities, with the left side worse than the right.  His symptoms were consistent with neurogenic claudication.  Imaging studies revealed adjacent level degenerative disc disease and facet arthropathy at L3-4 along with a foraminal disc herniation and congenital short pedicle syndrome causing central and bilateral foraminal stenosis that was worse on the left than the right and matched his symptoms.    After failing conservative measures, it was mutually decided that surgery would be the best option. Risks, benefits, and complications of surgery were discussed with the patient. The patient appeared well informed and wished to proceed. We specifically discussed the risk of infection,  blood loss, nerve root injury, CSF leak, and the possibility of incomplete resolution of symptoms. We also discussed the possible risk of a nonunion and the potential need for additional surgery in the event of a pseudoarthrosis or hardware failure.     We elected proceed with a staged operation.  It is planned we will be proceeding with a second posterior procedure at a later date.    Operative Procedure:    After obtaining informed consent and verifying the correct operative site, the patient was brought to the operating room and placed supine on operating table.  A general anesthetic was provided by the anesthesia service with the assistance of an endotracheal tube.  Once this was appropriately positioned and secured, the patient was carefully rotated into the lateral decubitus position with the right side up. All bony prominences were well-padded.  3 inch wide cloth tape was used to maintain the patient's position.  It was also used to tip open the pelvis slightly allowing better access to the lumbar spine through a lateral approach.  Fluoroscopy was then used to identify the L3-4 level, and the skin was marked for our planned incision.  The right flank was then prepped and draped in the usual sterile fashion.  A surgical timeout was taken to confirm this was the correct patient, we were working at the correct level, and that preoperative antibiotics were given in a timely fashion.    An oblique incision was created of the right flank using a 10 blade scalpel directly centered over the L3-4 segment. Dissection was carried bluntly through subcutaneous tissues. Blunt dissection was also carried between the external oblique and internal oblique musculature. The transversalis fascia was pierced allowing access to the retroperitoneal space. At this point a series of neuromonitoring probes were used to safely access the L3-4 level. We used stimulated and free run EMG for this purpose. Once nerve roots were confirmed  to be out of harm's way, self retaining retractors were placed for continuous exposure.     An annulotomy was then created at the L3-4 area using a 15 blade scalpel on a long handle. A Ma elevator was used to remove disc material off of the endplates. Disc material was removed using Kerrisons, pituitaries, and forward angled curettes. Once all disc material had been retrieved, I used the Ma elevator to divide the contralateral annulus. This assisted with mobilization of the vertebral body. We then used a series of endplate scrapers to prepare the endplates for interbody fusion. Trial spacers were malleted into position and for the disc space it was felt that a 12 mm x 55 mm implant would be the best fit to restore disc height. The disc space was then thoroughly irrigated with saline solution.     A porous PEEK spacer from the [employer] instrumentation set measuring 12 mm x 55 mm x 22 mm was then packed with allograft bone matrix as tightly as possible. This PEEK spacer was then malleted into the L3-4 disc space under fluoroscopic guidance. It was placed as a PEEK interbody biomechanical device to assist with interbody fusion. Once this spacer was confirmed to be properly positioned, I chose a 2-hole plate to help augment the fusion of L3-4. This plate was held into position using screws. I placed a 50 mm screw into L3 and I placed a shorter 35 mm screw into L4. The shorter screw was used to hopefully accommodate the placement of a pedicle screw on the contralateral L4 pedicle as part of the next stage of the procedure.     The wound was then irrigated thoroughly. A thorough inspection was then undertaken to ensure that we had adequate hemostasis. Bleeding at this point was controlled using thrombin with Gelfoam powder and bipolar cautery. Closure was then accomplished with a #1 Vicryl reapproximating the internal and external oblique musculature. Immediate subcutaneous cutaneous tissues were closed with a 3-0  Vicryl. And skin closure was accomplished using Mastisol and Steri-Strips. The wound was then sterilely dressed. The patient was then carefully rotated supine onto a hospital gurney, extubated, and sent to the recovery room in good stable condition.     The patient tolerated the procedure well. There were no complications. We estimate blood loss to be minimal. The patient remained hemodynamically stable.     Conrad Burkett PA-C provided critical assistance during the procedure. His assistance was medically necessary in order to allow the procedure to occur in the most safe and efficient manner.    INA Castro MD     Date: 6/14/2021  Time: 09:16 CDT    Electronically signed by EDIE Castro MD at 06/14/21 0916          Physician Progress Notes (last 24 hours) (Notes from 06/14/21 0832 through 06/15/21 0832)      Herb Christianson PA-C at 06/15/21 0730          Orthopedic Spine Progress Note    Jagdish Kulkarni  45 y.o.  male  Subjective     Post-Operative Day # 1    Systemic or Specific Complaints:     Complains of headache and constipation.  Has recently seen constipation medications without relief thus far.  Otherwise pain well controlled with medication.  Ambulating well to bathroom unassisted.  Will work with physical therapy today and plan for second stage tomorrow.    Objective     Vital signs in last 24 hours:  Temp:  [97.2 °F (36.2 °C)-99.3 °F (37.4 °C)] 99.3 °F (37.4 °C)  Heart Rate:  [67-97] 80  Resp:  [12-20] 18  BP: ()/() 90/61    Physical Exam:    Alert and oriented ×3, no acute distress, grossly neurovascularly intact, vital signs stable, dressing clean dry and intact, moving all extremities without focal deficit      Diagnostic Data:  CBC:  Results from last 7 days   Lab Units 06/15/21  0622   WBC 10*3/mm3 8.83   RBC 10*6/mm3 4.09*   HEMOGLOBIN g/dL 12.2*   HEMATOCRIT % 37.1*   PLATELETS 10*3/mm3 190          Assessment/Plan     1. Status post left LLIF L4-5, Dr. Mitul Schafer,  05/16/2017.  2. Status post TLIF L5-S1, PSF with instrumentation L4 to S1, Dr. Mitul Schafer, 06/01/2017.  3. Recurrent low back pain.  4. Recurrent bilateral buttock, thigh and leg radiculopathy, left worse than right.  5. Neurogenic claudication.  6. Adjacent level degenerative disk disease, L3-4.  7. Facet arthropathy, L3-4.  8. Foraminal disk herniation, left L3-4.  9. Congenital short pedicle syndrome.  10. Central and bilateral foraminal stenosis, left worse than right L3-4.  11.  Status post right LLIF with instrumentation L3-4, 6/14/2021     Plan:  1. Plan second stage surgery tomorrow  2. Npo after midnight  3. periops / PT/OT/Brace today        Herb Christianson PA-C    Date: 6/15/2021  Time: 07:30 CDT      Electronically signed by Herb Christianson PA-C at 06/15/21 0731       Consult Notes (last 24 hours) (Notes from 06/14/21 0832 through 06/15/21 0832)    No notes of this type exist for this encounter.

## 2021-06-15 NOTE — THERAPY TREATMENT NOTE
Acute Care - Physical Therapy Treatment Note  [facility]     Patient Name: Jagdish Kulkarni  : 1975  MRN: [id number]  Today's Date: 6/15/2021           PT Assessment (last 12 hours)      PT Evaluation and Treatment     Row Name 06/15/21 1507 06/15/21 1101       Physical Therapy Time and Intention    Subjective Information  complains of;pain headache  -KJ  no complaints  -KJ    Document Type  therapy note (daily note)  -KJ  therapy note (daily note)  -KJ    Mode of Treatment  physical therapy  -KJ  physical therapy  -KJ    Patient Effort  good  -KJ  good  -KJ    Comment  LSO  -KJ  LSO  -KJ    Row Name 06/15/21 1507 06/15/21 1101       General Information    Existing Precautions/Restrictions  brace worn when out of bed;fall;spinal  -KJ  brace worn when out of bed;fall;spinal  -KJ    Row Name 06/15/21 1507 06/15/21 1101       Pain Scale: Numbers Pre/Post-Treatment    Pretreatment Pain Rating  5/10  -KJ  7/10  -KJ    Posttreatment Pain Rating  7/10  -KJ  7/10  -KJ    Pain Location - Side  Right  -KJ  Right  -KJ    Pain Location - Orientation  lower  -KJ  lower  -KJ    Pain Location  abdomen  -KJ  back  -KJ    Row Name 06/15/21 1507          Pain Scale: Word Pre/Post-Treatment    Pain: Word Scale, Pretreatment  4 - moderate pain  -KJ     Posttreatment Pain Rating  4 - moderate pain  -KJ     Pain Location  head  -KJ     Row Name 06/15/21 1507 06/15/21 1101       Bed Mobility    Sidelying-Sit [place] (Bed Mobility)  independent  -KJ  independent  -KJ    Sit-Sidelying [place] (Bed Mobility)  independent  -KJ  independent  -KJ    Row Name 06/15/21 1507 06/15/21 1101       Transfers    Sit-Stand [place] (Transfers)  supervision  -KJ  supervision  -KJ    Row Name 06/15/21 1507 06/15/21 1101       Gait/Stairs (Locomotion)    [place] Level (Gait)  supervision  -KJ  supervision  -KJ    Distance in Feet (Gait)  100' x 2  -KJ  65' x 2  -KJ    Deviations/Abnormal Patterns (Gait)  gait speed decreased  -KJ   gait speed decreased  -KJ    Row Name             Wound 06/14/21 0739 Right flank Incision    Wound - Properties Group Placement Date: 06/14/21  -KM Placement Time: 0739  -KM Side: Right  -KM Location: flank  -KM Primary Wound Type: Incision  -KM    Retired Wound - Properties Group Date first assessed: 06/14/21  -KM Time first assessed: 0739  -KM Side: Right  -KM Location: flank  -KM Primary Wound Type: Incision  -KM    Row Name 06/15/21 1507 06/15/21 1101       Positioning and Restraints    Pre-Treatment Position  in bed  -KJ  in bed  -KJ    Post Treatment Position  bed  -KJ  bed  -KJ    In Bed  sitting EOB;call light within reach  -KJ  call light within reach  -KJ      User Key  (r) = Recorded By, (t) = Taken By, (c) = Cosigned By    Initials Name Provider Type    Irene Muller, PTA Physical Therapy Assistant    Maci Sandy RN Registered Nurse        Physical Therapy Education                 Title: PT OT SLP Therapies (In Progress)     Topic: Physical Therapy (Done)     Point: Mobility training (Done)     Learning Progress Summary           Patient Acceptance, E,TB, VU,DU by  at 6/14/2021 1430    Comment: Pt educated on spinal precautions including log rolling for bed mobility and proper donning/doffing of brace before OOB activity.   Family Acceptance, E,TB, VU,DU by  at 6/14/2021 1430    Comment: Pt educated on spinal precautions including log rolling for bed mobility and proper donning/doffing of brace before OOB activity.                   Point: Body mechanics (Done)     Learning Progress Summary           Patient Acceptance, E,TB, VU,DU by  at 6/14/2021 1430    Comment: Pt educated on spinal precautions including log rolling for bed mobility and proper donning/doffing of brace before OOB activity.   Family Acceptance, E,TB, VU,DU by  at 6/14/2021 1430    Comment: Pt educated on spinal precautions including log rolling for bed mobility and proper donning/doffing of brace before OOB  activity.                   Point: Precautions (Done)     Learning Progress Summary           Patient Acceptance, E,TB, VU,DU by  at 6/14/2021 1430    Comment: Pt educated on spinal precautions including log rolling for bed mobility and proper donning/doffing of brace before OOB activity.   Family Acceptance, E,TB, VU,DU by  at 6/14/2021 1430    Comment: Pt educated on spinal precautions including log rolling for bed mobility and proper donning/doffing of brace before OOB activity.                               User Key     Initials Effective Dates Name Provider Type [facility] 04/22/21 -  Katherine Beckham, PT Student PT Student PT              PT Recommendation and Plan     Plan of Care Reviewed With: patient  Progress: improving  Outcome Summary: PT tx completed. Pt supine in bed, rates back pn 7/10. I bed mobility, transfers S/I, amb 65'x 2 with r wx S. Mobilizing well.       Time Calculation:   PT Charges     Row Name 06/15/21 1522 06/15/21 1117          Time Calculation    Start Time  1507  -KJ  1101  -KJ     Stop Time  1522  -KJ  1117  -KJ     Time Calculation (min)  15 min  -KJ  16 min  -KJ     PT Received On  --  06/15/21  -KJ     PT Goal Re-Cert Due Date  --  06/24/21  -KJ        Time Calculation- PT    Total Timed Code Minutes- PT  15 minute(s)  -KJ  16 minute(s)  -KJ       User Key  (r) = Recorded By, (t) = Taken By, (c) = Cosigned By    Initials Name Provider Type     Irene Betancourt PTA Physical Therapy Assistant        Therapy Charges for Today     Code Description Service Date Service Provider Modifiers Qty    41213564046 HC GAIT TRAINING EA 15 MIN 6/15/2021 Irene Betancourt PTA GP 1    92638139640 HC GAIT TRAINING EA 15 MIN 6/15/2021 Irene Betancourt PTA GP 1          PT G-Codes  Outcome Measure Options: AM-PAC 6 Clicks Daily Activity (OT)  AM-PAC 6 Clicks Score (PT): 17  AM-PAC 6 Clicks Score (OT): 19    Irene Betancourt PTA  6/15/2021

## 2021-06-15 NOTE — PLAN OF CARE
Goal Outcome Evaluation:  Plan of Care Reviewed With: patient        Progress: improving  Outcome Summary: PT tx completed. Pt supine in bed, rates back pn 7/10. I bed mobility, transfers S/I, amb 65'x 2 with r wx S. Mobilizing well.

## 2021-06-15 NOTE — THERAPY TREATMENT NOTE
Acute Care - Physical Therapy Treatment Note  [facility]     Patient Name: Jagdish Kulkarni  : 1975  MRN: [id number]  Today's Date: 6/15/2021           PT Assessment (last 12 hours)      PT Evaluation and Treatment     Row Name 06/15/21 1101          Physical Therapy Time and Intention    Subjective Information  no complaints  -KJ     Document Type  therapy note (daily note)  -KJ     Mode of Treatment  physical therapy  -KJ     Patient Effort  good  -KJ     Comment  LSO  -KJ     Row Name 06/15/21 1101          General Information    Existing Precautions/Restrictions  brace worn when out of bed;fall;spinal  -KJ     Row Name 06/15/21 1101          Pain Scale: Numbers Pre/Post-Treatment    Pretreatment Pain Rating  7/10  -KJ     Posttreatment Pain Rating  7/10  -KJ     Pain Location - Side  Right  -KJ     Pain Location - Orientation  lower  -KJ     Pain Location  back  -KJ     Row Name 06/15/21 1101          Bed Mobility    Sidelying-Sit [place] (Bed Mobility)  independent  -KJ     Sit-Sidelying [place] (Bed Mobility)  independent  -KJ     Row Name 06/15/21 1101          Transfers    Sit-Stand [place] (Transfers)  supervision  -KJ     Row Name 06/15/21 1101          Gait/Stairs (Locomotion)    [place] Level (Gait)  supervision  -KJ     Distance in Feet (Gait)  65' x 2  -KJ     Deviations/Abnormal Patterns (Gait)  gait speed decreased  -KJ     Row Name             Wound 21 0739 Right flank Incision    Wound - Properties Group Placement Date: 21  -KM Placement Time: 0739  -KM Side: Right  -KM Location: flank  -KM Primary Wound Type: Incision  -KM    Retired Wound - Properties Group Date first assessed: 21  -KM Time first assessed: 0739  -KM Side: Right  -KM Location: flank  -KM Primary Wound Type: Incision  -KM    Row Name 06/15/21 1101          Positioning and Restraints    Pre-Treatment Position  in bed  -KJ     Post Treatment Position  bed  -KJ     In Bed  call light within  reach  -       User Key  (r) = Recorded By, (t) = Taken By, (c) = Cosigned By    Initials Name Provider Type    Irene Muller, PTA Physical Therapy Assistant    Maci Sandy, RN Registered Nurse        Physical Therapy Education                 Title: PT OT SLP Therapies (In Progress)     Topic: Physical Therapy (Done)     Point: Mobility training (Done)     Learning Progress Summary           Patient Acceptance, E,TB, VU,DU by  at 6/14/2021 1430    Comment: Pt educated on spinal precautions including log rolling for bed mobility and proper donning/doffing of brace before OOB activity.   Family Acceptance, E,TB, VU,DU by  at 6/14/2021 1430    Comment: Pt educated on spinal precautions including log rolling for bed mobility and proper donning/doffing of brace before OOB activity.                   Point: Body mechanics (Done)     Learning Progress Summary           Patient Acceptance, E,TB, VU,DU by  at 6/14/2021 1430    Comment: Pt educated on spinal precautions including log rolling for bed mobility and proper donning/doffing of brace before OOB activity.   Family Acceptance, E,TB, VU,DU by  at 6/14/2021 1430    Comment: Pt educated on spinal precautions including log rolling for bed mobility and proper donning/doffing of brace before OOB activity.                   Point: Precautions (Done)     Learning Progress Summary           Patient Acceptance, E,TB, VU,DU by  at 6/14/2021 1430    Comment: Pt educated on spinal precautions including log rolling for bed mobility and proper donning/doffing of brace before OOB activity.   Family Acceptance, E,TB, VU,DU by  at 6/14/2021 1430    Comment: Pt educated on spinal precautions including log rolling for bed mobility and proper donning/doffing of brace before OOB activity.                               User Key     Initials Effective Dates Name Provider Type Discipline     04/22/21 -  Katherine Beckham, PT Student PT Student PT              PT  Recommendation and Plan     Plan of Care Reviewed With: patient  Progress: improving  Outcome Summary: PT tx completed. Pt supine in bed, rates back pn 7/10. I bed mobility, transfers S/I, amb 65'x 2 with r wx S. Mobilizing well.       Time Calculation:   PT Charges     Row Name 06/15/21 1117             Time Calculation    Start Time  1101  -KJ      Stop Time  1117  -KJ      Time Calculation (min)  16 min  -KJ      PT Received On  06/15/21  -KJ      PT Goal Re-Cert Due Date  06/24/21  -KJ         Time Calculation- PT    Total Timed Code Minutes- PT  16 minute(s)  -KJ        User Key  (r) = Recorded By, (t) = Taken By, (c) = Cosigned By    Initials Name Provider Type    Irene Muller, MICHELLE Physical Therapy Assistant        Therapy Charges for Today     Code Description Service Date Service Provider Modifiers Qty    42741121350 HC GAIT TRAINING EA 15 MIN 6/15/2021 Irene Betancourt PTA GP 1          PT G-Codes  Outcome Measure Options: AM-PAC 6 Clicks Daily Activity (OT)  AM-PAC 6 Clicks Score (PT): 17  AM-PAC 6 Clicks Score (OT): 19    Irene Betancourt PTA  6/15/2021

## 2021-06-15 NOTE — PLAN OF CARE
Goal Outcome Evaluation:           Progress: improving  Outcome Summary: Pt A&Ox4, no neuro changes. Pt c/o numbness to incision site. Pt denies numbness/tingling currently. Dressing to R side of back CDI. , room air. Up x 1 with LSO brace. Pt turns self in bed. Coccyx red/blanchable. Voiding per urinal. One small BM this AM per pt. Safety maintained.

## 2021-06-15 NOTE — THERAPY TREATMENT NOTE
Acute Care - Occupational Therapy Treatment Note   [place]     Patient Name: Jagdish Kulkarni  : 1975  MRN: [id number]  Today's Date: 6/15/2021             Admit Date: 2021       ICD-10-CM ICD-9-CM   1. Decreased activities of daily living (ADL)  Z78.9 V49.89   2. Impaired mobility  Z74.09 799.89     Patient Active Problem List   Diagnosis   • Essential hypertension   • Mixed hyperlipidemia   • Obesity   • Hyperglycemia   • DDD (degenerative disc disease), lumbar   • Chronic bilateral low back pain without sciatica   • Lumbar pain     Past Medical History:   Diagnosis Date   • Chronic bilateral low back pain without sciatica 2021   • Diabetes mellitus (CMS/HCC)     border line   • Elevated cholesterol    • GERD (gastroesophageal reflux disease)    • Hyperlipidemia    • Hypertension    • PONV (postoperative nausea and vomiting)      Past Surgical History:   Procedure Laterality Date   • CHOLECYSTECTOMY     • LUMBAR FUSION Right 2021    Procedure: RIGHT LATERAL LUMBAR INTERBODY FUSION WITH INSTRUMENTATION L3-4;  Surgeon: EDIE Castro MD;  Location: [facility];  Service: Orthopedic Spine;  Laterality: Right;   • LUMBAR SPINE SURGERY              OT ASSESSMENT FLOWSHEET (last 12 hours)      OT Evaluation and Treatment     Row Name 06/15/21 1132                   OT Time and Intention    Subjective Information  complains of;pain  -MW        Document Type  therapy note (daily note)  -MW        Mode of Treatment  occupational therapy  -MW           General Information    Existing Precautions/Restrictions  brace worn when out of bed;fall;spinal  -MW           Pain Assessment    Additional Documentation  Pain Scale: Word Pre/Post-Treatment (Group)  -MW           Pain Scale: Word Pre/Post-Treatment    Pain: Word Scale, Pretreatment  4 - moderate pain  -MW        Posttreatment Pain Rating  4 - moderate pain  -MW        Pain Location  head  -MW           Activities of Daily Living    BADL  Assessment/Intervention  toileting  -           Toileting Assessment/Training    Dyke Level (Toileting)  toileting skills;contact guard assist  -MW        Comment (Toileting)  eduated on modifications and AE for increased independence within spinal precautions  -MW           Wound 06/14/21 0739 Right flank Incision    Wound - Properties Group Placement Date: 06/14/21  -KM Placement Time: 0739 -KM Side: Right  -KM Location: flank  -KM Primary Wound Type: Incision  -KM    Retired Wound - Properties Group Date first assessed: 06/14/21 -KM Time first assessed: 0739 -KM Side: Right  -KM Location: flank  -KM Primary Wound Type: Incision  -KM       Plan of Care Review    Plan of Care Reviewed With  patient  -MW        Progress  improving  -        Outcome Summary  Pt c/o headache and wanting to rest but does state having questions about specific ADL within spinal precautions. Training and eduation provided to increase compliance and independence with ADL. Deferred any mobility or OOB training this date. WIll follow tomorrow post op.   -MW           Positioning and Restraints    Pre-Treatment Position  in bed  -MW        Post Treatment Position  bed  -MW        In Bed  fowlers;call light within reach;encouraged to call for assist;with family/caregiver  -           Therapy Plan Review/Discharge Plan (OT)    Anticipated Discharge Disposition (OT)  home with assist  -MW          User Key  (r) = Recorded By, (t) = Taken By, (c) = Cosigned By    Initials Name Effective Dates    KM Maci Caro, RN 01/13/20 -     MW Raisa Rodriguez, OTR/L 08/28/18 -          Occupational Therapy Education                 Title: PT OT SLP Therapies (In Progress)     Topic: Occupational Therapy (In Progress)     Point: ADL training (Done)     Description:   Instruct learner(s) on proper safety adaptation and remediation techniques during self care or transfers.   Instruct in proper use of assistive devices.              Learning  Progress Summary           Patient Acceptance, E,TB, VU by CE at 6/14/2021 1443    Comment: Pt educated on safety with ADL performance, spinal precautions, brace management, and log rolling method.                   Point: Home exercise program (Not Started)     Description:   Instruct learner(s) on appropriate technique for monitoring, assisting and/or progressing therapeutic exercises/activities.              Learner Progress:  Not documented in this visit.          Point: Precautions (Done)     Description:   Instruct learner(s) on prescribed precautions during self-care and functional transfers.              Learning Progress Summary           Patient Acceptance, E,TB, VU by CE at 6/14/2021 1443    Comment: Pt educated on safety with ADL performance, spinal precautions, brace management, and log rolling method.                   Point: Body mechanics (Not Started)     Description:   Instruct learner(s) on proper positioning and spine alignment during self-care, functional mobility activities and/or exercises.              Learner Progress:  Not documented in this visit.                      User Key     Initials Effective Dates Name Provider Type Discipline     05/10/21 -  Kori Rolon OT Student OT Student OT                  OT Recommendation and Plan     Plan of Care Review  Plan of Care Reviewed With: patient  Progress: improving  Outcome Summary: Pt c/o headache and wanting to rest but does state having questions about specific ADL within spinal precautions. Training and eduation provided to increase compliance and independence with ADL. Deferred any mobility or OOB training this date. WIll follow tomorrow post op.   Plan of Care Reviewed With: patient  Outcome Summary: Pt c/o headache and wanting to rest but does state having questions about specific ADL within spinal precautions. Training and eduation provided to increase compliance and independence with ADL. Deferred any mobility or OOB training  this date. WIll follow tomorrow post op.         Time Calculation:   Time Calculation- OT     Row Name 06/15/21 1300             Time Calculation- OT    OT Start Time  1132  -MW      OT Stop Time  1145  -MW      OT Time Calculation (min)  13 min  -MW      Total Timed Code Minutes- OT  13 minute(s)  -MW      OT Received On  06/15/21  -        User Key  (r) = Recorded By, (t) = Taken By, (c) = Cosigned By    Initials Name Provider Type     Raisa Rodriguez, OTR/L Occupational Therapist        Therapy Charges for Today     Code Description Service Date Service Provider Modifiers Qty    88283669621 HC OT SELF CARE/MGMT/TRAIN EA 15 MIN 6/15/2021 Raisa Rodriguez OTR/L GO 1               SINDHU Pedro/JUDY  6/15/2021

## 2021-06-16 ENCOUNTER — ANESTHESIA EVENT (OUTPATIENT)
Dept: PERIOP | Facility: HOSPITAL | Age: 46
End: 2021-06-16

## 2021-06-16 ENCOUNTER — ANESTHESIA (OUTPATIENT)
Dept: PERIOP | Facility: HOSPITAL | Age: 46
End: 2021-06-16

## 2021-06-16 ENCOUNTER — APPOINTMENT (OUTPATIENT)
Dept: GENERAL RADIOLOGY | Facility: HOSPITAL | Age: 46
End: 2021-06-16

## 2021-06-16 LAB
GLUCOSE BLDC GLUCOMTR-MCNC: 114 MG/DL (ref 70–130)
GLUCOSE BLDC GLUCOMTR-MCNC: 117 MG/DL (ref 70–130)
SARS-COV-2 RNA PNL SPEC NAA+PROBE: NOT DETECTED

## 2021-06-16 PROCEDURE — 25010000003 HYDROMORPHONE 1 MG/ML SOLUTION: Performed by: ORTHOPAEDIC SURGERY

## 2021-06-16 PROCEDURE — 25010000002 CEFAZOLIN 1-4 GM/50ML-% SOLUTION: Performed by: PHYSICIAN ASSISTANT

## 2021-06-16 PROCEDURE — 25010000002 PROPOFOL 10 MG/ML EMULSION: Performed by: NURSE ANESTHETIST, CERTIFIED REGISTERED

## 2021-06-16 PROCEDURE — 0SP30AZ REMOVAL OF INTERBODY FUSION DEVICE FROM LUMBOSACRAL JOINT, OPEN APPROACH: ICD-10-PCS | Performed by: ORTHOPAEDIC SURGERY

## 2021-06-16 PROCEDURE — 25010000002 HYDROMORPHONE PER 4 MG: Performed by: ANESTHESIOLOGY

## 2021-06-16 PROCEDURE — 25010000002 FENTANYL CITRATE (PF) 50 MCG/ML SOLUTION: Performed by: ANESTHESIOLOGY

## 2021-06-16 PROCEDURE — C9290 INJ, BUPIVACAINE LIPOSOME: HCPCS | Performed by: ORTHOPAEDIC SURGERY

## 2021-06-16 PROCEDURE — C1713 ANCHOR/SCREW BN/BN,TIS/BN: HCPCS | Performed by: ORTHOPAEDIC SURGERY

## 2021-06-16 PROCEDURE — 87635 SARS-COV-2 COVID-19 AMP PRB: CPT | Performed by: ORTHOPAEDIC SURGERY

## 2021-06-16 PROCEDURE — 72100 X-RAY EXAM L-S SPINE 2/3 VWS: CPT

## 2021-06-16 PROCEDURE — 25010000002 ONDANSETRON PER 1 MG: Performed by: NURSE ANESTHETIST, CERTIFIED REGISTERED

## 2021-06-16 PROCEDURE — 0SG3071 FUSION OF LUMBOSACRAL JOINT WITH AUTOLOGOUS TISSUE SUBSTITUTE, POSTERIOR APPROACH, POSTERIOR COLUMN, OPEN APPROACH: ICD-10-PCS | Performed by: ORTHOPAEDIC SURGERY

## 2021-06-16 PROCEDURE — C1889 IMPLANT/INSERT DEVICE, NOC: HCPCS | Performed by: ORTHOPAEDIC SURGERY

## 2021-06-16 PROCEDURE — 25010000002 PHENYLEPHRINE HCL 0.8 MG/10ML SOLUTION PREFILLED SYRINGE: Performed by: NURSE ANESTHETIST, CERTIFIED REGISTERED

## 2021-06-16 PROCEDURE — 82962 GLUCOSE BLOOD TEST: CPT

## 2021-06-16 PROCEDURE — C1769 GUIDE WIRE: HCPCS | Performed by: ORTHOPAEDIC SURGERY

## 2021-06-16 PROCEDURE — 25010000002 DEXAMETHASONE PER 1 MG: Performed by: NURSE ANESTHETIST, CERTIFIED REGISTERED

## 2021-06-16 PROCEDURE — 25010000003 BUPIVACAINE LIPOSOME 1.3 % SUSPENSION 20 ML VIAL: Performed by: ORTHOPAEDIC SURGERY

## 2021-06-16 PROCEDURE — 76000 FLUOROSCOPY <1 HR PHYS/QHP: CPT

## 2021-06-16 DEVICE — SET SCREW
Type: IMPLANTABLE DEVICE | Site: SPINE LUMBAR | Status: FUNCTIONAL
Brand: INVICTUS

## 2021-06-16 DEVICE — CANNULATED EXTENDED TAB POLYAXIAL REDUCTION SCREW, 6.5 MM X 50 MM
Type: IMPLANTABLE DEVICE | Site: SPINE LUMBAR | Status: FUNCTIONAL
Brand: INVICTUS

## 2021-06-16 DEVICE — TI MIS LORDOTIC ROD, 5.5 MM X 110 MM
Type: IMPLANTABLE DEVICE | Site: SPINE LUMBAR | Status: FUNCTIONAL
Brand: INVICTUS

## 2021-06-16 RX ORDER — SODIUM CHLORIDE 9 MG/ML
INJECTION, SOLUTION INTRAVENOUS AS NEEDED
Status: DISCONTINUED | OUTPATIENT
Start: 2021-06-16 | End: 2021-06-16 | Stop reason: HOSPADM

## 2021-06-16 RX ORDER — MAGNESIUM HYDROXIDE 1200 MG/15ML
LIQUID ORAL AS NEEDED
Status: DISCONTINUED | OUTPATIENT
Start: 2021-06-16 | End: 2021-06-16 | Stop reason: HOSPADM

## 2021-06-16 RX ORDER — SODIUM CHLORIDE, SODIUM LACTATE, POTASSIUM CHLORIDE, CALCIUM CHLORIDE 600; 310; 30; 20 MG/100ML; MG/100ML; MG/100ML; MG/100ML
9 INJECTION, SOLUTION INTRAVENOUS CONTINUOUS
Status: DISCONTINUED | OUTPATIENT
Start: 2021-06-16 | End: 2021-06-17 | Stop reason: HOSPADM

## 2021-06-16 RX ORDER — LABETALOL HYDROCHLORIDE 5 MG/ML
5 INJECTION, SOLUTION INTRAVENOUS
Status: DISCONTINUED | OUTPATIENT
Start: 2021-06-16 | End: 2021-06-16 | Stop reason: HOSPADM

## 2021-06-16 RX ORDER — FLUMAZENIL 0.1 MG/ML
0.2 INJECTION INTRAVENOUS AS NEEDED
Status: DISCONTINUED | OUTPATIENT
Start: 2021-06-16 | End: 2021-06-16 | Stop reason: HOSPADM

## 2021-06-16 RX ORDER — ONDANSETRON 2 MG/ML
4 INJECTION INTRAMUSCULAR; INTRAVENOUS AS NEEDED
Status: DISCONTINUED | OUTPATIENT
Start: 2021-06-16 | End: 2021-06-16 | Stop reason: HOSPADM

## 2021-06-16 RX ORDER — PROPOFOL 10 MG/ML
VIAL (ML) INTRAVENOUS AS NEEDED
Status: DISCONTINUED | OUTPATIENT
Start: 2021-06-16 | End: 2021-06-16 | Stop reason: SURG

## 2021-06-16 RX ORDER — IBUPROFEN 600 MG/1
600 TABLET ORAL ONCE AS NEEDED
Status: DISCONTINUED | OUTPATIENT
Start: 2021-06-16 | End: 2021-06-16 | Stop reason: HOSPADM

## 2021-06-16 RX ORDER — SODIUM CHLORIDE 0.9 % (FLUSH) 0.9 %
10 SYRINGE (ML) INJECTION EVERY 12 HOURS SCHEDULED
Status: DISCONTINUED | OUTPATIENT
Start: 2021-06-16 | End: 2021-06-16 | Stop reason: HOSPADM

## 2021-06-16 RX ORDER — CEFAZOLIN SODIUM 1 G/50ML
1 INJECTION, SOLUTION INTRAVENOUS EVERY 8 HOURS
Status: DISCONTINUED | OUTPATIENT
Start: 2021-06-16 | End: 2021-06-17 | Stop reason: HOSPADM

## 2021-06-16 RX ORDER — ALBUTEROL SULFATE 90 UG/1
AEROSOL, METERED RESPIRATORY (INHALATION) AS NEEDED
Status: DISCONTINUED | OUTPATIENT
Start: 2021-06-16 | End: 2021-06-16 | Stop reason: SURG

## 2021-06-16 RX ORDER — ONDANSETRON 2 MG/ML
INJECTION INTRAMUSCULAR; INTRAVENOUS AS NEEDED
Status: DISCONTINUED | OUTPATIENT
Start: 2021-06-16 | End: 2021-06-16 | Stop reason: SURG

## 2021-06-16 RX ORDER — DEXAMETHASONE SODIUM PHOSPHATE 4 MG/ML
INJECTION, SOLUTION INTRA-ARTICULAR; INTRALESIONAL; INTRAMUSCULAR; INTRAVENOUS; SOFT TISSUE AS NEEDED
Status: DISCONTINUED | OUTPATIENT
Start: 2021-06-16 | End: 2021-06-16 | Stop reason: SURG

## 2021-06-16 RX ORDER — LIDOCAINE HYDROCHLORIDE 10 MG/ML
0.5 INJECTION, SOLUTION EPIDURAL; INFILTRATION; INTRACAUDAL; PERINEURAL ONCE AS NEEDED
Status: DISCONTINUED | OUTPATIENT
Start: 2021-06-16 | End: 2021-06-16 | Stop reason: HOSPADM

## 2021-06-16 RX ORDER — HYDROMORPHONE HYDROCHLORIDE 1 MG/ML
0.5 INJECTION, SOLUTION INTRAMUSCULAR; INTRAVENOUS; SUBCUTANEOUS
Status: DISCONTINUED | OUTPATIENT
Start: 2021-06-16 | End: 2021-06-16 | Stop reason: HOSPADM

## 2021-06-16 RX ORDER — SODIUM CHLORIDE 0.9 % (FLUSH) 0.9 %
10 SYRINGE (ML) INJECTION AS NEEDED
Status: DISCONTINUED | OUTPATIENT
Start: 2021-06-16 | End: 2021-06-16 | Stop reason: HOSPADM

## 2021-06-16 RX ORDER — PHENYLEPHRINE HCL IN 0.9% NACL 0.8MG/10ML
SYRINGE (ML) INTRAVENOUS AS NEEDED
Status: DISCONTINUED | OUTPATIENT
Start: 2021-06-16 | End: 2021-06-16 | Stop reason: SURG

## 2021-06-16 RX ORDER — SUFENTANIL CITRATE 50 UG/ML
INJECTION EPIDURAL; INTRAVENOUS AS NEEDED
Status: DISCONTINUED | OUTPATIENT
Start: 2021-06-16 | End: 2021-06-16 | Stop reason: SURG

## 2021-06-16 RX ORDER — MIDAZOLAM HYDROCHLORIDE 1 MG/ML
1 INJECTION INTRAMUSCULAR; INTRAVENOUS
Status: DISCONTINUED | OUTPATIENT
Start: 2021-06-16 | End: 2021-06-16 | Stop reason: HOSPADM

## 2021-06-16 RX ORDER — SODIUM CHLORIDE, SODIUM LACTATE, POTASSIUM CHLORIDE, CALCIUM CHLORIDE 600; 310; 30; 20 MG/100ML; MG/100ML; MG/100ML; MG/100ML
1000 INJECTION, SOLUTION INTRAVENOUS CONTINUOUS
Status: DISCONTINUED | OUTPATIENT
Start: 2021-06-16 | End: 2021-06-17 | Stop reason: HOSPADM

## 2021-06-16 RX ORDER — SODIUM CHLORIDE 0.9 % (FLUSH) 0.9 %
3 SYRINGE (ML) INJECTION AS NEEDED
Status: DISCONTINUED | OUTPATIENT
Start: 2021-06-16 | End: 2021-06-16 | Stop reason: HOSPADM

## 2021-06-16 RX ORDER — LIDOCAINE HYDROCHLORIDE 20 MG/ML
INJECTION, SOLUTION EPIDURAL; INFILTRATION; INTRACAUDAL; PERINEURAL AS NEEDED
Status: DISCONTINUED | OUTPATIENT
Start: 2021-06-16 | End: 2021-06-16 | Stop reason: SURG

## 2021-06-16 RX ORDER — NALOXONE HCL 0.4 MG/ML
0.04 VIAL (ML) INJECTION AS NEEDED
Status: DISCONTINUED | OUTPATIENT
Start: 2021-06-16 | End: 2021-06-16 | Stop reason: HOSPADM

## 2021-06-16 RX ORDER — ROCURONIUM BROMIDE 10 MG/ML
INJECTION, SOLUTION INTRAVENOUS AS NEEDED
Status: DISCONTINUED | OUTPATIENT
Start: 2021-06-16 | End: 2021-06-16 | Stop reason: SURG

## 2021-06-16 RX ORDER — FENTANYL CITRATE 50 UG/ML
25 INJECTION, SOLUTION INTRAMUSCULAR; INTRAVENOUS
Status: DISCONTINUED | OUTPATIENT
Start: 2021-06-16 | End: 2021-06-16 | Stop reason: HOSPADM

## 2021-06-16 RX ORDER — OXYCODONE AND ACETAMINOPHEN 10; 325 MG/1; MG/1
1 TABLET ORAL ONCE AS NEEDED
Status: DISCONTINUED | OUTPATIENT
Start: 2021-06-16 | End: 2021-06-16 | Stop reason: HOSPADM

## 2021-06-16 RX ORDER — DEXTROSE MONOHYDRATE 25 G/50ML
12.5 INJECTION, SOLUTION INTRAVENOUS AS NEEDED
Status: DISCONTINUED | OUTPATIENT
Start: 2021-06-16 | End: 2021-06-16 | Stop reason: HOSPADM

## 2021-06-16 RX ORDER — EPHEDRINE SULFATE 50 MG/ML
INJECTION, SOLUTION INTRAVENOUS AS NEEDED
Status: DISCONTINUED | OUTPATIENT
Start: 2021-06-16 | End: 2021-06-16 | Stop reason: SURG

## 2021-06-16 RX ORDER — SODIUM CHLORIDE 9 MG/ML
75 INJECTION, SOLUTION INTRAVENOUS CONTINUOUS
Status: DISCONTINUED | OUTPATIENT
Start: 2021-06-16 | End: 2021-06-17 | Stop reason: HOSPADM

## 2021-06-16 RX ADMIN — SODIUM CHLORIDE, POTASSIUM CHLORIDE, SODIUM LACTATE AND CALCIUM CHLORIDE 1000 ML: 600; 310; 30; 20 INJECTION, SOLUTION INTRAVENOUS at 09:45

## 2021-06-16 RX ADMIN — GABAPENTIN 600 MG: 300 CAPSULE ORAL at 21:13

## 2021-06-16 RX ADMIN — GABAPENTIN 600 MG: 300 CAPSULE ORAL at 15:09

## 2021-06-16 RX ADMIN — Medication 240 MCG: at 12:15

## 2021-06-16 RX ADMIN — DULOXETINE HYDROCHLORIDE 30 MG: 30 CAPSULE, DELAYED RELEASE ORAL at 15:02

## 2021-06-16 RX ADMIN — DEXAMETHASONE SODIUM PHOSPHATE 8 MG: 4 INJECTION, SOLUTION INTRA-ARTICULAR; INTRALESIONAL; INTRAMUSCULAR; INTRAVENOUS; SOFT TISSUE at 12:04

## 2021-06-16 RX ADMIN — HYDROMORPHONE HYDROCHLORIDE 0.5 MG: 1 INJECTION, SOLUTION INTRAMUSCULAR; INTRAVENOUS; SUBCUTANEOUS at 13:42

## 2021-06-16 RX ADMIN — SODIUM CHLORIDE 75 ML/HR: 9 INJECTION, SOLUTION INTRAVENOUS at 16:24

## 2021-06-16 RX ADMIN — CEFAZOLIN SODIUM 1 G: 1 INJECTION, SOLUTION INTRAVENOUS at 20:08

## 2021-06-16 RX ADMIN — SODIUM CHLORIDE, PRESERVATIVE FREE 3 ML: 5 INJECTION INTRAVENOUS at 15:03

## 2021-06-16 RX ADMIN — EPHEDRINE SULFATE 15 MG: 50 INJECTION INTRAVENOUS at 12:31

## 2021-06-16 RX ADMIN — ONDANSETRON 4 MG: 2 INJECTION INTRAMUSCULAR; INTRAVENOUS at 12:46

## 2021-06-16 RX ADMIN — ROCURONIUM BROMIDE 20 MG: 10 INJECTION INTRAVENOUS at 12:04

## 2021-06-16 RX ADMIN — HYDROMORPHONE HYDROCHLORIDE 0.5 MG: 1 INJECTION, SOLUTION INTRAMUSCULAR; INTRAVENOUS; SUBCUTANEOUS at 13:30

## 2021-06-16 RX ADMIN — PROPOFOL 150 MG: 10 INJECTION, EMULSION INTRAVENOUS at 11:27

## 2021-06-16 RX ADMIN — SUGAMMADEX 200 MG: 100 INJECTION, SOLUTION INTRAVENOUS at 12:22

## 2021-06-16 RX ADMIN — DOCUSATE SODIUM 50 MG AND SENNOSIDES 8.6 MG 1 TABLET: 8.6; 5 TABLET, FILM COATED ORAL at 15:03

## 2021-06-16 RX ADMIN — Medication 240 MCG: at 12:23

## 2021-06-16 RX ADMIN — HYDROMORPHONE HYDROCHLORIDE 1 MG: 1 INJECTION, SOLUTION INTRAMUSCULAR; INTRAVENOUS; SUBCUTANEOUS at 08:28

## 2021-06-16 RX ADMIN — Medication 160 MCG: at 12:02

## 2021-06-16 RX ADMIN — METFORMIN HYDROCHLORIDE 500 MG: 500 TABLET ORAL at 15:02

## 2021-06-16 RX ADMIN — Medication 160 MCG: at 12:07

## 2021-06-16 RX ADMIN — ALBUTEROL SULFATE 5 PUFF: 90 AEROSOL, METERED RESPIRATORY (INHALATION) at 11:40

## 2021-06-16 RX ADMIN — ROCURONIUM BROMIDE 50 MG: 10 INJECTION INTRAVENOUS at 11:27

## 2021-06-16 RX ADMIN — DOCUSATE SODIUM 50 MG AND SENNOSIDES 8.6 MG 1 TABLET: 8.6; 5 TABLET, FILM COATED ORAL at 20:08

## 2021-06-16 RX ADMIN — FENTANYL CITRATE 25 MCG: 50 INJECTION, SOLUTION INTRAMUSCULAR; INTRAVENOUS at 13:45

## 2021-06-16 RX ADMIN — ALBUTEROL SULFATE 5 PUFF: 90 AEROSOL, METERED RESPIRATORY (INHALATION) at 11:55

## 2021-06-16 RX ADMIN — SODIUM CHLORIDE, PRESERVATIVE FREE 3 ML: 5 INJECTION INTRAVENOUS at 20:09

## 2021-06-16 RX ADMIN — HYDROCODONE BITARTRATE AND ACETAMINOPHEN 2 TABLET: 7.5; 325 TABLET ORAL at 15:03

## 2021-06-16 RX ADMIN — ATORVASTATIN CALCIUM 10 MG: 10 TABLET, FILM COATED ORAL at 20:08

## 2021-06-16 RX ADMIN — LIDOCAINE HYDROCHLORIDE 100 MG: 20 INJECTION, SOLUTION EPIDURAL; INFILTRATION; INTRACAUDAL; PERINEURAL at 11:27

## 2021-06-16 RX ADMIN — SUFENTANIL CITRATE 15 MCG: 50 INJECTION EPIDURAL; INTRAVENOUS at 12:23

## 2021-06-16 RX ADMIN — CEFAZOLIN 3 G: 1 INJECTION, POWDER, FOR SOLUTION INTRAMUSCULAR; INTRAVENOUS; PARENTERAL at 11:52

## 2021-06-16 RX ADMIN — HYDROMORPHONE HYDROCHLORIDE 1 MG: 1 INJECTION, SOLUTION INTRAMUSCULAR; INTRAVENOUS; SUBCUTANEOUS at 05:53

## 2021-06-16 RX ADMIN — CETIRIZINE HYDROCHLORIDE 10 MG: 10 TABLET, FILM COATED ORAL at 15:02

## 2021-06-16 RX ADMIN — LISINOPRIL 5 MG: 5 TABLET ORAL at 15:02

## 2021-06-16 RX ADMIN — HYDROCODONE BITARTRATE AND ACETAMINOPHEN 2 TABLET: 7.5; 325 TABLET ORAL at 21:18

## 2021-06-16 RX ADMIN — SUFENTANIL CITRATE 10 MCG: 50 INJECTION EPIDURAL; INTRAVENOUS at 12:48

## 2021-06-16 RX ADMIN — SUFENTANIL CITRATE 10 MCG: 50 INJECTION EPIDURAL; INTRAVENOUS at 12:56

## 2021-06-16 RX ADMIN — SUFENTANIL CITRATE 15 MCG: 50 INJECTION EPIDURAL; INTRAVENOUS at 11:27

## 2021-06-16 NOTE — ANESTHESIA PROCEDURE NOTES
Airway  Urgency: elective    Date/Time: 6/16/2021 11:27 AM  Airway not difficult    General Information and Staff    Patient location during procedure: OR  CRNA: Luz Jackson CRNA    Indications and Patient Condition  Indications for airway management: airway protection    Preoxygenated: yes  Mask difficulty assessment: 1 - vent by mask    Final Airway Details  Final airway type: endotracheal airway      Successful airway: ETT  Cuffed: yes   Successful intubation technique: direct laryngoscopy and video laryngoscopy  Facilitating devices/methods: intubating stylet  Endotracheal tube insertion site: oral  Blade: Sr  Blade size: 4  ETT size (mm): 7.5  Cormack-Lehane Classification: grade IIa - partial view of glottis  Placement verified by: chest auscultation and capnometry   Measured from: teeth  ETT/EBT  to teeth (cm): 23  Number of attempts at approach: 1  Assessment: lips, teeth, and gum same as pre-op and atraumatic intubation

## 2021-06-16 NOTE — ANESTHESIA POSTPROCEDURE EVALUATION
"Patient: Jagdish Kulkarni    Procedure Summary     Date: 06/16/21 Room / Location: [facility] OR  /  [facility] OR    Anesthesia Start: 1124 Anesthesia Stop: 1315    Procedures:       REMOVAL OF INSTRUMENTATION EXPLORATION OF FUSION LEFT L4-S1, POSTERIOR SPINAL FUSION L3-4 WITH INSTRUMENTATION L3-S1 (Left Spine Lumbar)      REMOVAL OF INSTRUMENTATION (N/A Spine Lumbar) Diagnosis: (M54.16)    Surgeons: EDIE Castro MD Provider: Luz Jackson CRNA    Anesthesia Type: general ASA Status: 2          Anesthesia Type: general    Vitals  Vitals Value Taken Time   /70 06/16/21 1400   Temp 97 °F (36.1 °C) 06/16/21 1400   Pulse 103 06/16/21 1411   Resp 18 06/16/21 1400   SpO2 93 % 06/16/21 1410   Vitals shown include unvalidated device data.        Post Anesthesia Care and Evaluation    PONV Status: none  Comments: Patient d/c from PACU prior to anes eval based on Teresa score.  Please see RN notes for details of d/c criteria.    Blood pressure 117/65, pulse 99, temperature 98.3 °F (36.8 °C), temperature source Axillary, resp. rate 20, height 174 cm (68.5\"), weight 111 kg (245 lb), SpO2 96 %.          "

## 2021-06-16 NOTE — PLAN OF CARE
Goal Outcome Evaluation:   Patient alert and oriented x 4. No distress noted. Minimal pain ths shift. Tx with as needed norco. Tolerating well with good relief. Dressing to right flank clean dry and intact. Ambulating with minimal assistance. NPO since midnight for OR. Will continue POC

## 2021-06-16 NOTE — NURSING NOTE
Bedside neuro & neurovascular check completed with TERI Navarro. No changes noted. Dressing to back monitored.

## 2021-06-16 NOTE — PLAN OF CARE
Goal Outcome Evaluation:               Patient had second part of his back surgery today. Returned from surgery with complaints of pain. Norco given with good results. Patient has been up to chair and bathroom with brace on. No neuro deficits and strength is equal bilaterally.

## 2021-06-16 NOTE — OP NOTE
Revision posterior lumbar fusion with instrumentation procedure Note    Jagdish Kulkarni  6/16/2021    Pre-op Diagnosis:     1. Status post left LLIF L4-5, Dr. Mitul Schafer, 05/16/2017.  2. Status post TLIF L5-S1, PSF with instrumentation L4 to S1, Dr. Mitul Schafer, 06/01/2017.  3. Recurrent low back pain.  4. Recurrent bilateral buttock, thigh and leg radiculopathy, left worse than right.  5. Neurogenic claudication.  6. Adjacent level degenerative disk disease, L3-4.  7. Facet arthropathy, L3-4.  8. Foraminal disk herniation, left L3-4.  9. Congenital short pedicle syndrome.  10. Central and bilateral foraminal stenosis, left worse than right L3-4.  11.  Status post right LLIF with instrumentation L3-4, 6/14/2021    Post-op Diagnosis:    same    Procedure/CPT® Codes:    1.  Removal of posterior instrumentation left L4-S1  2.  Exploration of fusion L4-S1  3.  Posterior spinal fusion L3-4  4.  Posterior spinal instrumentation left L3 to S1 (ATEC pedicle screws and stuart)  5.  Use of locally obtained autograft bone for fusion L3-4  6.  Use of fluoroscopy for confirmation of surgical level and placement of instrumentation  7.  Intraoperative neural monitoring with pedicle screw stimulation     Anesthesia: General     Surgeon: IAN Castro MD     Assistant: Herb Christianson PA-C     Estimated Blood Loss: 25 mL     Complications: None     Condition: Stable to PACU.     Indications:     The patient is a 45-year-old who sees Dr. Avtar Guerrero for medical issues.  He presented to the office with a history of a prior staged fusion procedure from L4-S1 performed by my partner Dr. Mitul Schafer in 2017.  Unfortunately, he presented with recurrent low back pain as well as a recurrence of symptoms down his lower extremities, with the left side worse than the right.  His symptoms were consistent with neurogenic claudication.  Imaging studies revealed adjacent level degenerative disc disease and facet arthropathy at L3-4 along with a  foraminal disc herniation and congenital short pedicle syndrome causing central and bilateral foraminal stenosis that was worse on the left than the right and matched his symptoms.     After failing all conservative measures, it was mutually decided that surgery would be the best option. Risks, benefits, and complications of surgery were discussed with the patient. The patient appeared well informed and wished to proceed. We specifically discussed the risk of infection, blood loss, nerve root injury, CSF leak, and the possibility of incomplete resolution of symptoms.  We also discussed the risk of a nonunion and the potential need for additional surgery in the event of a pseudoarthrosis or hardware failure.     We elected to proceed with surgery in a staged fashion.  Previously on 6/14/2021, the patient underwent a right lateral fusion of L3-4 with instrumentation.  Today we return to surgery for the second posterior stage of the procedure.     Operative Procedure:     After obtaining informed consent and verifying the correct operative site, the patient was brought to the operating room. A general anesthetic was provided by the anesthesia service with the assistance of an endotracheal tube. Once this was appropriately positioned and secured, the patient was carefully rotated prone onto a Corey frame. All bony processes were well-padded. The lumbar region was prepped and draped in usual sterile fashion. A surgical timeout was taken to confirm this was the correct patient, we were working at the correct levels, and that preoperative antibiotics were given in a timely fashion.      Next, the left-sided Wiltsey incision spanning the previous instrumentation from L4-S1 was reopened using a 10 blade scalpel.  Dissection was carried through subcutaneous tissues using Bovie cautery. The fascia was divided in line with the incision. Blunt dissection was carried between the multifidus and longissimus muscles down to the  previously placed instrumentation spanning L4-S1. There was some scar tissue as expected around the screws and between the musculature.  The previous instrumentation was completely exposed using Bovie cautery removing some fibrous scar tissue from around the screw heads themselves. The appropriate screwdriver and antitorque wrench was used to remove the setscrews. The stuart was then taken out with a stuart wood. The area was then thoroughly explored.  All of the pedicle screws appeared to be relatively tight with no evidence of loosening.  There appeared to be adequate bone graft in the posterior lateral gutter consistent with a solid fusion from L4 to S1.     Next the appropriate pedicle screwdriver was used to remove the actual pedicle screws from L4, L5, and S1 on the left.    The pedicle tracts were then palpated with a ball-tipped feeler to ensure that there is no medial or lateral breach.  All the screw tracts appeared adequate for use with new screws.  Bleeding from the pedicles where screws were removed was controlled using thrombin with Gelfoam powder. The wound was then copiously irrigated with saline solution.     The left sided Wiltsey incision was then extended using the 10 blade scalpel up to L3.  The same intramuscular plane was used to gain access to the facet joint of L3-4 and the transverse process of L3.  Self retaining retractors were placed for continuous exposure.  The cautery was used to expose as much bony surface area on the transverse processes and to destroy the facet capsule L3-4.  The wound was then thoroughly irrigated with saline solution.     Next under fluoroscopic guidance, I created starting holes for the placement of a new pedicle screw at L3.  A starting hole was created with a high-speed bur and the pedicle track was then created using a pedicle probe gaining access through the pedicle to the anterior vertebral body.  The pedicle tract was palpated with a ball-tipped feeler to  ensure that the track was adequate.  Into the pedicle of L3 on the left, I placed a new screw measuring 6.5 x 50 mm from the [facility] instrumentation set.  I then replaced new screws into the prior pedicle tracts at L4, L5, and S1 also measuring 6.5 x 50 mm.  All screws had excellent purchase.       Fluoroscopy was used to confirm that all instrumentation was properly positioned in both the AP and lateral projections.  The wound was then copiously irrigated with saline solution.  I used a neuro monitoring probe to stimulate the screws to ensure that there were adequately positioned.  All screws appeared well positioned.     The high-speed bur was used to decorticate the posterior elements of L3 as well as the previous fusion mass on the left side from L4 to S1.  I also decorticated the facet joint of L3-4.  The local bone was left in situ to assist with obtaining a fusion.       This bone graft was packed as tightly as possible into the posterior lateral gutter bilaterally between L3 and L4. This constituted a posterior fusion of L3-4.     Next an appropriate stuart was chosen to span the pedicle screw instrumentation now spanning L3 to S1 on the left.  Set screws were inserted into the pedicle screw saddles fixing the rods into position.  The setscrews were then tightened using the appropriate antitorque wrench and torque limiting screwdriver provided by [facility].     After insuring that we had adequate hemostasis, closure was then undertaken using a #1 Vicryl to reapproximate the fascia. Immediate subcutaneous tissues were closed with a 2-0 Vicryl and skin closure was then accomplished using Mastisol and Steri-Strips.  I did infuse the subcutaneous layer with half percent Marcaine to assist with postoperative pain control.  The wound was then washed and sterilely dressed. The patient was then carefully rotated supine onto a hospital gurney, extubated, and sent to the recovery room in good stable condition. The patient  tolerated the procedure well, there were no complications.  Estimated blood loss was approximately 25 mL.     Herb Christianson PA-C provided critical assistance during the procedure.  His assistance was medically necessary in order to allow the procedure to occur in the most safe and efficient manner.  He assisted not only with the removal of instrumentation and exploration of fusion from L4 to S1, but also assisted with the placement of new instrumentation and bone graft spanning the whole construct from L3 to S1.    INA Castro MD     Date: 6/16/2021  Time: 12:54 CDT

## 2021-06-17 ENCOUNTER — READMISSION MANAGEMENT (OUTPATIENT)
Dept: CALL CENTER | Facility: HOSPITAL | Age: 46
End: 2021-06-17

## 2021-06-17 VITALS
HEART RATE: 85 BPM | RESPIRATION RATE: 18 BRPM | BODY MASS INDEX: 36.29 KG/M2 | SYSTOLIC BLOOD PRESSURE: 115 MMHG | HEIGHT: 69 IN | DIASTOLIC BLOOD PRESSURE: 67 MMHG | OXYGEN SATURATION: 98 % | WEIGHT: 245 LBS | TEMPERATURE: 97.5 F

## 2021-06-17 PROCEDURE — 97168 OT RE-EVAL EST PLAN CARE: CPT

## 2021-06-17 PROCEDURE — 97116 GAIT TRAINING THERAPY: CPT | Performed by: PHYSICAL THERAPIST

## 2021-06-17 PROCEDURE — 25010000002 CEFAZOLIN 1-4 GM/50ML-% SOLUTION: Performed by: PHYSICIAN ASSISTANT

## 2021-06-17 PROCEDURE — 97164 PT RE-EVAL EST PLAN CARE: CPT | Performed by: PHYSICAL THERAPIST

## 2021-06-17 RX ORDER — AMOXICILLIN 250 MG
1 CAPSULE ORAL 2 TIMES DAILY
Qty: 60 TABLET | Refills: 0 | Status: SHIPPED | OUTPATIENT
Start: 2021-06-17 | End: 2021-12-27

## 2021-06-17 RX ORDER — HYDROCODONE BITARTRATE AND ACETAMINOPHEN 10; 325 MG/1; MG/1
1 TABLET ORAL EVERY 6 HOURS PRN
Qty: 60 TABLET | Refills: 0 | Status: SHIPPED | OUTPATIENT
Start: 2021-06-17

## 2021-06-17 RX ORDER — GABAPENTIN 600 MG/1
600 TABLET ORAL 3 TIMES DAILY
Qty: 90 TABLET | Refills: 0 | Status: SHIPPED | OUTPATIENT
Start: 2021-06-17

## 2021-06-17 RX ORDER — DULOXETIN HYDROCHLORIDE 30 MG/1
30 CAPSULE, DELAYED RELEASE ORAL DAILY
Qty: 30 CAPSULE | Refills: 2 | Status: SHIPPED | OUTPATIENT
Start: 2021-06-17 | End: 2022-03-14 | Stop reason: DRUGHIGH

## 2021-06-17 RX ADMIN — SODIUM CHLORIDE 75 ML/HR: 9 INJECTION, SOLUTION INTRAVENOUS at 03:35

## 2021-06-17 RX ADMIN — METFORMIN HYDROCHLORIDE 500 MG: 500 TABLET ORAL at 10:24

## 2021-06-17 RX ADMIN — DULOXETINE HYDROCHLORIDE 30 MG: 30 CAPSULE, DELAYED RELEASE ORAL at 10:24

## 2021-06-17 RX ADMIN — PANTOPRAZOLE SODIUM 40 MG: 40 TABLET, DELAYED RELEASE ORAL at 05:41

## 2021-06-17 RX ADMIN — CETIRIZINE HYDROCHLORIDE 10 MG: 10 TABLET, FILM COATED ORAL at 10:24

## 2021-06-17 RX ADMIN — DOCUSATE SODIUM 50 MG AND SENNOSIDES 8.6 MG 1 TABLET: 8.6; 5 TABLET, FILM COATED ORAL at 10:24

## 2021-06-17 RX ADMIN — SODIUM CHLORIDE, PRESERVATIVE FREE 3 ML: 5 INJECTION INTRAVENOUS at 10:24

## 2021-06-17 RX ADMIN — GABAPENTIN 600 MG: 300 CAPSULE ORAL at 05:41

## 2021-06-17 RX ADMIN — HYDROCODONE BITARTRATE AND ACETAMINOPHEN 1 TABLET: 7.5; 325 TABLET ORAL at 02:27

## 2021-06-17 RX ADMIN — CEFAZOLIN SODIUM 1 G: 1 INJECTION, SOLUTION INTRAVENOUS at 03:35

## 2021-06-17 RX ADMIN — LISINOPRIL 5 MG: 5 TABLET ORAL at 10:23

## 2021-06-17 NOTE — PAYOR COMM NOTE
"AUTH: [id number]    Jagdish Hernandez (45 y.o. Male)     Date of Birth Social Security Number Address Home Phone MRN    1975  [address] [facility] [address] [phone] [id number]    [employer]oration[employer] Marital Status          Religious        Admission Date Admission Type Admitting Provider Attending Provider Department, Room/Bed    6/14/21 Elective EDIE Castro MD Strenge, K Brandon, MD [facility] 3A, 337/1    Discharge Date Discharge Disposition Discharge Destination         Home or Self Care              Attending Provider: EDIE Castro MD    Allergies: Other, Oxycontin [Oxycodone]    Isolation: None   Infection: None   Code Status: CPR    Ht: 174 cm (68.5\")   Wt: 111 kg (245 lb)    Admission Cmt: None   Principal Problem: None                Active Insurance as of 6/14/2021     Primary Coverage     Payor Plan Insurance Group Employer/Plan Group    [employer] [place] [employer] [place]ICA[place]      Payor Plan Address Payor Plan Phone Number Payor Plan Fax Number Effective Dates    [address] [address] [phone]  4/12/2019 - None Entered    [facility] [id number]       Subscriber Name Subscriber Birth Date Member ID       JAGDISH HERNANDEZ 1975 [id number]                 Emergency Contacts      (Rel.) Home Phone Work Phone Mobile Phone    link hernandez (Spouse) -- -- [phone]               Discharge Summary      Hermes Burkett PA at 06/17/21 0754     Attestation signed by EDIE Castro MD at 06/17/21 0748    I have reviewed this documentation and agree.                    [facility]  SPINE SURGERY  LUCIANO Real  DISCHARGE SUMMARY  Patient ID:  Jagdish Hernandez  [id number]  45 y.o.  1975    Admit date: 6/14/2021    Discharge date and time: 6/17/2021    Admitting Physician: EDIE Castro MD     Indication for Admission: patient admitted for staged surgical procedure     Admission Diagnoses: Lumbar pain " [M54.5]    Discharge Diagnoses: Lumbar pain [M54.5]    Procedures: lateral lumbar fusion L3/L4, removal of instrumentation L4-S1, posterior spinal fusion L3/L4    Problem List:   DDD (degenerative disc disease), lumbar    Chronic bilateral low back pain without sciatica    Lumbar pain      Consults: none    Admission Condition: stable    Discharged Condition: stable    Hospital Course: no immediate post surgical complication     Disposition: home    Patient Instructions:      Discharge Medications      ASK your doctor about these medications      Instructions Start Date   cetirizine 10 MG tablet  Commonly known as: zyrTEC   TAKE ONE TABLET BY MOUTH EVERY DAY      DULoxetine 30 MG capsule  Commonly known as: CYMBALTA   1 capsule, Daily      gabapentin 600 MG tablet  Commonly known as: NEURONTIN   600 mg, Oral, 3 Times Daily PRN      HYDROcodone-acetaminophen  MG per tablet  Commonly known as: NORCO    tablets, Oral, 3 Times Daily PRN      lisinopril 5 MG tablet  Commonly known as: PRINIVIL,ZESTRIL   5 mg, Oral, Daily      meclizine 25 MG tablet  Commonly known as: ANTIVERT   25 mg, Oral, 3 Times Daily PRN      metFORMIN 500 MG tablet  Commonly known as: GLUCOPHAGE   500 mg, Oral, Daily With Breakfast      naproxen sodium 220 MG tablet  Commonly known as: ALEVE   220 mg, Oral, 2 Times Daily PRN      omeprazole 20 MG capsule  Commonly known as: priLOSEC   TAKE ONE CAPSULE BY MOUTH EVERY DAY      simvastatin 20 MG tablet  Commonly known as: ZOCOR   20 mg, Oral, Daily      tiZANidine 4 MG tablet  Commonly known as: ZANAFLEX   4 mg, Oral, Every 8 Hours PRN           Activity: Avoid bending lifting or twisting, brace when up and out of bed, no driving while taking narcotic pain medication, walk 15 minutes 4 times daily  Diet: regular diet  Wound Care: keep wound clean and dry  Other: Contact [facility] office with questions or concerns    Follow-up with Dr Castro or PA's in 2  weeks.    Electronically signed by LUCIANO Real 07:26 CDT 6/17/2021      Electronically signed by DEIE Castro MD at 06/17/21 0733

## 2021-06-17 NOTE — PLAN OF CARE
Goal Outcome Evaluation:  Plan of Care Reviewed With: patient, spouse        Progress: improving  Outcome Summary: Pt A&Ox4. Denies n/t. FLOWER. PPP. R flank drsg, Back drsg -  dry,intact, scant dried drainage noted. Drsgs to be changed before DC. SCD. , room air. C/o pain, relieved with repositioning. LSO when out bed. Call light within reach. Safety maintained. Plan to DC home today.

## 2021-06-17 NOTE — THERAPY DISCHARGE NOTE
Acute Care - Occupational Therapy Re-Evaluation/Discharge  [facility]     Patient Name: Jagdish Kulkarni  : 1975  MRN: [id number]  Today's Date: 2021     Date of Referral to OT: 21         Admit Date: 2021       ICD-10-CM ICD-9-CM   1. Chronic bilateral low back pain without sciatica  M54.5 724.2    G89.29 338.29   2. Decreased activities of daily living (ADL)  Z78.9 V49.89   3. Impaired mobility  Z74.09 799.89   4. DDD (degenerative disc disease), lumbar  M51.36 722.52     Patient Active Problem List   Diagnosis   • Essential hypertension   • Mixed hyperlipidemia   • Obesity   • Hyperglycemia   • DDD (degenerative disc disease), lumbar   • Chronic bilateral low back pain without sciatica   • Lumbar pain     Past Medical History:   Diagnosis Date   • Chronic bilateral low back pain without sciatica 2021   • Diabetes mellitus (CMS/HCC)     border line   • Elevated cholesterol    • GERD (gastroesophageal reflux disease)    • Hyperlipidemia    • Hypertension    • PONV (postoperative nausea and vomiting)      Past Surgical History:   Procedure Laterality Date   • CHOLECYSTECTOMY     • LUMBAR FUSION Right 2021    Procedure: RIGHT LATERAL LUMBAR INTERBODY FUSION WITH INSTRUMENTATION L3-4;  Surgeon: EDIE Castro MD;  Location: [facility];  Service: Orthopedic Spine;  Laterality: Right;   • LUMBAR SPINE SURGERY            OT ASSESSMENT FLOWSHEET (last 12 hours)      OT Evaluation and Treatment     Row Name 21 0859                   OT Time and Intention    Subjective Information  complains of;pain  -AC        Document Type  re-evaluation  -AC        Mode of Treatment  occupational therapy  -AC           General Information    Patient Profile Reviewed  yes  -AC        Prior Level of Function  independent:;all household mobility;community mobility;gait;transfer;bed mobility;ADL's;home management;cooking;cleaning;driving  -AC        Pertinent History of Current Functional Problem  s/p  removal of instrumentation of fusion L L4-S1,Post spinal fusion L3-L4 with instrumentation L3-S1  -AC        Existing Precautions/Restrictions  brace worn when out of bed;fall;spinal  -AC        Barriers to Rehab  none identified  -           Living Environment    Current Living Arrangements  home/apartment/condo  -        Lives With  child(gretchen), adult;child(gretchen), dependent;spouse  -AC           Home Main Entrance    Number of Stairs, Main Entrance  six  -AC        Stair Railings, Main Entrance  railing on left side (ascending)  -           Cognition    Orientation Status (Cognition)  oriented x 4  -AC           Pain Assessment    Additional Documentation  Pain Scale: Numbers Pre/Post-Treatment (Group)  -           Pain Scale: Numbers Pre/Post-Treatment    Pretreatment Pain Rating  5/10  -AC        Posttreatment Pain Rating  6/10  -AC        Pain Location - Orientation  lower  -AC        Pain Location  back  -AC        Pain Intervention(s)  Repositioned;Ambulation/increased activity  -           Range of Motion Comprehensive    Comment, General Range of Motion  WFL AROM BUE  -           Strength Comprehensive (MMT)    Comment, General Manual Muscle Testing (MMT) Assessment  functionally 4+/5 BUE  -           Bed Mobility    Bed Mobility  sidelying-sit;rolling left;sit-sidelying  -AC        Rolling Left [place] (Bed Mobility)  independent  -        Sidelying-Sit [place] (Bed Mobility)  modified independence  -        Assistive Device (Bed Mobility)  bed rails  -           Functional Mobility    Functional Mobility- Ind. Level  standby assist  -        Functional Mobility- Comment  in hallway, up/down 6 stairs, back to bed  -           Transfer Assessment/Treatment    Transfers  sit-stand transfer;stand-sit transfer;bathtub transfer  -           Transfers    Sit-Stand [place] (Transfers)  independent  -        Stand-Sit [place] (Transfers)  independent  -         [place] Level (Bathtub Transfer)  minimum assist (75% patient effort)  -AC           Bathtub Transfer    Type (Bathtub Transfer)  lateral simulated  -AC           Safety Issues, Functional Mobility    Impairments Affecting Function (Mobility)  balance;pain;range of motion (ROM);strength  -AC           Balance    Balance Assessment  sitting static balance;sitting dynamic balance;standing static balance;standing dynamic balance  -AC        Static Sitting Balance  WNL  -AC        Dynamic Sitting Balance  WNL  -AC        Static Standing Balance  WFL  -AC        Dynamic Standing Balance  mild impairment;standing  -AC           Activities of Daily Living    BADL Assessment/Intervention  upper body dressing;lower body dressing  -AC           Upper Body Dressing Assessment/Training    [place] Level (Upper Body Dressing)  don;set up;independent LSO  -AC        Position (Upper Body Dressing)  edge of bed sitting  -AC           Lower Body Dressing Assessment/Training    [place] Level (Lower Body Dressing)  don;doff;socks;dependent (less than 25% patient effort)  -AC        Position (Lower Body Dressing)  edge of bed sitting  -AC           BADL Safety/Performance    Impairments, BADL Safety/Performance  balance;pain;range of motion;strength  -AC           Wound 06/14/21 0739 Right flank Incision    Wound - Properties Group Placement Date: 06/14/21  -KM Placement Time: 0739  -KM Side: Right  -KM Location: flank  -KM Primary Wound Type: Incision  -KM    Retired Wound - Properties Group Date first assessed: 06/14/21  -KM Time first assessed: 0739  -KM Side: Right  -KM Location: flank  -KM Primary Wound Type: Incision  -KM       Wound 06/16/21 1200 lumbar spine Incision    Wound - Properties Group Placement Date: 06/16/21  -TN Placement Time: 1200  -TN Present on Hospital Admission: N  -TN Location: lumbar spine  -TN Primary Wound Type: Incision  -TN    Retired Wound - Properties Group Date first assessed: 06/16/21   -TN Time first assessed: 1200  -TN Present on Hospital Admission: N  -TN Location: lumbar spine  -TN Primary Wound Type: Incision  -TN       Transfer Goal 1 (OT)    Activity/Assistive Device (Transfer Goal 1, OT)  sit-to-stand/stand-to-sit;bed-to-chair/chair-to-bed;toilet  -AC        [place] Level/Cues Needed (Transfer Goal 1, OT)  independent  -AC        Time Frame (Transfer Goal 1, OT)  long term goal (LTG);10 days  -AC        Progress/Outcome (Transfer Goal 1, OT)  goal met  -AC           Dressing Goal 1 (OT)    Activity/Device (Dressing Goal 1, OT)  lower body dressing  -AC        [place]/Cues Needed (Dressing Goal 1, OT)  minimum assist (75% or more patient effort)  -AC        Time Frame (Dressing Goal 1, OT)  long term goal (LTG);10 days  -AC        Progress/Outcome (Dressing Goal 1, OT)  goal not met  -AC           Toileting Goal 1 (OT)    Activity/Device (Toileting Goal 1, OT)  toileting skills, all  -AC        [place] Level/Cues Needed (Toileting Goal 1, OT)  set-up required  -AC        Time Frame (Toileting Goal 1, OT)  long term goal (LTG);10 days  -AC        Progress/Outcome (Toileting Goal 1, OT)  goal not met  -AC           Positioning and Restraints    Pre-Treatment Position  in bed  -AC        Post Treatment Position  bed  -AC        In Bed  sitting EOB;call light within reach;encouraged to call for assist;with family/caregiver;with PT;side rails up x1;with brace  -AC           Therapy Assessment/Plan (OT)    Date of Referral to OT  06/16/21  -AC        Therapy Frequency (OT)  evaluation only  -AC          User Key  (r) = Recorded By, (t) = Taken By, (c) = Cosigned By    Initials Name Effective Dates    AC Hans Neville OTR/L, CNT 04/09/19 -     Maci Sandy RN 01/13/20 - 06/15/21    Ana Leong RN 02/03/20 - 06/15/21          Occupational Therapy Education                 Title: PT OT SLP Therapies (In Progress)     Topic: Occupational Therapy (In Progress)     Point:  ADL training (Done)     Description:   Instruct learner(s) on proper safety adaptation and remediation techniques during self care or transfers.   Instruct in proper use of assistive devices.              Learning Progress Summary           Patient Acceptance, E,TB,D, VU,DU by  at 6/17/2021 0929    Acceptance, E,TB, VU by CE at 6/14/2021 1443    Comment: Pt educated on safety with ADL performance, spinal precautions, brace management, and log rolling method.                   Point: Home exercise program (Not Started)     Description:   Instruct learner(s) on appropriate technique for monitoring, assisting and/or progressing therapeutic exercises/activities.              Learner Progress:  Not documented in this visit.          Point: Precautions (Done)     Description:   Instruct learner(s) on prescribed precautions during self-care and functional transfers.              Learning Progress Summary           Patient Acceptance, E,TB,D, VU,DU by  at 6/17/2021 0929    Acceptance, E,TB, VU by CE at 6/14/2021 1443    Comment: Pt educated on safety with ADL performance, spinal precautions, brace management, and log rolling method.                   Point: Body mechanics (Done)     Description:   Instruct learner(s) on proper positioning and spine alignment during self-care, functional mobility activities and/or exercises.              Learning Progress Summary           Patient Acceptance, E,TB,D, VU,DU by  at 6/17/2021 0929                               User Key     Initials Effective Dates Name Provider Type Discipline     04/09/19 -  Hans Neville, OTR/L, CNT Occupational Therapist OT    CE 05/10/21 -  Kori Rolon OT Student OT Student OT                OT Recommendation and Plan  Therapy Frequency (OT): evaluation only  Plan of Care Review  Plan of Care Reviewed With: patient  Progress: improving  Outcome Summary: OT reeval completed.  Pt reports he has been up independently to BR overnight and walked  the halls independently.  He was Mark for log rolling and demonstrated sitting balance WNL at EOB.  Dons LSO with set up.  Dependent for LB dressing d/t pain.  Transfers with S and ambulates in hallway with SBA.  He simulated tub shower transfer in hallway with Isabel and UE support.  Suggested pt use walk in shower until he is feeling stronger.  Pt was educated on spinal precautions, body mechanics, home safety and pain mgmt.  Pt is ready for discharge home with family with no need for skilled OT.  Plan of Care Reviewed With: patient  Outcome Summary: OT reeval completed.  Pt reports he has been up independently to BR overnight and walked the halls independently.  He was Mark for log rolling and demonstrated sitting balance WNL at EOB.  Dons LSO with set up.  Dependent for LB dressing d/t pain.  Transfers with S and ambulates in hallway with SBA.  He simulated tub shower transfer in hallway with Isabel and UE support.  Suggested pt use walk in shower until he is feeling stronger.  Pt was educated on spinal precautions, body mechanics, home safety and pain mgmt.  Pt is ready for discharge home with family with no need for skilled OT.     OT Rehab Goals     Row Name 06/17/21 0859             Transfer Goal 1 (OT)    Activity/Assistive Device (Transfer Goal 1, OT)  sit-to-stand/stand-to-sit;bed-to-chair/chair-to-bed;toilet  -AC      [place] Level/Cues Needed (Transfer Goal 1, OT)  independent  -AC      Time Frame (Transfer Goal 1, OT)  long term goal (LTG);10 days  -AC      Progress/Outcome (Transfer Goal 1, OT)  goal met  -AC         Dressing Goal 1 (OT)    Activity/Device (Dressing Goal 1, OT)  lower body dressing  -AC      [place]/Cues Needed (Dressing Goal 1, OT)  minimum assist (75% or more patient effort)  -AC      Time Frame (Dressing Goal 1, OT)  long term goal (LTG);10 days  -AC      Progress/Outcome (Dressing Goal 1, OT)  goal not met  -AC         Toileting Goal 1 (OT)    Activity/Device (Toileting Goal  1, OT)  toileting skills, all  -AC      [place] Level/Cues Needed (Toileting Goal 1, OT)  set-up required  -AC      Time Frame (Toileting Goal 1, OT)  long term goal (LTG);10 days  -AC      Progress/Outcome (Toileting Goal 1, OT)  goal not met  -AC        User Key  (r) = Recorded By, (t) = Taken By, (c) = Cosigned By    Initials Name Provider Type Discipline    Hans Wahl, OTR/L, MARTIN Occupational Therapist OT          Outcome Measures     Row Name 06/17/21 0859             How much help from another is currently needed...    Putting on and taking off regular lower body clothing?  2  -AC      Bathing (including washing, rinsing, and drying)  2  -AC      Toileting (which includes using toilet bed pan or urinal)  4  -AC      Putting on and taking off regular upper body clothing  4  -AC      Taking care of personal grooming (such as brushing teeth)  4  -AC      Eating meals  4  -AC      AM-PAC 6 Clicks Score (OT)  20  -AC         Functional Assessment    Outcome Measure Options  AM-PAC 6 Clicks Daily Activity (OT)  -AC        User Key  (r) = Recorded By, (t) = Taken By, (c) = Cosigned By    Initials Name Provider Type    Hans Wahl OTR/L, MARTIN Occupational Therapist          Time Calculation:   Time Calculation- OT     Row Name 06/17/21 0929             Time Calculation- OT    OT Start Time  0855  -AC      OT Stop Time  0929  -AC      OT Time Calculation (min)  34 min  -AC      OT Received On  06/17/21  -AC        User Key  (r) = Recorded By, (t) = Taken By, (c) = Cosigned By    Initials Name Provider Type    Hans Wahl OTR/L, MARTIN Occupational Therapist          Therapy Charges for Today     Code Description Service Date Service Provider Modifiers Qty    [id number]  OT RE-EVAL 2 6/17/2021 Hans Neville OTR/L, MARTIN GO 1               OT Discharge Summary  Anticipated Discharge Disposition (OT): home with assist  Reason for Discharge: Discharge from facility  Outcomes Achieved: Refer  to plan of care for updates on goals achieved  Discharge Destination: Home with assist    Hans Neville, OTR/L, CNT  6/17/2021

## 2021-06-17 NOTE — PLAN OF CARE
Goal Outcome Evaluation:  Plan of Care Reviewed With: patient        Progress: improving  Outcome Summary: OT reeval completed.  Pt reports he has been up independently to BR overnight and walked the halls independently.  He was Mark for log rolling and demonstrated sitting balance WNL at EOB.  Dons LSO with set up.  Dependent for LB dressing d/t pain.  Transfers with S and ambulates in hallway with SBA.  He simulated tub shower transfer in hallway with Isabel and UE support.  Suggested pt use walk in shower until he is feeling stronger.  Pt was educated on spinal precautions, body mechanics, home safety and pain mgmt.  Pt is ready for discharge home with family with no need for skilled OT.

## 2021-06-17 NOTE — THERAPY RE-EVALUATION
Patient Name: Jagdish Kulkarni  : 1975    MRN: [id number]                              Today's Date: 2021       Admit Date: 2021    Visit Dx:     ICD-10-CM ICD-9-CM   1. Chronic bilateral low back pain without sciatica  M54.5 724.2    G89.29 338.29   2. Decreased activities of daily living (ADL)  Z78.9 V49.89   3. Impaired mobility  Z74.09 799.89   4. DDD (degenerative disc disease), lumbar  M51.36 722.52     Patient Active Problem List   Diagnosis   • Essential hypertension   • Mixed hyperlipidemia   • Obesity   • Hyperglycemia   • DDD (degenerative disc disease), lumbar   • Chronic bilateral low back pain without sciatica   • Lumbar pain     Past Medical History:   Diagnosis Date   • Chronic bilateral low back pain without sciatica 2021   • Diabetes mellitus (CMS/HCC)     border line   • Elevated cholesterol    • GERD (gastroesophageal reflux disease)    • Hyperlipidemia    • Hypertension    • PONV (postoperative nausea and vomiting)      Past Surgical History:   Procedure Laterality Date   • CHOLECYSTECTOMY     • LUMBAR FUSION Right 2021    Procedure: RIGHT LATERAL LUMBAR INTERBODY FUSION WITH INSTRUMENTATION L3-4;  Surgeon: EDIE Castro MD;  Location: [facility];  Service: Orthopedic Spine;  Laterality: Right;   • LUMBAR SPINE SURGERY       General Information     Row Name 21 0848          Physical Therapy Time and Intention    Document Type  re-evaluation S/p:removal of instrumentation of fusion L L4-S1,Post spinal fusion L3-L4 with instrumentation L3-S1  -SB (r) HW (t) SB (c)     Mode of Treatment  physical therapy  -SB (r) HW (t) SB (c)     Row Name 21 0848          General Information    Patient Profile Reviewed  yes  -SB (r) HW (t) SB (c)     Existing Precautions/Restrictions  brace worn when out of bed;fall;spinal  -SB (r) HW (t) SB (c)     Row Name 21 0848          Cognition    Orientation Status (Cognition)  oriented x 4  -SB (r) HW (t) SB (c)     Row Name  06/17/21 0848          Safety Issues, Functional Mobility    Impairments Affecting Function (Mobility)  balance;pain;strength;range of motion (ROM)  -SB (r) HW (t) SB (c)       User Key  (r) = Recorded By, (t) = Taken By, (c) = Cosigned By    Initials Name Provider Type    Janey Salguero, PT DPT Physical Therapist    Katherine Patton, PT Student PT Student        Mobility     Row Name 06/17/21 0848          Bed Mobility    Bed Mobility  sidelying-sit;rolling left;sit-sidelying  -SB (r) HW (t) SB (c)     Rolling Left [place] (Bed Mobility)  independent  -SB (r) HW (t) SB (c)     Sidelying-Sit [place] (Bed Mobility)  modified independence  -SB (r) HW (t) SB (c)     Sit-Sidelying [place] (Bed Mobility)  --  -SB (r) HW (t) SB (c)     Assistive Device (Bed Mobility)  bed rails  -SB (r) HW (t) SB (c)     Comment (Bed Mobility)  pt left sitting EOB  -SB (r) HW (t) SB (c)     Row Name 06/17/21 0848          Sit-Stand Transfer    Sit-Stand [place] (Transfers)  independent  -SB (r) HW (t) SB (c)     Row Name 06/17/21 0848          Gait/Stairs (Locomotion)    [place] Level (Gait)  supervision  -SB (r) HW (t) SB (c)     Distance in Feet (Gait)  200  -SB (r) HW (t) SB (c)     Deviations/Abnormal Patterns (Gait)  gait speed decreased;other (see comments) weight shifting increased  -SB (r) HW (t) SB (c)       User Key  (r) = Recorded By, (t) = Taken By, (c) = Cosigned By    Initials Name Provider Type    Janey Salguero, PT DPT Physical Therapist    Katherine Patton, PT Student PT Student        Obj/Interventions     Row Name 06/17/21 0848          Range of Motion Comprehensive    General Range of Motion  bilateral lower extremity ROM WFL  -SB (r) HW (t) SB (c)     Comment, General Range of Motion  decreased B hip ER  -SB (r) HW (t) SB (c)     Row Name 06/17/21 0848          Strength Comprehensive (MMT)    Comment, General Manual Muscle Testing (MMT) Assessment  RLE 5/5, L hip flex, knee ext  4+/5 L ankle DF 5/5  -SB (r) HW (t) SB (c)     Row Name 06/17/21 0848          Balance    Balance Assessment  sitting static balance;sitting dynamic balance;standing static balance;standing dynamic balance  -SB (r) HW (t) SB (c)     Static Sitting Balance  WFL  -SB (r) HW (t) SB (c)     Dynamic Sitting Balance  WFL  -SB (r) HW (t) SB (c)     Static Standing Balance  WFL  -SB (r) HW (t) SB (c)     Dynamic Standing Balance  mild impairment;standing  -SB (r) HW (t) SB (c)     Comment, Balance  demos mild LOB during amb with head turns  -SB (r) HW (t) SB (c)     Row Name 06/17/21 0848          Sensory Assessment (Somatosensory)    Sensory Assessment (Somatosensory)  LE sensation intact  -SB (r) HW (t) SB (c)       User Key  (r) = Recorded By, (t) = Taken By, (c) = Cosigned By    Initials Name Provider Type    SB Janey Lozano, PT DPT Physical Therapist    HW Katherine Beckham, PT Student PT Student        Goals/Plan     Row Name 06/17/21 0937          Bed Mobility Goal 1 (PT)    Activity/Assistive Device (Bed Mobility Goal 1, PT)  bed mobility activities, all  -SB (r) HW (t) SB (c)     [place] Level/Cues Needed (Bed Mobility Goal 1, PT)  independent  -SB (r) HW (t) SB (c)     Time Frame (Bed Mobility Goal 1, PT)  long term goal (LTG)  -SB (r) HW (t) SB (c)     Progress/Outcomes (Bed Mobility Goal 1, PT)  goal met  -SB (r) HW (t) SB (c)     Row Name 06/17/21 0937          Transfer Goal 1 (PT)    Activity/Assistive Device (Transfer Goal 1, PT)  sit-to-stand/stand-to-sit;bed-to-chair/chair-to-bed;walker, rolling  -SB (r) HW (t) SB (c)     [place] Level/Cues Needed (Transfer Goal 1, PT)  supervision required  -SB (r) HW (t) SB (c)     Time Frame (Transfer Goal 1, PT)  long term goal (LTG)  -SB (r) HW (t) SB (c)     Progress/Outcome (Transfer Goal 1, PT)  goal met  -SB (r) HW (t) SB (c)     Row Name 06/17/21 0937          Gait Training Goal 1 (PT)    Activity/Assistive Device (Gait Training Goal 1, PT)  gait  (walking locomotion);assistive device use;decrease fall risk;diminish gait deviation;increase energy conservation;increase endurance/gait distance;improve balance and speed;forward stepping;normalize weight shifts;walker, rolling  -SB (r) HW (t) SB (c)     [place] Level (Gait Training Goal 1, PT)  standby assist;1 person assist  -SB (r) HW (t) SB (c)     Distance (Gait Training Goal 1, PT)  350  -SB (r) HW (t) SB (c)     Time Frame (Gait Training Goal 1, PT)  long term goal (LTG)  -SB (r) HW (t) SB (c)     Progress/Outcome (Gait Training Goal 1, PT)  goal met  -SB (r) HW (t) SB (c)       User Key  (r) = Recorded By, (t) = Taken By, (c) = Cosigned By    Initials Name Provider Type    Janey Salguero, PT DPT Physical Therapist    HW Katherine Beckham, PT Student PT Student        Clinical Impression     Row Name 06/17/21 0848          Pain    Additional Documentation  Pain Scale: Numbers Pre/Post-Treatment (Group)  -SB (r) HW (t) SB (c)     Row Name 06/17/21 0848          Pain Scale: Numbers Pre/Post-Treatment    Pretreatment Pain Rating  5/10  -SB (r) HW (t) SB (c)     Posttreatment Pain Rating  6/10  -SB (r) HW (t) SB (c)     Pain Location - Side  Right  -SB (r) HW (t) SB (c)     Pain Location - Orientation  lower  -SB (r) HW (t) SB (c)     Pain Location  back  -SB (r) HW (t) SB (c)     Pain Intervention(s)  Repositioned;Ambulation/increased activity  -SB (r) HW (t) SB (c)     Row Name 06/17/21 0848          Plan of Care Review    Plan of Care Reviewed With  patient  -SB (r) HW (t) SB (c)     Progress  improving  -SB (r) HW (t) SB (c)     Outcome Summary  PT re-eval completed. Pt AAOx4. Pt demos spinal prec w/ vc, rolling L Ind, sidelying<>sit Liya w/ bed rails, sit<>stand ind, and amb 200 ft w/ supervision. Mild LOB with head turns. Pt amb 6 stairs with handrail on L safely, step to pattern. R hip flex, knee ext 4+/5, R ankle DF 5/5, L hip flex, knee ext 4/5, L ankle DF 5/5. Pt demos limited B hip ER while  attempting to don socks. Pt states he feel confident to return home to [facility]. Pt returned to sitting EOB, call light in reach, wife present. Nsg notified of pt progress. Pt safe to d/c home with assist, no further skilled PT needed.  -SB (r) HW (t) SB (c)     Row Name 06/17/21 0848          Therapy Assessment/Plan (PT)    Patient/Family Therapy Goals Statement (PT)  return to PLOF  -SB (r) HW (t) SB (c)     Rehab Potential (PT)  --  -SB (r) HW (t) SB (c)     Criteria for Skilled Interventions Met (PT)  no problems identified which require skilled intervention;does not meet criteria for skilled intervention;no  -SB (r) HW (t) SB (c)     Predicted Duration of Therapy Intervention (PT)  --  -SB (r) HW (t) SB (c)     Row Name 06/17/21 0848          Vital Signs    O2 Delivery Pre Treatment  room air  -SB (r) HW (t) SB (c)     O2 Delivery Intra Treatment  room air  -SB (r) HW (t) SB (c)     O2 Delivery Post Treatment  room air  -SB (r) HW (t) SB (c)     Pre Patient Position  Supine  -SB (r) HW (t) SB (c)     Intra Patient Position  Standing  -SB (r) HW (t) SB (c)     Post Patient Position  Supine  -SB (r) HW (t) SB (c)     Row Name 06/17/21 0848          Positioning and Restraints    Pre-Treatment Position  in bed  -SB (r) HW (t) SB (c)     Post Treatment Position  bed  -SB (r) HW (t) SB (c)     In Bed  sitting EOB;with family/caregiver;side rails up x1;encouraged to call for assist;call light within reach  -SB (r) HW (t) SB (c)       User Key  (r) = Recorded By, (t) = Taken By, (c) = Cosigned By    Initials Name Provider Type    Janey Salguero, PT DPT Physical Therapist    Katherine Patton, PT Student PT Student        Outcome Measures     Row Name 06/17/21 0848          How much help from another person do you currently need...    Turning from your back to your side while in flat bed without using bedrails?  4  -SB (r) HW (t) SB (c)     Moving from lying on back to sitting on the side of a flat bed without  bedrails?  4  -SB (r) HW (t) SB (c)     Moving to and from a bed to a chair (including a wheelchair)?  4  -SB (r) HW (t) SB (c)     Standing up from a chair using your arms (e.g., wheelchair, bedside chair)?  4  -SB (r) HW (t) SB (c)     Climbing 3-5 steps with a railing?  4  -SB (r) HW (t) SB (c)     To walk in hospital room?  4  -SB (r) HW (t) SB (c)     AM-PAC 6 Clicks Score (PT)  24  -AD (r) HW (t)     Row Name 06/17/21 0859 06/17/21 0848       Functional Assessment    Outcome Measure Options  AM-PAC 6 Clicks Daily Activity (OT)  -AC  AM-PAC 6 Clicks Basic Mobility (PT)  -SB (r) HW (t) SB (c)      User Key  (r) = Recorded By, (t) = Taken By, (c) = Cosigned By    Initials Name Provider Type    Hans Wahl, OTR/L, CNT Occupational Therapist    Janey Salguero, PT DPT Physical Therapist    Sommer Kennedy, RN Registered Nurse    Katherine Patton, PT Student PT Student        Physical Therapy Education                 Title: PT OT SLP Therapies (In Progress)     Topic: Physical Therapy (Done)     Point: Mobility training (Done)     Learning Progress Summary           Patient Marcial, JOHN,E, DU by  at 6/17/2021 0948    Comment: Pt edu on spinal precautions and importane of donning brace with all OOB activity upon returning home this am.    Acceptance, E,TB, VU,DU by  at 6/14/2021 1430    Comment: Pt educated on spinal precautions including log rolling for bed mobility and proper donning/doffing of brace before OOB activity.   Family Acceptance, E,TB, VU,DU by  at 6/14/2021 1430    Comment: Pt educated on spinal precautions including log rolling for bed mobility and proper donning/doffing of brace before OOB activity.                   Point: Body mechanics (Done)     Learning Progress Summary           Patient Marcial, D,E, DU by  at 6/17/2021 0948    Comment: Pt edu on spinal precautions and importane of donning brace with all OOB activity upon returning home this am.    Acceptance, E,TB, VU,DU by HW  at 6/14/2021 1430    Comment: Pt educated on spinal precautions including log rolling for bed mobility and proper donning/doffing of brace before OOB activity.   Family Acceptance, E,TB, VU,DU by  at 6/14/2021 1430    Comment: Pt educated on spinal precautions including log rolling for bed mobility and proper donning/doffing of brace before OOB activity.                   Point: Precautions (Done)     Learning Progress Summary           Patient Eager, D,E, DU by  at 6/17/2021 0948    Comment: Pt edu on spinal precautions and importane of donning brace with all OOB activity upon returning home this am.    Acceptance, E,TB, VU,DU by  at 6/14/2021 1430    Comment: Pt educated on spinal precautions including log rolling for bed mobility and proper donning/doffing of brace before OOB activity.   Family Acceptance, E,TB, VU,DU by  at 6/14/2021 1430    Comment: Pt educated on spinal precautions including log rolling for bed mobility and proper donning/doffing of brace before OOB activity.                               User Key     Initials Effective Dates Name Provider Type Discipline     04/22/21 -  Katherine Beckham, PT Student PT Student PT              PT Recommendation and Plan  Planned Therapy Interventions (PT): balance training, bed mobility training, gait training, postural re-education, patient/family education, strengthening, transfer training, ROM (range of motion)  Plan of Care Reviewed With: patient  Progress: improving  Outcome Summary: PT re-eval completed. Pt AAOx4. Pt demos spinal prec w/ vc, rolling L Ind, sidelying<>sit Liya w/ bed rails, sit<>stand ind, and amb 200 ft w/ supervision. Mild LOB with head turns. Pt amb 6 stairs with handrail on L safely, step to pattern. R hip flex, knee ext 4+/5, R ankle DF 5/5, L hip flex, knee ext 4/5, L ankle DF 5/5. Pt demos limited B hip ER while attempting to don socks. Pt states he feel confident to return home to PLOF. Pt returned to sitting EOB, call  light in reach, wife present. Nsg notified of pt progress. Pt safe to d/c home with assist, no further skilled PT needed.     Time Calculation:   PT Charges     Row Name 06/17/21 0949             Time Calculation    Start Time  0848  -SB (r) HW (t) SB (c)      Stop Time  0920 add 15 min chart review from 6/16/2021 for a total of 47 min  -SB (r) HW (t) SB (c)      Time Calculation (min)  32 min  -SB (r) HW (t)      PT Received On  06/17/21  -SB (r) HW (t) SB (c)         Timed Charges    36560 - Gait Training Minutes   10  -SB (r) HW (t) SB (c)         Total Minutes    Timed Charges Total Minutes  10  -SB (r) HW (t)       Total Minutes  10  -SB (r) HW (t)        User Key  (r) = Recorded By, (t) = Taken By, (c) = Cosigned By    Initials Name Provider Type    Janey Salguero, PT DPT Physical Therapist    Katherine Patton, PT Student PT Student            PT G-Codes  Outcome Measure Options: AM-PAC 6 Clicks Daily Activity (OT)  AM-PAC 6 Clicks Score (PT): 24  AM-PAC 6 Clicks Score (OT): 20    Katherine eBckham PT Student  6/17/2021

## 2021-06-17 NOTE — DISCHARGE SUMMARY
[facility]  SPINE SURGERY  LUCIANO Real  DISCHARGE SUMMARY  Patient ID:  Jagdish Kulkarni  [id number]  45 y.o.  1975    Admit date: 6/14/2021    Discharge date and time: 6/17/2021    Admitting Physician: EDIE Castro MD     Indication for Admission: patient admitted for staged surgical procedure     Admission Diagnoses: Lumbar pain [M54.5]    Discharge Diagnoses: Lumbar pain [M54.5]    Procedures: lateral lumbar fusion L3/L4, removal of instrumentation L4-S1, posterior spinal fusion L3/L4    Problem List:   DDD (degenerative disc disease), lumbar    Chronic bilateral low back pain without sciatica    Lumbar pain      Consults: none    Admission Condition: stable    Discharged Condition: stable    Hospital Course: no immediate post surgical complication     Disposition: home    Patient Instructions:      Discharge Medications      ASK your doctor about these medications      Instructions Start Date   cetirizine 10 MG tablet  Commonly known as: zyrTEC   TAKE ONE TABLET BY MOUTH EVERY DAY      DULoxetine 30 MG capsule  Commonly known as: CYMBALTA   1 capsule, Daily      gabapentin 600 MG tablet  Commonly known as: NEURONTIN   600 mg, Oral, 3 Times Daily PRN      HYDROcodone-acetaminophen  MG per tablet  Commonly known as: NORCO    tablets, Oral, 3 Times Daily PRN      lisinopril 5 MG tablet  Commonly known as: PRINIVIL,ZESTRIL   5 mg, Oral, Daily      meclizine 25 MG tablet  Commonly known as: ANTIVERT   25 mg, Oral, 3 Times Daily PRN      metFORMIN 500 MG tablet  Commonly known as: GLUCOPHAGE   500 mg, Oral, Daily With Breakfast      naproxen sodium 220 MG tablet  Commonly known as: ALEVE   220 mg, Oral, 2 Times Daily PRN      omeprazole 20 MG capsule  Commonly known as: priLOSEC   TAKE ONE CAPSULE BY MOUTH EVERY DAY      simvastatin 20 MG tablet  Commonly known as: ZOCOR   20 mg, Oral, Daily      tiZANidine 4 MG tablet  Commonly known as: ZANAFLEX   4  mg, Oral, Every 8 Hours PRN           Activity: Avoid bending lifting or twisting, brace when up and out of bed, no driving while taking narcotic pain medication, walk 15 minutes 4 times daily  Diet: regular diet  Wound Care: keep wound clean and dry  Other: Contact [facility] office with questions or concerns    Follow-up with Dr Castro or PA's in 2 weeks.    Electronically signed by LUCIANO Real 07:26 CDT 6/17/2021

## 2021-06-17 NOTE — PLAN OF CARE
Goal Outcome Evaluation:  Plan of Care Reviewed With: patient        Progress: improving  Outcome Summary: PT re-eval completed. Pt AAOx4. Pt demos spinal prec w/ vc, rolling L Ind, sidelying<>sit Liya w/ bed rails, sit<>stand ind, and amb 200 ft w/ supervision. Mild LOB with head turns. Pt amb 6 stairs with handrail on L safely, step to pattern. R hip flex, knee ext 4+/5, R ankle DF 5/5, L hip flex, knee ext 4/5, L ankle DF 5/5. Pt demos limited B hip ER while attempting to don socks. Pt states he feel confident to return home to [facility]. Pt returned to sitting EOB, call light in reach, wife present. Nsg notified of pt progress. Pt safe to d/c home with assist, no further skilled PT needed.

## 2021-06-17 NOTE — PLAN OF CARE
Goal Outcome Evaluation:  Plan of Care Reviewed With: patient        Progress: improving  Outcome Summary: Patient received lying in bed awake alert and oriented. VS taken stable. Medicated X2 for pain with good results. Ambulated with assistance in hallway tolerated well. Condition stable. in no acute distress at this time will continue to monitor.

## 2021-06-18 ENCOUNTER — TRANSITIONAL CARE MANAGEMENT TELEPHONE ENCOUNTER (OUTPATIENT)
Dept: CALL CENTER | Facility: HOSPITAL | Age: 46
End: 2021-06-18

## 2021-06-18 NOTE — OUTREACH NOTE
March 3, 2021     Patient: Xavi Hernandez   YOB: 2017       To Whom it May Concern:    Xavi Hernandez has multiple sensory issues; He will only eat dry foods.  He will not eat anything that is wet or soggy.  He is undergoing evaluation for possible autism spectrum disorder. He will eat dry cereal.  He will also eat chicken nuggets and apples.  Please substitute cow's milk with almond milk.  Please allow patient's mom to bring in outside food, since Xavi will not eat most of the food on the menu.    If you have any questions or concerns, please don't hesitate to call.      Sincerely,           Binta Olmedo MD    Medical information is confidential and cannot be disclosed without the written consent of the patient or his representative.       Prep Survey      Responses   [facility] facility patient discharged from?  [place]   Is LACE score < 7 ?  Yes   Emergency Room discharge w/ pulse ox?  No   Eligibility  [facility]   Date of Admission  06/14/21   Date of Discharge  06/17/21   Discharge Disposition  Home or Self Care   Discharge diagnosis  LUMBAR LAMINECTOMY WITH FUSION   Does the patient have one of the following disease processes/diagnoses(primary or secondary)?  General Surgery   Does the patient have Home health ordered?  No   Is there a DME ordered?  No   Prep survey completed?  Yes          Haylie Mclean RN

## 2021-06-18 NOTE — OUTREACH NOTE
Call Center TCM Note      Responses   [facility] patient discharged from?  [place]   Does the patient have one of the following disease processes/diagnoses(primary or secondary)?  General Surgery   TCM attempt successful?  No [verbal consent ]   Unsuccessful attempts  Attempt 1          Guerline Barclay RN    2021, 09:08 EDT

## 2021-06-18 NOTE — OUTREACH NOTE
Call Center TCM Note      Responses   [facility] patient discharged from?  [place]   Does the patient have one of the following disease processes/diagnoses(primary or secondary)?  General Surgery   TCM attempt successful?  Yes   Call start time  1057   Discharge diagnosis  LUMBAR LAMINECTOMY WITH FUSION   Person spoke with today (if not patient) and relationship  Patient   Meds reviewed with patient/caregiver?  Yes   Is the patient having any side effects they believe may be caused by any medication additions or changes?  No   Does the patient have all medications related to this admission filled (includes all antibiotics, pain medications, etc.)  Yes   Is the patient taking all medications as directed (includes completed medication regime)?  Yes   Does the patient have a follow up appointment scheduled with their surgeon?  Yes   Has the patient kept scheduled appointments due by today?  N/A   Did the patient receive a copy of their discharge instructions?  Yes   Nursing interventions  Reviewed instructions with patient   What is the patient's perception of their health status since discharge?  Improving   Nursing interventions  Nurse provided patient education   Is the patient /caregiver able to teach back basic post-op care?  Practice 'cough and deep breath', Continue use of incentive spirometry at least 1 week post discharge, No tub bath, swimming, or hot tub until instructed by MD, Take showers only when approved by MD-sponge bathe until then, Drive as instructed by MD in discharge instructions, Keep incision areas clean,dry and protected, Do not remove steri-strips, Lifting as instructed by MD in discharge instructions   Is the patient/caregiver able to teach back signs and symptoms of incisional infection?  Increased redness, swelling or pain at the incisonal site, Increased drainage or bleeding, Incisional warmth, Pus or odor from incision, Fever   Is the patient/caregiver able to teach back steps to  recovery at home?  Rest and rebuild strength, gradually increase activity, Set small, achievable goals for return to baseline health, Eat a well-balance diet   If the patient is a current smoker, are they able to teach back resources for cessation?  Not a smoker   Is the patient/caregiver able to teach back the hierarchy of who to call/visit for symptoms/problems? PCP, Specialist, Home health nurse, Urgent Care, ED, 911  Yes          Guerline Barclay RN    6/18/2021, 11:05 EDT

## 2021-06-22 ENCOUNTER — TELEPHONE (OUTPATIENT)
Dept: FAMILY MEDICINE CLINIC | Facility: CLINIC | Age: 46
End: 2021-06-22

## 2021-06-22 DIAGNOSIS — R11.2 NAUSEA AND VOMITING IN ADULT: Primary | ICD-10-CM

## 2021-06-22 RX ORDER — ONDANSETRON 4 MG/1
4 TABLET, ORALLY DISINTEGRATING ORAL EVERY 8 HOURS PRN
Qty: 30 TABLET | Refills: 0 | Status: SHIPPED | OUTPATIENT
Start: 2021-06-22 | End: 2022-01-04 | Stop reason: SDUPTHER

## 2021-06-22 NOTE — TELEPHONE ENCOUNTER
Patient called and stated he had two back surgeries last week, the first surgery last Monday and the second surgery last Wednesday. He is nauseated and is asking for Zofran to help. Patient uses [facility].

## 2021-06-30 ENCOUNTER — OFFICE VISIT (OUTPATIENT)
Dept: FAMILY MEDICINE CLINIC | Facility: CLINIC | Age: 46
End: 2021-06-30

## 2021-06-30 VITALS
DIASTOLIC BLOOD PRESSURE: 75 MMHG | SYSTOLIC BLOOD PRESSURE: 129 MMHG | HEART RATE: 61 BPM | BODY MASS INDEX: 35.78 KG/M2 | HEIGHT: 69 IN | TEMPERATURE: 98 F | WEIGHT: 241.6 LBS | OXYGEN SATURATION: 98 %

## 2021-06-30 DIAGNOSIS — M54.16 LEFT LUMBAR RADICULOPATHY: ICD-10-CM

## 2021-06-30 DIAGNOSIS — M51.36 DDD (DEGENERATIVE DISC DISEASE), LUMBAR: Primary | ICD-10-CM

## 2021-06-30 DIAGNOSIS — F51.01 PRIMARY INSOMNIA: ICD-10-CM

## 2021-06-30 DIAGNOSIS — G89.29 CHRONIC BILATERAL LOW BACK PAIN WITH BILATERAL SCIATICA: ICD-10-CM

## 2021-06-30 DIAGNOSIS — M54.41 CHRONIC BILATERAL LOW BACK PAIN WITH BILATERAL SCIATICA: ICD-10-CM

## 2021-06-30 DIAGNOSIS — I10 ESSENTIAL HYPERTENSION: ICD-10-CM

## 2021-06-30 DIAGNOSIS — E66.01 CLASS 2 SEVERE OBESITY WITH SERIOUS COMORBIDITY AND BODY MASS INDEX (BMI) OF 37.0 TO 37.9 IN ADULT, UNSPECIFIED OBESITY TYPE (HCC): ICD-10-CM

## 2021-06-30 DIAGNOSIS — M54.42 CHRONIC BILATERAL LOW BACK PAIN WITH BILATERAL SCIATICA: ICD-10-CM

## 2021-06-30 PROBLEM — M47.26 OSTEOARTHRITIS OF SPINE WITH RADICULOPATHY, LUMBAR REGION: Status: ACTIVE | Noted: 2017-05-16

## 2021-06-30 PROCEDURE — 99214 OFFICE O/P EST MOD 30 MIN: CPT | Performed by: FAMILY MEDICINE

## 2021-06-30 RX ORDER — TRAZODONE HYDROCHLORIDE 50 MG/1
50 TABLET ORAL NIGHTLY PRN
Qty: 20 TABLET | Refills: 0 | Status: SHIPPED | OUTPATIENT
Start: 2021-06-30

## 2021-06-30 NOTE — PATIENT INSTRUCTIONS
"[email or web address].[email or web address]">   Chronic Back Pain  When back pain lasts longer than 3 months, it is called chronic back pain. The cause of your back pain may not be known. Some common causes include:  · Wear and tear (degenerative disease) of the bones, ligaments, or disks in your back.  · Inflammation and stiffness in your back (arthritis).  People who have chronic back pain often go through certain periods in which the pain is more intense (flare-ups). Many people can learn to manage the pain with home care.  Follow these instructions at home:  Pay attention to any changes in your symptoms. Take these actions to help with your pain:  Managing pain and stiffness         · If directed, apply ice to the painful area. Your health care provider may recommend applying ice during the first 24-48 hours after a flare-up begins. To do this:  ? Put ice in a plastic bag.  ? Place a towel between your skin and the bag.  ? Leave the ice on for 20 minutes, 2-3 times per day.  · If directed, apply heat to the affected area as often as told by your health care provider. Use the heat source that your health care provider recommends, such as a moist heat pack or a heating pad.  ? Place a towel between your skin and the heat source.  ? Leave the heat on for 20-30 minutes.  ? Remove the heat if your skin turns bright red. This is especially important if you are unable to feel pain, heat, or cold. You may have a greater risk of getting burned.  · Try soaking in a warm tub.  Activity    · Avoid bending and other activities that make the problem worse.  · Maintain a proper position when standing or sitting:  ? When standing, keep your upper back and neck straight, with your shoulders pulled back. Avoid slouching.  ? When sitting, keep your back straight and relax your shoulders. Do not round your shoulders or pull them backward.  · Do not sit or  one place for long periods of time.  · Take brief periods of rest " throughout the day. This will reduce your pain. Resting in a lying or standing position is usually better than sitting to rest.  · When you are resting for longer periods, mix in some mild activity or stretching between periods of rest. This will help to prevent stiffness and pain.  · Get regular exercise. Ask your health care provider what activities are safe for you.  · Do not lift anything that is heavier than 10 lb (4.5 kg), or the limit that you are told, until your health care provider says that it is safe. Always use proper lifting technique, which includes:  ? Bending your knees.  ? Keeping the load close to your body.  ? Avoiding twisting.  · Sleep on a firm mattress in a comfortable position. Try lying on your side with your knees slightly bent. If you lie on your back, put a pillow under your knees.  Medicines  · Treatment may include medicines for pain and inflammation taken by mouth or applied to the skin, prescription pain medicine, or muscle relaxants. Take over-the-counter and prescription medicines only as told by your health care provider.  · Ask your health care provider if the medicine prescribed to you:  ? Requires you to avoid driving or using machinery.  ? Can cause constipation. You may need to take these actions to prevent or treat constipation:  § Drink enough fluid to keep your urine pale yellow.  § Take over-the-counter or prescription medicines.  § Eat foods that are high in fiber, such as beans, whole grains, and fresh fruits and vegetables.  § Limit foods that are high in fat and processed sugars, such as fried or sweet foods.  General instructions  · Do not use any products that contain nicotine or tobacco, such as cigarettes, e-cigarettes, and chewing tobacco. If you need help quitting, ask your health care provider.  · Keep all follow-up visits as told by your health care provider. This is important.  Contact a health care provider if:  · You have pain that is not relieved with rest  or medicine.  · Your pain gets worse, or you have new pain.  · You have a high fever.  · You have rapid weight loss.  · You have trouble doing your normal activities.  Get help right away if:  · You have weakness or numbness in one or both of your legs or feet.  · You have trouble controlling your bladder or your bowels.  · You have severe back pain and have any of the following:  ? Nausea or vomiting.  ? Pain in your abdomen.  ? Shortness of breath or you faint.  Summary  · Chronic back pain is back pain that lasts longer than 3 months.  · When a flare-up begins, apply ice to the painful area for the first 24-48 hours.  · Apply a moist heat pad or use a heating pad on the painful area as directed by your health care provider.  · When you are resting for longer periods, mix in some mild activity or stretching between periods of rest. This will help to prevent stiffness and pain.  This information is not intended to replace advice given to you by your health care provider. Make sure you discuss any questions you have with your health care provider.  Document Revised: 01/27/2021 Document Reviewed: 01/27/2021  [employer] Patient Education © 2021 [employer]      Calorie Counting for Weight Loss  Calories are units of energy. Your body needs a certain number of calories from food to keep going throughout the day. When you eat or drink more calories than your body needs, your body stores the extra calories mostly as fat. When you eat or drink fewer calories than your body needs, your body burns fat to get the energy it needs.  Calorie counting means keeping track of how many calories you eat and drink each day. Calorie counting can be helpful if you need to lose weight. If you eat fewer calories than your body needs, you should lose weight. Ask your health care provider what a healthy weight is for you.  For calorie counting to work, you will need to eat the right number of calories each day to lose a healthy amount of  weight per week. A dietitian can help you figure out how many calories you need in a day and will suggest ways to reach your calorie goal.  · A healthy amount of weight to lose each week is usually 1-2 lb (0.5-0.9 kg). This usually means that your daily calorie intake should be reduced by 500-750 calories.  · Eating 1,200-1,500 calories a day can help most women lose weight.  · Eating 1,500-1,800 calories a day can help most men lose weight.  What do I need to know about calorie counting?  Work with your health care provider or dietitian to determine how many calories you should get each day. To meet your daily calorie goal, you will need to:  · Find out how many calories are in each food that you would like to eat. Try to do this before you eat.  · Decide how much of the food you plan to eat.  · Keep a food log. Do this by writing down what you ate and how many calories it had.  To successfully lose weight, it is important to balance calorie counting with a healthy lifestyle that includes regular activity.  Where do I find calorie information?    The number of calories in a food can be found on a Nutrition Facts label. If a food does not have a Nutrition Facts label, try to look up the calories online or ask your dietitian for help.  Remember that calories are listed per serving. If you choose to have more than one serving of a food, you will have to multiply the calories per serving by the number of servings you plan to eat. For example, the label on a package of bread might say that a serving size is 1 slice and that there are 90 calories in a serving. If you eat 1 slice, you will have eaten 90 calories. If you eat 2 slices, you will have eaten 180 calories.  How do I keep a food log?  After each time that you eat, record the following in your food log as soon as possible:  · What you ate. Be sure to include toppings, sauces, and other extras on the food.  · How much you ate. This can be measured in cups, ounces,  or number of items.  · How many calories were in each food and drink.  · The total number of calories in the food you ate.  Keep your food log near you, such as in a pocket-sized notebook or on an issac or website on your mobile phone. Some programs will calculate calories for you and show you how many calories you have left to meet your daily goal.  What are some portion-control tips?  · Know how many calories are in a serving. This will help you know how many servings you can have of a certain food.  · Use a measuring cup to measure serving sizes. You could also try weighing out portions on a kitchen scale. With time, you will be able to estimate serving sizes for some foods.  · Take time to put servings of different foods on your favorite plates or in your favorite bowls and cups so you know what a serving looks like.  · Try not to eat straight from a food's packaging, such as from a bag or box. Eating straight from the package makes it hard to see how much you are eating and can lead to overeating. Put the amount you would like to eat in a cup or on a plate to make sure you are eating the right portion.  · Use smaller plates, glasses, and bowls for smaller portions and to prevent overeating.  · Try not to multitask. For example, avoid watching TV or using your computer while eating. If it is time to eat, sit down at a table and enjoy your food. This will help you recognize when you are full. It will also help you be more mindful of what and how much you are eating.  What are tips for following this plan?  Reading food labels  · Check the calorie count compared with the serving size. The serving size may be smaller than what you are used to eating.  · Check the source of the calories. Try to choose foods that are high in protein, fiber, and vitamins, and low in saturated fat, trans fat, and sodium.  Shopping  · Read nutrition labels while you shop. This will help you make healthy decisions about which foods to  buy.  · Pay attention to nutrition labels for low-fat or fat-free foods. These foods sometimes have the same number of calories or more calories than the full-fat versions. They also often have added sugar, starch, or salt to make up for flavor that was removed with the fat.  · Make a grocery list of lower-calorie foods and stick to it.  Cooking  · Try to cook your favorite foods in a healthier way. For example, try baking instead of frying.  · Use low-fat dairy products.  Meal planning  · Use more fruits and vegetables. One-half of your plate should be fruits and vegetables.  · Include lean proteins, such as chicken, turkey, and fish.  Lifestyle  Each week, aim to do one of the following:  · 150 minutes of moderate exercise, such as walking.  · 75 minutes of vigorous exercise, such as running.  General information  · Know how many calories are in the foods you eat most often. This will help you calculate calorie counts faster.  · Find a way of tracking calories that works for you. Get creative. Try different apps or programs if writing down calories does not work for you.  What foods should I eat?    · Eat nutritious foods. It is better to have a nutritious, high-calorie food, such as an avocado, than a food with few nutrients, such as a bag of potato chips.  · Use your calories on foods and drinks that will fill you up and will not leave you hungry soon after eating.  ? Examples of foods that fill you up are nuts and nut butters, vegetables, lean proteins, and high-fiber foods such as whole grains. High-fiber foods are foods with more than 5 g of fiber per serving.  · Pay attention to calories in drinks. Low-calorie drinks include water and unsweetened drinks.  The items listed above may not be a complete list of foods and beverages you can eat. Contact a dietitian for more information.  What foods should I limit?  Limit foods or drinks that are not good sources of vitamins, minerals, or protein or that are high  in unhealthy fats. These include:  · Candy.  · Other sweets.  · Sodas, specialty coffee drinks, alcohol, and juice.  The items listed above may not be a complete list of foods and beverages you should avoid. Contact a dietitian for more information.  How do I count calories when eating out?  · Pay attention to portions. Often, portions are much larger when eating out. Try these tips to keep portions smaller:  ? Consider sharing a meal instead of getting your own.  ? If you get your own meal, eat only half of it. Before you start eating, ask for a container and put half of your meal into it.  ? When available, consider ordering smaller portions from the menu instead of full portions.  · Pay attention to your food and drink choices. Knowing the way food is cooked and what is included with the meal can help you eat fewer calories.  ? If calories are listed on the menu, choose the lower-calorie options.  ? Choose dishes that include vegetables, fruits, whole grains, low-fat dairy products, and lean proteins.  ? Choose items that are boiled, broiled, grilled, or steamed. Avoid items that are buttered, battered, fried, or served with cream sauce. Items labeled as crispy are usually fried, unless stated otherwise.  ? Choose water, low-fat milk, unsweetened iced tea, or other drinks without added sugar. If you want an alcoholic beverage, choose a lower-calorie option, such as a glass of wine or light beer.  ? Ask for dressings, sauces, and syrups on the side. These are usually high in calories, so you should limit the amount you eat.  ? If you want a salad, choose a garden salad and ask for grilled meats. Avoid extra toppings such as peres, cheese, or fried items. Ask for the dressing on the side, or ask for olive oil and vinegar or lemon to use as dressing.  · Estimate how many servings of a food you are given. Knowing serving sizes will help you be aware of how much food you are eating at restaurants.  Where to find more  information  · Centers for Disease Control and Prevention: www.cdc.gov  · [place] Department of Agriculture: myplate.gov  Summary  · Calorie counting means keeping track of how many calories you eat and drink each day. If you eat fewer calories than your body needs, you should lose weight.  · A healthy amount of weight to lose per week is usually 1-2 lb (0.5-0.9 kg). This usually means reducing your daily calorie intake by 500-750 calories.  · The number of calories in a food can be found on a Nutrition Facts label. If a food does not have a Nutrition Facts label, try to look up the calories online or ask your dietitian for help.  · Use smaller plates, glasses, and bowls for smaller portions and to prevent overeating.  · Use your calories on foods and drinks that will fill you up and not leave you hungry shortly after a meal.  This information is not intended to replace advice given to you by your health care provider. Make sure you discuss any questions you have with your health care provider.  Document Revised: 01/28/2021 Document Reviewed: 01/28/2021  [employer] Patient Education © 2021 [employer]

## 2021-06-30 NOTE — PROGRESS NOTES
[facility]  TRANSITIONAL CARE MANAGEMENT VISIT         DISCHARGING PHYSICIAN: cher Castro  DISCHARGE DATE: 6/14/21-6/17/21  DISCHARGE from FACILITY: [facility]    DISCHARGE DIAGNOSES:  There are no active hospital problems to display for this patient.      Was admitted by Dr Castro to [facility] for lateral lumbar fusion L3/L4, removal of instrumentation L4-S1, posterior spinal fusion L3/L4 on 6/14 and 6/16/21. Due to chronic lumbar DDD and DJD and radiculopathy. Doing better, less pain. Due to see Dr Castro in FU tomorrow.   Having a little trouble sleeping recently - will try some trazodone.        INITIAL TCM Phone Encounter was made on 6/18/21, and documented in the record.    FACE-to-FACE evaluation performed on 06/30/2021.  This was within within 14 (Medium Complexity) days of discharge.    MEDICATION RECONCILIATION: Medication list was reviewed and reconciled, and new list provided to patient/family/caregiver via AVS.    ALLERGIES:  Other and Oxycontin [oxycodone]    CURRENT MEDICATION LIST:    Current Outpatient Medications:   •  cetirizine (zyrTEC) 10 MG tablet, TAKE ONE TABLET BY MOUTH EVERY DAY (Patient taking differently: 10 mg Daily As Needed.), Disp: 30 tablet, Rfl: 2  •  DULoxetine (CYMBALTA) 30 MG capsule, Take 1 capsule by mouth Daily., Disp: 30 capsule, Rfl: 2  •  gabapentin (NEURONTIN) 600 MG tablet, Take 1 tablet by mouth 3 (Three) Times a Day., Disp: 90 tablet, Rfl: 0  •  HYDROcodone-acetaminophen (NORCO)  MG per tablet, Take 1 tablet by mouth Every 6 (Six) Hours As Needed for Moderate Pain  or Severe Pain ., Disp: 60 tablet, Rfl: 0  •  lisinopril (PRINIVIL,ZESTRIL) 5 MG tablet, Take 1 tablet by mouth Daily., Disp: 90 tablet, Rfl: 1  •  meclizine (ANTIVERT) 25 MG tablet, Take 1 tablet by mouth 3 (Three) Times a Day As Needed for Dizziness., Disp: 30 tablet, Rfl: 0  •  metFORMIN (GLUCOPHAGE) 500 MG tablet, Take 1 tablet by mouth Daily With  "Breakfast., Disp: 90 tablet, Rfl: 1  •  omeprazole (priLOSEC) 20 MG capsule, TAKE ONE CAPSULE BY MOUTH EVERY DAY, Disp: 90 capsule, Rfl: 1  •  ondansetron ODT (Zofran ODT) 4 MG disintegrating tablet, Place 1 tablet on the tongue Every 8 (Eight) Hours As Needed for Nausea or Vomiting., Disp: 30 tablet, Rfl: 0  •  sennosides-docusate (PERICOLACE) 8.6-50 MG per tablet, Take 1 tablet by mouth 2 (Two) Times a Day., Disp: 60 tablet, Rfl: 0  •  simvastatin (ZOCOR) 20 MG tablet, Take 1 tablet by mouth Daily., Disp: 90 tablet, Rfl: 1  •  tiZANidine (ZANAFLEX) 4 MG tablet, Take 4 mg by mouth Every 8 (Eight) Hours As Needed., Disp: , Rfl:   •  traZODone (DESYREL) 50 MG tablet, Take 1 tablet by mouth At Night As Needed for Sleep., Disp: 20 tablet, Rfl: 0    ROS:  Review of Systems   Constitutional: Negative for activity change, appetite change, fatigue, fever, unexpected weight gain and unexpected weight loss.   Respiratory: Negative for shortness of breath.    Cardiovascular: Negative for chest pain.   Gastrointestinal: Negative for abdominal pain.   Genitourinary: Negative for difficulty urinating.   Musculoskeletal: Positive for arthralgias and back pain.   Skin: Negative for rash.   Neurological: Negative for syncope and headache.       PATIENT EXAM:  Vital Signs:  /75 (BP Location: Right arm, Patient Position: Sitting, Cuff Size: Large Adult)   Pulse 61   Temp 98 °F (36.7 °C)   Ht 175.3 cm (69\") Comment: pt tolerated  Wt 110 kg (241 lb 9.6 oz)   SpO2 98%   BMI 35.68 kg/m²   Physical Exam  Vitals and nursing note reviewed.   Constitutional:       General: He is not in acute distress.     Appearance: He is well-developed.   HENT:      Head: Normocephalic and atraumatic.      Mouth/Throat:      Mouth: Mucous membranes are moist.      Pharynx: Oropharynx is clear.   Eyes:      Extraocular Movements: Extraocular movements intact.      Pupils: Pupils are equal, round, and reactive to light.   Neck:      Vascular: No " JVD.   Cardiovascular:      Rate and Rhythm: Normal rate and regular rhythm.      Pulses: Normal pulses.      Heart sounds: Normal heart sounds.   Pulmonary:      Effort: Pulmonary effort is normal. No respiratory distress.      Breath sounds: Normal breath sounds.   Abdominal:      General: Bowel sounds are normal. There is no distension.      Palpations: Abdomen is soft.      Tenderness: There is no abdominal tenderness.   Musculoskeletal:         General: Normal range of motion.      Cervical back: Normal range of motion and neck supple.   Skin:     General: Skin is warm and dry.      Capillary Refill: Capillary refill takes less than 2 seconds.      Findings: No rash.   Neurological:      General: No focal deficit present.      Mental Status: He is alert and oriented to person, place, and time.      Cranial Nerves: No cranial nerve deficit.   Psychiatric:         Mood and Affect: Mood normal.         Behavior: Behavior normal.          ASSESSMENT:  Diagnoses and all orders for this visit:    1. DDD (degenerative disc disease), lumbar (Primary)    2. Left lumbar radiculopathy    3. Essential hypertension    4. Class 2 severe obesity with serious comorbidity and body mass index (BMI) of 37.0 to 37.9 in adult, unspecified obesity type (CMS/HCC)    5. Chronic bilateral low back pain with bilateral sciatica    6. Primary insomnia  -     traZODone (DESYREL) 50 MG tablet; Take 1 tablet by mouth At Night As Needed for Sleep.  Dispense: 20 tablet; Refill: 0          REFERRALS:  Orthopedic Surgery       LABS and/or ADDITIONAL TESTS NEEDED:  No orders of the defined types were placed in this encounter.      DURABLE MEDICAL EQUIPMENT (DME):  None Needed    COMMUNITY RESOURCES IDENTIFIED FOR PATIENT/FAMILY:  None Needed.    ADDITIONAL COMMUNICATION DELIVERED OR PLANNED:  Please refer to AVS for specifics of Patient Education, Updated Medication List, Handouts, Referrals. As necessary, information was made available or sent  to Specialists or members of Care Team.    COMPLEXITY OF MEDICAL DECISION MAKING:  Moderate Complexity (94943)  (Considerations: Number of diagnoses, number of management options considered, amount and/or complexity of medical records/diagnostic tests, and/or other information obtained/reviewed/analyzed. Risk of significant complications, morbidity, and/or mortality as well as co-morbidities associated with presenting problems, diagnostic procedures, and/or the possible management options).

## 2021-08-24 NOTE — PROGRESS NOTES
Patient is scheduled to complete on 8/26/21 before his appt on 9/2/21. Called and left patient a reminder on vm advising of appt.

## 2021-08-26 ENCOUNTER — CLINICAL SUPPORT (OUTPATIENT)
Dept: FAMILY MEDICINE CLINIC | Facility: CLINIC | Age: 46
End: 2021-08-26

## 2021-08-26 DIAGNOSIS — R73.03 PRE-DIABETES: ICD-10-CM

## 2021-08-26 DIAGNOSIS — R73.9 HYPERGLYCEMIA: Primary | ICD-10-CM

## 2021-08-31 ENCOUNTER — CLINICAL SUPPORT (OUTPATIENT)
Dept: FAMILY MEDICINE CLINIC | Facility: CLINIC | Age: 46
End: 2021-08-31

## 2021-09-01 LAB
ALBUMIN SERPL-MCNC: 4.4 G/DL (ref 4–5)
ALBUMIN/GLOB SERPL: 1.6 {RATIO} (ref 1.2–2.2)
ALP SERPL-CCNC: 86 IU/L (ref 48–121)
ALT SERPL-CCNC: 46 IU/L (ref 0–44)
AST SERPL-CCNC: 30 IU/L (ref 0–40)
BILIRUB SERPL-MCNC: 0.5 MG/DL (ref 0–1.2)
BUN SERPL-MCNC: 15 MG/DL (ref 6–24)
BUN/CREAT SERPL: 18 (ref 9–20)
CALCIUM SERPL-MCNC: 9.6 MG/DL (ref 8.7–10.2)
CHLORIDE SERPL-SCNC: 102 MMOL/L (ref 96–106)
CO2 SERPL-SCNC: 23 MMOL/L (ref 20–29)
CREAT SERPL-MCNC: 0.85 MG/DL (ref 0.76–1.27)
GLOBULIN SER CALC-MCNC: 2.7 G/DL (ref 1.5–4.5)
GLUCOSE SERPL-MCNC: 112 MG/DL (ref 65–99)
HBA1C MFR BLD: 5.8 % (ref 4.8–5.6)
POTASSIUM SERPL-SCNC: 4.5 MMOL/L (ref 3.5–5.2)
PROT SERPL-MCNC: 7.1 G/DL (ref 6–8.5)
SODIUM SERPL-SCNC: 138 MMOL/L (ref 134–144)

## 2021-09-22 DIAGNOSIS — R73.03 PRE-DIABETES: ICD-10-CM

## 2021-12-27 ENCOUNTER — OFFICE VISIT (OUTPATIENT)
Dept: FAMILY MEDICINE CLINIC | Facility: CLINIC | Age: 46
End: 2021-12-27

## 2021-12-27 VITALS
HEIGHT: 69 IN | HEART RATE: 71 BPM | OXYGEN SATURATION: 96 % | SYSTOLIC BLOOD PRESSURE: 129 MMHG | WEIGHT: 249.8 LBS | TEMPERATURE: 97.7 F | BODY MASS INDEX: 37 KG/M2 | DIASTOLIC BLOOD PRESSURE: 92 MMHG

## 2021-12-27 DIAGNOSIS — E66.01 CLASS 2 SEVERE OBESITY WITH SERIOUS COMORBIDITY AND BODY MASS INDEX (BMI) OF 37.0 TO 37.9 IN ADULT, UNSPECIFIED OBESITY TYPE: ICD-10-CM

## 2021-12-27 DIAGNOSIS — M67.912 BILATERAL SHOULDER TENDINOPATHY: Primary | ICD-10-CM

## 2021-12-27 DIAGNOSIS — E66.9 OBESITY (BMI 30-39.9): ICD-10-CM

## 2021-12-27 DIAGNOSIS — R73.03 PRE-DIABETES: ICD-10-CM

## 2021-12-27 DIAGNOSIS — E78.2 MIXED HYPERLIPIDEMIA: ICD-10-CM

## 2021-12-27 DIAGNOSIS — I10 ESSENTIAL HYPERTENSION: ICD-10-CM

## 2021-12-27 DIAGNOSIS — M67.911 BILATERAL SHOULDER TENDINOPATHY: Primary | ICD-10-CM

## 2021-12-27 PROCEDURE — 99214 OFFICE O/P EST MOD 30 MIN: CPT | Performed by: FAMILY MEDICINE

## 2021-12-27 PROCEDURE — 96372 THER/PROPH/DIAG INJ SC/IM: CPT | Performed by: FAMILY MEDICINE

## 2021-12-27 RX ORDER — TRIAMCINOLONE ACETONIDE 40 MG/ML
40 INJECTION, SUSPENSION INTRA-ARTICULAR; INTRAMUSCULAR ONCE
Status: COMPLETED | OUTPATIENT
Start: 2021-12-27 | End: 2021-12-27

## 2021-12-27 RX ORDER — LISINOPRIL 5 MG/1
5 TABLET ORAL DAILY
Qty: 90 TABLET | Refills: 1 | Status: SHIPPED | OUTPATIENT
Start: 2021-12-27 | End: 2022-09-07

## 2021-12-27 RX ORDER — SIMVASTATIN 20 MG
20 TABLET ORAL DAILY
Qty: 90 TABLET | Refills: 1 | Status: SHIPPED | OUTPATIENT
Start: 2021-12-27 | End: 2022-10-13

## 2021-12-27 RX ORDER — TIZANIDINE 4 MG/1
4 TABLET ORAL EVERY 8 HOURS PRN
Qty: 30 TABLET | Refills: 1 | Status: SHIPPED | OUTPATIENT
Start: 2021-12-27 | End: 2022-03-14

## 2021-12-27 RX ADMIN — TRIAMCINOLONE ACETONIDE 40 MG: 40 INJECTION, SUSPENSION INTRA-ARTICULAR; INTRAMUSCULAR at 10:43

## 2021-12-29 ENCOUNTER — TELEPHONE (OUTPATIENT)
Dept: FAMILY MEDICINE CLINIC | Facility: CLINIC | Age: 46
End: 2021-12-29

## 2021-12-29 NOTE — TELEPHONE ENCOUNTER
Pt called, stated that he woke up today with diarrhea and vomiting. Pt is asking if something can be called in to [facility]?

## 2021-12-29 NOTE — TELEPHONE ENCOUNTER
Likely viral in nature.  Best to let it run it's course.  If still symptomatic tomorrow afternoon, please let me know and we can send in something for the nausea.  Best to not treat the diarrhea at all.

## 2022-01-04 ENCOUNTER — TELEMEDICINE (OUTPATIENT)
Dept: FAMILY MEDICINE CLINIC | Facility: CLINIC | Age: 47
End: 2022-01-04

## 2022-01-04 ENCOUNTER — CLINICAL SUPPORT (OUTPATIENT)
Dept: FAMILY MEDICINE CLINIC | Facility: CLINIC | Age: 47
End: 2022-01-04

## 2022-01-04 DIAGNOSIS — R11.2 NAUSEA AND VOMITING IN ADULT: Primary | ICD-10-CM

## 2022-01-04 DIAGNOSIS — R50.9 FEVER AND CHILLS: ICD-10-CM

## 2022-01-04 DIAGNOSIS — R52 GENERALIZED BODY ACHES: ICD-10-CM

## 2022-01-04 DIAGNOSIS — R11.2 NAUSEA AND VOMITING IN ADULT: ICD-10-CM

## 2022-01-04 LAB
EXPIRATION DATE: NORMAL
FLUAV AG NPH QL: NEGATIVE
FLUBV AG NPH QL: NEGATIVE
INTERNAL CONTROL: NORMAL
Lab: NORMAL

## 2022-01-04 PROCEDURE — 99213 OFFICE O/P EST LOW 20 MIN: CPT | Performed by: NURSE PRACTITIONER

## 2022-01-04 PROCEDURE — 87804 INFLUENZA ASSAY W/OPTIC: CPT | Performed by: NURSE PRACTITIONER

## 2022-01-04 RX ORDER — ONDANSETRON 4 MG/1
4 TABLET, ORALLY DISINTEGRATING ORAL EVERY 8 HOURS PRN
Qty: 30 TABLET | Refills: 0 | Status: SHIPPED | OUTPATIENT
Start: 2022-01-04 | End: 2022-09-09 | Stop reason: SDUPTHER

## 2022-01-04 NOTE — PROGRESS NOTES
"Chief Complaint  Fever and Nausea    Subjective          Jagdish Kulkarni presents to [facility]  History of Present Illness  Consent signed electronically and viewed by provider.  Symptoms of nausea present for a few days with low grade fever and chills.  He also has diarrhea.  He also has a \"runny nose\" despite taking allergy medication.  He is vaccinated except for booster.  He has no known Covid exposure.    Encounter had to be finished telephonically due to poor internet speed.  Objective   Vital Signs:   There were no vitals taken for this visit.    Physical Exam   No true physical exam in this video encounter.  Result Review :                 Assessment and Plan    Diagnoses and all orders for this visit:    1. Nausea and vomiting in adult (Primary)  -     COVID-19,[facility] ROUTINE, NP/OP SWAB IN TRANSPORT MEDIA OR ESWAB 72 HR TAT - Swab, Oropharynx; Future  -     ondansetron ODT (Zofran ODT) 4 MG disintegrating tablet; Place 1 tablet on the tongue Every 8 (Eight) Hours As Needed for Nausea or Vomiting.  Dispense: 30 tablet; Refill: 0  -     QUESTIONNAIRE SERIES  -     POCT Influenza A/B    2. Fever and chills  -     COVID-19,[facility] ROUTINE, NP/OP SWAB IN TRANSPORT MEDIA OR ESWAB 72 HR TAT - Swab, Oropharynx; Future  -     QUESTIONNAIRE SERIES  -     POCT Influenza A/B    3. Generalized body aches  -     COVID-19,[facility] ROUTINE, NP/OP SWAB IN TRANSPORT MEDIA OR ESWAB 72 HR TAT - Swab, Oropharynx; Future  -     QUESTIONNAIRE SERIES  -     POCT Influenza A/B        Follow Up   Return if symptoms worsen or fail to improve.  Patient was given instructions and counseling regarding his condition or for health maintenance advice. Please see specific information pulled into the AVS if appropriate.       "

## 2022-01-04 NOTE — PROGRESS NOTES
Patient presented to the office for covid swab and flu swab via drive thru. Specimen was collected, tested, and resulted to Provider for review.

## 2022-01-06 LAB
LABCORP SARS-COV-2, NAA 2 DAY TAT: NORMAL
SARS-COV-2 RNA RESP QL NAA+PROBE: NOT DETECTED

## 2022-03-14 ENCOUNTER — OFFICE VISIT (OUTPATIENT)
Dept: FAMILY MEDICINE CLINIC | Facility: CLINIC | Age: 47
End: 2022-03-14

## 2022-03-14 VITALS
BODY MASS INDEX: 35.84 KG/M2 | HEART RATE: 86 BPM | OXYGEN SATURATION: 99 % | DIASTOLIC BLOOD PRESSURE: 81 MMHG | WEIGHT: 242 LBS | SYSTOLIC BLOOD PRESSURE: 119 MMHG | HEIGHT: 69 IN | TEMPERATURE: 97.4 F

## 2022-03-14 DIAGNOSIS — G89.29 CHRONIC LEFT SHOULDER PAIN: Primary | ICD-10-CM

## 2022-03-14 DIAGNOSIS — G89.29 CHRONIC LEFT SHOULDER PAIN: ICD-10-CM

## 2022-03-14 DIAGNOSIS — M25.512 CHRONIC LEFT SHOULDER PAIN: Primary | ICD-10-CM

## 2022-03-14 DIAGNOSIS — M25.512 CHRONIC LEFT SHOULDER PAIN: ICD-10-CM

## 2022-03-14 PROCEDURE — 96372 THER/PROPH/DIAG INJ SC/IM: CPT | Performed by: NURSE PRACTITIONER

## 2022-03-14 PROCEDURE — 99213 OFFICE O/P EST LOW 20 MIN: CPT | Performed by: NURSE PRACTITIONER

## 2022-03-14 RX ORDER — DICLOFENAC SODIUM 75 MG/1
75 TABLET, DELAYED RELEASE ORAL 2 TIMES DAILY
Qty: 60 TABLET | Refills: 0 | Status: SHIPPED | OUTPATIENT
Start: 2022-03-14

## 2022-03-14 RX ORDER — DULOXETIN HYDROCHLORIDE 60 MG/1
1 CAPSULE, DELAYED RELEASE ORAL DAILY
COMMUNITY
Start: 2022-02-22

## 2022-03-14 RX ORDER — TRIAMCINOLONE ACETONIDE 40 MG/ML
40 INJECTION, SUSPENSION INTRA-ARTICULAR; INTRAMUSCULAR ONCE
Status: COMPLETED | OUTPATIENT
Start: 2022-03-14 | End: 2022-03-14

## 2022-03-14 RX ORDER — BACLOFEN 20 MG/1
20 TABLET ORAL 3 TIMES DAILY PRN
Qty: 90 TABLET | Refills: 0 | Status: SHIPPED | OUTPATIENT
Start: 2022-03-14

## 2022-03-14 RX ORDER — CYCLOBENZAPRINE HCL 10 MG
1 TABLET ORAL 2 TIMES DAILY
COMMUNITY
Start: 2022-02-07 | End: 2022-03-14

## 2022-03-14 RX ADMIN — TRIAMCINOLONE ACETONIDE 40 MG: 40 INJECTION, SUSPENSION INTRA-ARTICULAR; INTRAMUSCULAR at 10:39

## 2022-03-14 NOTE — PROGRESS NOTES
"Chief Complaint  Shoulder Pain (LEFT)    Subjective          Hiland Lady presents to [facility]  History of Present Illness  Shoulder pain  Chronic, recurrent problem. Left shoulder pain. Occurring since injury in 2020. Current issue has been occurring for 3 weeks. He denies any specific injury. He does push and pull working on cars. Feels like he may have slept on it wrong. Left lateral pain.   Saw dr. Guerrero about this and was diagnosed with tendonitis. Treated with systemic steroid injection, which he reports helped for a few days. Also treated with zanaflex however he reports it has not helped at all.   Cannot tolerate oral steroids, they make him violent he reports.           Objective   Vital Signs:   /81 (BP Location: Left arm, Patient Position: Sitting, Cuff Size: Large Adult)   Pulse 86   Temp 97.4 °F (36.3 °C)   Ht 175.3 cm (69\") Comment: pt reported  Wt 110 kg (242 lb)   SpO2 99%   BMI 35.74 kg/m²     Physical Exam  Vitals and nursing note reviewed.   Constitutional:       Appearance: He is well-developed.   HENT:      Head: Normocephalic and atraumatic.   Eyes:      Conjunctiva/sclera: Conjunctivae normal.   Cardiovascular:      Rate and Rhythm: Normal rate and regular rhythm.   Pulmonary:      Effort: Pulmonary effort is normal.   Musculoskeletal:      Left shoulder: Tenderness present. Decreased range of motion. Decreased strength.      Cervical back: Neck supple.   Lymphadenopathy:      Cervical: No cervical adenopathy.   Skin:     General: Skin is warm and dry.   Neurological:      Mental Status: He is alert and oriented to person, place, and time.        Result Review :                 Assessment and Plan    Diagnoses and all orders for this visit:    1. Chronic left shoulder pain (Primary)  -     XR Shoulder 2+ View Left; Future  -     triamcinolone acetonide (KENALOG-40) injection 40 mg    Other orders  -     baclofen (LIORESAL) 20 MG tablet; Take 1 " tablet by mouth 3 (Three) Times a Day As Needed for Muscle Spasms.  Dispense: 90 tablet; Refill: 0  -     diclofenac (VOLTAREN) 75 MG EC tablet; Take 1 tablet by mouth 2 (Two) Times a Day.  Dispense: 60 tablet; Refill: 0      Plan:  Xray left shoulder  Kenalog im   voltaren and baclofen       Follow Up   Return in about 2 weeks (around 3/28/2022), or if symptoms worsen or fail to improve.  Patient was given instructions and counseling regarding his condition or for health maintenance advice. Please see specific information pulled into the AVS if appropriate.

## 2022-04-27 DIAGNOSIS — K21.9 GASTROESOPHAGEAL REFLUX DISEASE: ICD-10-CM

## 2022-04-27 RX ORDER — OMEPRAZOLE 20 MG/1
CAPSULE, DELAYED RELEASE ORAL
Qty: 90 CAPSULE | Refills: 1 | OUTPATIENT
Start: 2022-04-27

## 2022-04-28 RX ORDER — OMEPRAZOLE 20 MG/1
CAPSULE, DELAYED RELEASE ORAL
Qty: 90 CAPSULE | Refills: 3 | Status: SHIPPED | OUTPATIENT
Start: 2022-04-28 | End: 2022-06-15 | Stop reason: SDUPTHER

## 2022-05-19 ENCOUNTER — OFFICE VISIT (OUTPATIENT)
Dept: FAMILY MEDICINE CLINIC | Facility: CLINIC | Age: 47
End: 2022-05-19

## 2022-05-19 VITALS
WEIGHT: 227.2 LBS | HEIGHT: 70 IN | BODY MASS INDEX: 32.53 KG/M2 | SYSTOLIC BLOOD PRESSURE: 125 MMHG | OXYGEN SATURATION: 99 % | HEART RATE: 94 BPM | TEMPERATURE: 96.8 F | DIASTOLIC BLOOD PRESSURE: 88 MMHG

## 2022-05-19 DIAGNOSIS — F41.8 SITUATIONAL ANXIETY: Primary | ICD-10-CM

## 2022-05-19 PROCEDURE — 99213 OFFICE O/P EST LOW 20 MIN: CPT | Performed by: NURSE PRACTITIONER

## 2022-05-19 RX ORDER — ALPRAZOLAM 0.5 MG/1
0.5 TABLET ORAL 2 TIMES DAILY PRN
Qty: 30 TABLET | Refills: 0 | Status: SHIPPED | OUTPATIENT
Start: 2022-05-19 | End: 2022-08-12 | Stop reason: SDUPTHER

## 2022-05-19 RX ORDER — FAMOTIDINE 40 MG/1
40 TABLET, FILM COATED ORAL DAILY
COMMUNITY
Start: 2022-05-09 | End: 2022-06-15 | Stop reason: SDUPTHER

## 2022-05-19 NOTE — PROGRESS NOTES
CC: anxiety    History:  Jagdish Kulkarni is a 46 y.o. male who presents today for evaluation of the above problems.    Patient's wife has left him and she has put EPO against him to get custody of the younger daughter who is 16. This has all came up in the last 3 weeks.  Weight is down 15 pounds from his last visit. He is having trouble sleeping and he breaks down multiple times during visit.   He is a current Pain Mgmt. patient with regular fills of Norco and gabapentin.  He is already on Cymbalta 60 mg daily.         HPI  ROS:  Review of Systems   Psychiatric/Behavioral: Positive for dysphoric mood. The patient is nervous/anxious.        Allergies   Allergen Reactions   • Other Other (See Comments)     Oral steroids:  Personality changes   • Oxycontin [Oxycodone] Nausea And Vomiting and Other (See Comments)     Nightmares      Past Medical History:   Diagnosis Date   • Chronic bilateral low back pain without sciatica 6/14/2021   • Diabetes mellitus (HCC)     border line   • Elevated cholesterol    • GERD (gastroesophageal reflux disease)    • Hyperlipidemia    • Hypertension    • PONV (postoperative nausea and vomiting)      Past Surgical History:   Procedure Laterality Date   • CHOLECYSTECTOMY     • HARDWARE REMOVAL N/A 6/16/2021    Procedure: REMOVAL OF INSTRUMENTATION;  Surgeon: EDIE Castro MD;  Location:  [facility] OR;  Service: Orthopedic Spine;  Laterality: N/A;   • LUMBAR FUSION Right 6/14/2021    Procedure: RIGHT LATERAL LUMBAR INTERBODY FUSION WITH INSTRUMENTATION L3-4;  Surgeon: EDIE Castro MD;  Location:  [facility] OR;  Service: Orthopedic Spine;  Laterality: Right;   • LUMBAR LAMINECTOMY WITH FUSION Left 6/16/2021    Procedure: REMOVAL OF INSTRUMENTATION EXPLORATION OF FUSION LEFT L4-S1, POSTERIOR SPINAL FUSION L3-4 WITH INSTRUMENTATION L3-S1;  Surgeon: EDIE Castro MD;  Location:  [facility] OR;  Service: Orthopedic Spine;  Laterality: Left;   • LUMBAR SPINE SURGERY       Family History    Problem Relation Age of Onset   • Hypertension Mother    • Hypertension Father       reports that he has never smoked. He has never used smokeless tobacco. He reports current alcohol use. He reports that he does not use drugs.      Current Outpatient Medications:   •  baclofen (LIORESAL) 20 MG tablet, Take 1 tablet by mouth 3 (Three) Times a Day As Needed for Muscle Spasms., Disp: 90 tablet, Rfl: 0  •  cetirizine (zyrTEC) 10 MG tablet, TAKE ONE TABLET BY MOUTH EVERY DAY (Patient taking differently: 10 mg Daily As Needed.), Disp: 30 tablet, Rfl: 2  •  DULoxetine (CYMBALTA) 60 MG capsule, Take 1 capsule by mouth Daily., Disp: , Rfl:   •  gabapentin (NEURONTIN) 600 MG tablet, Take 1 tablet by mouth 3 (Three) Times a Day., Disp: 90 tablet, Rfl: 0  •  HYDROcodone-acetaminophen (NORCO)  MG per tablet, Take 1 tablet by mouth Every 6 (Six) Hours As Needed for Moderate Pain  or Severe Pain ., Disp: 60 tablet, Rfl: 0  •  lisinopril (PRINIVIL,ZESTRIL) 5 MG tablet, Take 1 tablet by mouth Daily., Disp: 90 tablet, Rfl: 1  •  meclizine (ANTIVERT) 25 MG tablet, Take 1 tablet by mouth 3 (Three) Times a Day As Needed for Dizziness., Disp: 30 tablet, Rfl: 0  •  metFORMIN (GLUCOPHAGE) 500 MG tablet, Take 1 tablet by mouth Daily With Breakfast., Disp: 90 tablet, Rfl: 1  •  omeprazole (priLOSEC) 20 MG capsule, TAKE ONE CAPSULE BY MOUTH EVERY DAY, Disp: 90 capsule, Rfl: 3  •  ondansetron ODT (Zofran ODT) 4 MG disintegrating tablet, Place 1 tablet on the tongue Every 8 (Eight) Hours As Needed for Nausea or Vomiting., Disp: 30 tablet, Rfl: 0  •  simvastatin (ZOCOR) 20 MG tablet, Take 1 tablet by mouth Daily., Disp: 90 tablet, Rfl: 1  •  ALPRAZolam (Xanax) 0.5 MG tablet, Take 1 tablet by mouth 2 (Two) Times a Day As Needed for Anxiety., Disp: 30 tablet, Rfl: 0  •  diclofenac (VOLTAREN) 75 MG EC tablet, Take 1 tablet by mouth 2 (Two) Times a Day., Disp: 60 tablet, Rfl: 0  •  famotidine (PEPCID) 40 MG tablet, Take 40 mg by mouth  "Daily., Disp: , Rfl:   •  traZODone (DESYREL) 50 MG tablet, Take 1 tablet by mouth At Night As Needed for Sleep., Disp: 20 tablet, Rfl: 0    OBJECTIVE:  /88 (BP Location: Left arm, Patient Position: Sitting, Cuff Size: Adult)   Pulse 94   Temp 96.8 °F (36 °C) (Temporal)   Ht 177.8 cm (70\") Comment: pt reported  Wt 103 kg (227 lb 3.2 oz)   SpO2 99%   BMI 32.60 kg/m²    Physical Exam  Vitals reviewed.   Constitutional:       Appearance: He is well-developed.   Cardiovascular:      Rate and Rhythm: Normal rate.   Pulmonary:      Effort: Pulmonary effort is normal.   Neurological:      Mental Status: He is alert and oriented to person, place, and time.   Psychiatric:         Mood and Affect: Affect is tearful.         Behavior: Behavior normal.         Thought Content: Thought content does not include suicidal ideation. Thought content does not include suicidal plan.         Assessment/Plan    Diagnoses and all orders for this visit:    1. Situational anxiety (Primary)  -     ALPRAZolam (Xanax) 0.5 MG tablet; Take 1 tablet by mouth 2 (Two) Times a Day As Needed for Anxiety.  Dispense: 30 tablet; Refill: 0    Short term xanax for acute anxiety. Patient is cautioned against taking medication and driving, at least until he is able to see how he tolerates the medication so he will not drive if impaired.     An After Visit Summary was printed and given to the patient at discharge.  Return in about 2 weeks (around 6/2/2022) for Recheck - anxiety.       ELA Young 5/19/22    Electronically signed.  "

## 2022-06-15 ENCOUNTER — OFFICE VISIT (OUTPATIENT)
Dept: FAMILY MEDICINE CLINIC | Facility: CLINIC | Age: 47
End: 2022-06-15

## 2022-06-15 VITALS
HEIGHT: 70 IN | DIASTOLIC BLOOD PRESSURE: 71 MMHG | OXYGEN SATURATION: 99 % | BODY MASS INDEX: 32.78 KG/M2 | HEART RATE: 62 BPM | WEIGHT: 229 LBS | SYSTOLIC BLOOD PRESSURE: 104 MMHG | TEMPERATURE: 97.8 F

## 2022-06-15 DIAGNOSIS — F41.8 SITUATIONAL ANXIETY: ICD-10-CM

## 2022-06-15 DIAGNOSIS — I10 ESSENTIAL HYPERTENSION: ICD-10-CM

## 2022-06-15 DIAGNOSIS — K21.9 GASTROESOPHAGEAL REFLUX DISEASE, UNSPECIFIED WHETHER ESOPHAGITIS PRESENT: Primary | ICD-10-CM

## 2022-06-15 PROCEDURE — 99213 OFFICE O/P EST LOW 20 MIN: CPT | Performed by: NURSE PRACTITIONER

## 2022-06-15 RX ORDER — FAMOTIDINE 40 MG/1
40 TABLET, FILM COATED ORAL DAILY
Qty: 90 TABLET | Refills: 1 | Status: SHIPPED | OUTPATIENT
Start: 2022-06-15 | End: 2022-11-08 | Stop reason: SDUPTHER

## 2022-06-15 RX ORDER — OMEPRAZOLE 20 MG/1
20 CAPSULE, DELAYED RELEASE ORAL DAILY
Qty: 90 CAPSULE | Refills: 1 | Status: SHIPPED | OUTPATIENT
Start: 2022-06-15 | End: 2022-11-08 | Stop reason: SDUPTHER

## 2022-06-15 NOTE — PROGRESS NOTES
"Chief Complaint  Anxiety and Heartburn    Subjective        Jagdish Kulkarni presents to [facility]  History of Present Illness  GERD:  Patient presents for refills of medications for GERD.  Symptoms worsened recently with the separation of his spouse.  They have reunited but patient continues to have acid reflux if he does not consistently take both Omeprazole and Famotidine.  He needs refills.  ANXIETY:  He was provided a one time script for alprazolam during the separation.  He only took a few and they were effective.  He does not wish to continue the medication as it is no longer needed.  Objective   Vital Signs:  /71 (BP Location: Right arm, Patient Position: Sitting, Cuff Size: Large Adult)   Pulse 62   Temp 97.8 °F (36.6 °C)   Ht 177.8 cm (70\") Comment: pt reported  Wt 104 kg (229 lb)   SpO2 99%   BMI 32.86 kg/m²   Estimated body mass index is 32.86 kg/m² as calculated from the following:    Height as of this encounter: 177.8 cm (70\").    Weight as of this encounter: 104 kg (229 lb).          Physical Exam  Vitals and nursing note reviewed.   Constitutional:       General: He is not in acute distress.     Appearance: Normal appearance. He is not ill-appearing.   HENT:      Head: Normocephalic.   Cardiovascular:      Rate and Rhythm: Normal rate and regular rhythm.      Heart sounds: Normal heart sounds. No murmur heard.  Pulmonary:      Breath sounds: Normal breath sounds.   Skin:     General: Skin is warm and dry.   Neurological:      Mental Status: He is alert and oriented to person, place, and time.   Psychiatric:         Thought Content: Thought content normal.        Result Review :                Assessment and Plan   Diagnoses and all orders for this visit:    1. Gastroesophageal reflux disease, unspecified whether esophagitis present (Primary)  -     omeprazole (priLOSEC) 20 MG capsule; Take 1 capsule by mouth Daily.  Dispense: 90 capsule; Refill: 1  -     " famotidine (PEPCID) 40 MG tablet; Take 1 tablet by mouth Daily.  Dispense: 90 tablet; Refill: 1    2. Essential hypertension    3. Situational anxiety    Continue current treatment plan.         Follow Up   Return in about 6 weeks (around 7/27/2022) for Annual physical with labs prior.  Patient was given instructions and counseling regarding his condition or for health maintenance advice. Please see specific information pulled into the AVS if appropriate.

## 2022-08-12 ENCOUNTER — OFFICE VISIT (OUTPATIENT)
Dept: FAMILY MEDICINE CLINIC | Facility: CLINIC | Age: 47
End: 2022-08-12

## 2022-08-12 VITALS
HEIGHT: 70 IN | BODY MASS INDEX: 31.92 KG/M2 | OXYGEN SATURATION: 98 % | WEIGHT: 223 LBS | SYSTOLIC BLOOD PRESSURE: 118 MMHG | TEMPERATURE: 97.5 F | HEART RATE: 56 BPM | DIASTOLIC BLOOD PRESSURE: 77 MMHG

## 2022-08-12 DIAGNOSIS — I10 ESSENTIAL HYPERTENSION: ICD-10-CM

## 2022-08-12 DIAGNOSIS — Z00.00 ANNUAL PHYSICAL EXAM: Primary | ICD-10-CM

## 2022-08-12 DIAGNOSIS — R73.03 PRE-DIABETES: ICD-10-CM

## 2022-08-12 DIAGNOSIS — E66.9 OBESITY (BMI 30-39.9): ICD-10-CM

## 2022-08-12 DIAGNOSIS — F41.8 SITUATIONAL ANXIETY: ICD-10-CM

## 2022-08-12 DIAGNOSIS — E78.2 MIXED HYPERLIPIDEMIA: ICD-10-CM

## 2022-08-12 PROCEDURE — 99396 PREV VISIT EST AGE 40-64: CPT | Performed by: NURSE PRACTITIONER

## 2022-08-12 RX ORDER — ALPRAZOLAM 0.5 MG/1
0.5 TABLET ORAL 2 TIMES DAILY PRN
Qty: 30 TABLET | Refills: 0 | Status: SHIPPED | OUTPATIENT
Start: 2022-08-12 | End: 2022-11-08 | Stop reason: SDUPTHER

## 2022-08-12 NOTE — PROGRESS NOTES
"Chief Complaint  Annual Exam    Subjective        Jagdish Kulkarni presents to [facility]  History of Present Illness  No new complaints.    Objective   Vital Signs:  /77 (BP Location: Left arm, Patient Position: Sitting, Cuff Size: Adult)   Pulse 56   Temp 97.5 °F (36.4 °C)   Ht 177.8 cm (70\") Comment: PT REPORTED  Wt 101 kg (223 lb)   SpO2 98%   BMI 32.00 kg/m²   Estimated body mass index is 32 kg/m² as calculated from the following:    Height as of this encounter: 177.8 cm (70\").    Weight as of this encounter: 101 kg (223 lb).    BMI is >= 30 and <35. (Class 1 Obesity). The following options were offered after discussion;: exercise counseling/recommendations and nutrition counseling/recommendations      Physical Exam  Vitals and nursing note reviewed.   Constitutional:       General: He is not in acute distress.     Appearance: Normal appearance. He is obese. He is not ill-appearing.   HENT:      Head: Normocephalic and atraumatic.   Cardiovascular:      Rate and Rhythm: Regular rhythm. Bradycardia present.      Pulses: Normal pulses.      Heart sounds: Normal heart sounds. No murmur heard.  Pulmonary:      Effort: Pulmonary effort is normal.      Breath sounds: Normal breath sounds.   Abdominal:      General: Bowel sounds are normal.      Palpations: Abdomen is soft.   Musculoskeletal:         General: Normal range of motion.      Cervical back: Normal range of motion and neck supple.      Right lower leg: No edema.      Left lower leg: No edema.   Lymphadenopathy:      Cervical: No cervical adenopathy.   Skin:     General: Skin is warm and dry.   Neurological:      Mental Status: He is alert and oriented to person, place, and time.   Psychiatric:         Thought Content: Thought content normal.        Result Review :    Lipid Panel    Lipid Panel 7/22/22   Total Cholesterol 166   Triglycerides 81   HDL Cholesterol 49   VLDL Cholesterol 15   LDL Cholesterol  102 (A) "   LDL/HDL Ratio 2.1   (A) Abnormal value       Comments are available for some flowsheets but are not being displayed.                     Assessment and Plan   Diagnoses and all orders for this visit:    1. Annual physical exam (Primary)    2. Pre-diabetes  -     metFORMIN (GLUCOPHAGE) 500 MG tablet; Take 1 tablet by mouth Daily With Breakfast.  Dispense: 90 tablet; Refill: 1    3. Situational anxiety  -     ALPRAZolam (Xanax) 0.5 MG tablet; Take 1 tablet by mouth 2 (Two) Times a Day As Needed for Anxiety.  Dispense: 30 tablet; Refill: 0    4. Essential hypertension    5. Mixed hyperlipidemia    6. Obesity (BMI 30-39.9)    Patient encouraged to partake of healthy diet rich in fresh fruits and vegetables as well as lean proteins.  Patient encouraged to participate in daily exercise with goal of 30 min sustained activity.         Follow Up   Return in about 6 months (around 2/12/2023) for Next scheduled follow up.  Patient was given instructions and counseling regarding his condition or for health maintenance advice. Please see specific information pulled into the AVS if appropriate.     ELA Smiley  This note is electronically signed.

## 2022-08-18 ENCOUNTER — TELEPHONE (OUTPATIENT)
Dept: FAMILY MEDICINE CLINIC | Facility: CLINIC | Age: 47
End: 2022-08-18

## 2022-09-07 DIAGNOSIS — I10 ESSENTIAL HYPERTENSION: ICD-10-CM

## 2022-09-07 RX ORDER — LISINOPRIL 5 MG/1
TABLET ORAL
Qty: 90 TABLET | Refills: 1 | Status: SHIPPED | OUTPATIENT
Start: 2022-09-07 | End: 2023-03-16

## 2022-09-07 NOTE — TELEPHONE ENCOUNTER
Rx Refill Note  Requested Prescriptions     Pending Prescriptions Disp Refills   • lisinopril (PRINIVIL,ZESTRIL) 5 MG tablet [Pharmacy Med Name: LISINOPRIL 5MG TABS] 90 tablet 1     Sig: TAKE ONE TABLET BY MOUTH EVERY DAY      Last office visit with prescribing clinician: 8/12/2022      Next office visit with prescribing clinician: Visit date not found     Lay Chang MA  09/07/22, 12:59 CDT

## 2022-09-09 ENCOUNTER — TELEPHONE (OUTPATIENT)
Dept: FAMILY MEDICINE CLINIC | Facility: CLINIC | Age: 47
End: 2022-09-09

## 2022-09-09 DIAGNOSIS — R11.2 NAUSEA AND VOMITING IN ADULT: ICD-10-CM

## 2022-09-09 RX ORDER — ONDANSETRON 4 MG/1
4 TABLET, ORALLY DISINTEGRATING ORAL EVERY 8 HOURS PRN
Qty: 30 TABLET | Refills: 0 | Status: SHIPPED | OUTPATIENT
Start: 2022-09-09 | End: 2022-12-15

## 2022-09-09 NOTE — TELEPHONE ENCOUNTER
Med sent in for nausea and vomiting.  Do not recommend treating the diarrhea.  Eat bland diet (no fried, greasy, spicy or acidic foods) until symptoms completely resolve.

## 2022-09-09 NOTE — TELEPHONE ENCOUNTER
Pt called stating he needed a medication called in for his puking and excessively going to the bathroom.

## 2022-09-20 ENCOUNTER — OFFICE VISIT (OUTPATIENT)
Dept: FAMILY MEDICINE CLINIC | Facility: CLINIC | Age: 47
End: 2022-09-20

## 2022-09-20 VITALS
HEIGHT: 69 IN | SYSTOLIC BLOOD PRESSURE: 122 MMHG | DIASTOLIC BLOOD PRESSURE: 79 MMHG | WEIGHT: 227 LBS | TEMPERATURE: 97.2 F | OXYGEN SATURATION: 97 % | BODY MASS INDEX: 33.62 KG/M2 | HEART RATE: 78 BPM

## 2022-09-20 DIAGNOSIS — M51.36 DDD (DEGENERATIVE DISC DISEASE), LUMBAR: Primary | ICD-10-CM

## 2022-09-20 DIAGNOSIS — M54.41 CHRONIC BILATERAL LOW BACK PAIN WITH BILATERAL SCIATICA: ICD-10-CM

## 2022-09-20 DIAGNOSIS — B36.0 PITYRIASIS VERSICOLOR: ICD-10-CM

## 2022-09-20 DIAGNOSIS — G89.29 CHRONIC BILATERAL LOW BACK PAIN WITH BILATERAL SCIATICA: ICD-10-CM

## 2022-09-20 DIAGNOSIS — M54.42 CHRONIC BILATERAL LOW BACK PAIN WITH BILATERAL SCIATICA: ICD-10-CM

## 2022-09-20 PROCEDURE — 96372 THER/PROPH/DIAG INJ SC/IM: CPT | Performed by: NURSE PRACTITIONER

## 2022-09-20 PROCEDURE — 99213 OFFICE O/P EST LOW 20 MIN: CPT | Performed by: NURSE PRACTITIONER

## 2022-09-20 RX ORDER — TRIAMCINOLONE ACETONIDE 40 MG/ML
80 INJECTION, SUSPENSION INTRA-ARTICULAR; INTRAMUSCULAR ONCE
Status: COMPLETED | OUTPATIENT
Start: 2022-09-20 | End: 2022-09-20

## 2022-09-20 RX ADMIN — TRIAMCINOLONE ACETONIDE 80 MG: 40 INJECTION, SUSPENSION INTRA-ARTICULAR; INTRAMUSCULAR at 09:23

## 2022-09-21 NOTE — PROGRESS NOTES
"Chief Complaint  Rash and Back Pain    Subjective        Jagdish Kulkarni presents to [facility]  History of Present Illness  BACK PAIN:  This is a chronic condition.  Patient responds well to occasional steroid injections and presents today to see if he would be a candidate for an injection.  He has started a new job that requires lifting and this has aggravated his degenerative condition.  RASH:  Patient again presents with a rash on his torso that is itchy and the rash is a lighter color that his normal coloration.  He previously has had pityriasis rosea.    Objective   Vital Signs:  /79 (BP Location: Right arm, Patient Position: Sitting, Cuff Size: Adult)   Pulse 78   Temp 97.2 °F (36.2 °C)   Ht 175.3 cm (69\") Comment: pt reported  Wt 103 kg (227 lb)   SpO2 97%   BMI 33.52 kg/m²   Estimated body mass index is 33.52 kg/m² as calculated from the following:    Height as of this encounter: 175.3 cm (69\").    Weight as of this encounter: 103 kg (227 lb).          Physical Exam  Vitals reviewed.   Constitutional:       General: He is not in acute distress.     Appearance: Normal appearance. He is obese. He is not ill-appearing.   HENT:      Head: Normocephalic and atraumatic.   Cardiovascular:      Rate and Rhythm: Normal rate and regular rhythm.      Heart sounds: Normal heart sounds. No murmur heard.  Musculoskeletal:      Comments: Limited spinal ROM due to pain.   Skin:     General: Skin is warm and dry.      Findings: Rash present.      Comments: Circular macular rash present on chest and back with absence of pigmentation.  Pruritic.   Neurological:      Mental Status: He is alert.        Result Review :                Assessment and Plan   Diagnoses and all orders for this visit:    1. DDD (degenerative disc disease), lumbar (Primary)  -     triamcinolone acetonide (KENALOG-40) injection 80 mg    2. Chronic bilateral low back pain with bilateral sciatica  -     " triamcinolone acetonide (KENALOG-40) injection 80 mg    3. Pityriasis versicolor    Patient recommended to shower in Selsun Blue shampoo daily for rash.         Follow Up   Return if symptoms worsen or fail to improve.  Patient was given instructions and counseling regarding his condition or for health maintenance advice. Please see specific information pulled into the AVS if appropriate.     ELA Smiley  This note is electronically signed.

## 2022-10-13 DIAGNOSIS — E78.2 MIXED HYPERLIPIDEMIA: ICD-10-CM

## 2022-10-13 RX ORDER — SIMVASTATIN 20 MG
TABLET ORAL
Qty: 90 TABLET | Refills: 1 | Status: SHIPPED | OUTPATIENT
Start: 2022-10-13

## 2022-10-13 NOTE — TELEPHONE ENCOUNTER
Rx Refill Note  Requested Prescriptions     Pending Prescriptions Disp Refills   • simvastatin (ZOCOR) 20 MG tablet [Pharmacy Med Name: SIMVASTATIN 20MG TABS] 90 tablet 1     Sig: TAKE ONE TABLET BY MOUTH EVERY DAY      Last office visit with prescribing clinician: 9/20/2022      Next office visit with prescribing clinician: Visit date not found     Lay Chang MA  10/13/22, 10:59 CDT

## 2022-11-08 ENCOUNTER — CLINICAL SUPPORT (OUTPATIENT)
Dept: FAMILY MEDICINE CLINIC | Facility: CLINIC | Age: 47
End: 2022-11-08

## 2022-11-08 DIAGNOSIS — K21.9 GASTROESOPHAGEAL REFLUX DISEASE, UNSPECIFIED WHETHER ESOPHAGITIS PRESENT: ICD-10-CM

## 2022-11-08 DIAGNOSIS — Z23 NEED FOR INFLUENZA VACCINATION: Primary | ICD-10-CM

## 2022-11-08 DIAGNOSIS — R73.03 PRE-DIABETES: ICD-10-CM

## 2022-11-08 DIAGNOSIS — F41.8 SITUATIONAL ANXIETY: ICD-10-CM

## 2022-11-08 PROCEDURE — 90686 IIV4 VACC NO PRSV 0.5 ML IM: CPT | Performed by: NURSE PRACTITIONER

## 2022-11-08 PROCEDURE — 90471 IMMUNIZATION ADMIN: CPT | Performed by: NURSE PRACTITIONER

## 2022-11-08 RX ORDER — ALPRAZOLAM 0.5 MG/1
0.5 TABLET ORAL 2 TIMES DAILY PRN
Qty: 30 TABLET | Refills: 0 | Status: SHIPPED | OUTPATIENT
Start: 2022-11-08 | End: 2022-12-21 | Stop reason: SDUPTHER

## 2022-11-08 RX ORDER — OMEPRAZOLE 20 MG/1
20 CAPSULE, DELAYED RELEASE ORAL DAILY
Qty: 90 CAPSULE | Refills: 1 | Status: SHIPPED | OUTPATIENT
Start: 2022-11-08

## 2022-11-08 RX ORDER — FAMOTIDINE 40 MG/1
40 TABLET, FILM COATED ORAL DAILY
Qty: 90 TABLET | Refills: 1 | Status: SHIPPED | OUTPATIENT
Start: 2022-11-08

## 2022-11-08 NOTE — TELEPHONE ENCOUNTER
Rx Refill Note  Requested Prescriptions     Pending Prescriptions Disp Refills   • ALPRAZolam (Xanax) 0.5 MG tablet 30 tablet 0     Sig: Take 1 tablet by mouth 2 (Two) Times a Day As Needed for Anxiety.   • omeprazole (priLOSEC) 20 MG capsule 90 capsule 1     Sig: Take 1 capsule by mouth Daily.   • metFORMIN (GLUCOPHAGE) 500 MG tablet 90 tablet 1     Sig: Take 1 tablet by mouth Daily With Breakfast.   • famotidine (PEPCID) 40 MG tablet 90 tablet 1     Sig: Take 1 tablet by mouth Daily.      Last office visit with prescribing clinician: 9/20/2022      Next office visit with prescribing clinician: 12/20/2022      Lay Chang MA  11/08/22, 07:30 CST

## 2022-12-01 ENCOUNTER — OFFICE VISIT (OUTPATIENT)
Dept: FAMILY MEDICINE CLINIC | Facility: CLINIC | Age: 47
End: 2022-12-01

## 2022-12-01 VITALS
HEART RATE: 67 BPM | TEMPERATURE: 97.6 F | OXYGEN SATURATION: 98 % | SYSTOLIC BLOOD PRESSURE: 126 MMHG | BODY MASS INDEX: 34.07 KG/M2 | DIASTOLIC BLOOD PRESSURE: 87 MMHG | WEIGHT: 230 LBS | HEIGHT: 69 IN

## 2022-12-01 DIAGNOSIS — J06.9 UPPER RESPIRATORY TRACT INFECTION DUE TO COVID-19 VIRUS: Primary | ICD-10-CM

## 2022-12-01 DIAGNOSIS — J34.89 STUFFY AND RUNNY NOSE: ICD-10-CM

## 2022-12-01 DIAGNOSIS — R05.1 ACUTE COUGH: ICD-10-CM

## 2022-12-01 DIAGNOSIS — U07.1 UPPER RESPIRATORY TRACT INFECTION DUE TO COVID-19 VIRUS: Primary | ICD-10-CM

## 2022-12-01 LAB
EXPIRATION DATE: ABNORMAL
FLUAV AG UPPER RESP QL IA.RAPID: NOT DETECTED
FLUBV AG UPPER RESP QL IA.RAPID: NOT DETECTED
INTERNAL CONTROL: ABNORMAL
Lab: ABNORMAL
SARS-COV-2 AG UPPER RESP QL IA.RAPID: DETECTED

## 2022-12-01 PROCEDURE — 87428 SARSCOV & INF VIR A&B AG IA: CPT | Performed by: NURSE PRACTITIONER

## 2022-12-01 PROCEDURE — 99213 OFFICE O/P EST LOW 20 MIN: CPT | Performed by: NURSE PRACTITIONER

## 2022-12-01 RX ORDER — METHYLPREDNISOLONE 4 MG/1
TABLET ORAL
Qty: 1 EACH | Refills: 0 | Status: SHIPPED | OUTPATIENT
Start: 2022-12-01 | End: 2022-12-15

## 2022-12-01 NOTE — PROGRESS NOTES
"Chief Complaint  Nasal Congestion and Cough    Subjective        [facility]  History of Present Illness  Patient presents with 2 day history of runny nose; stuffy nose and irritating cough.  No fever.  No headaches.  No body aches.  Denies n/v/d.  Left work early last night due to feeling bad.  No known exposure.  Patient is fully vaccinated but not boosted.    Objective   Vital Signs:  /87 (BP Location: Left arm, Patient Position: Sitting, Cuff Size: Adult)   Pulse 67   Temp 97.6 °F (36.4 °C)   Ht 175.3 cm (69\")   Wt 104 kg (230 lb)   SpO2 98%   BMI 33.97 kg/m²   Estimated body mass index is 33.97 kg/m² as calculated from the following:    Height as of this encounter: 175.3 cm (69\").    Weight as of this encounter: 104 kg (230 lb).    BMI is >= 30 and <35. (Class 1 Obesity). The following options were offered after discussion;: exercise counseling/recommendations and nutrition counseling/recommendations      Physical Exam  Vitals and nursing note reviewed.   Constitutional:       General: He is not in acute distress.     Appearance: Normal appearance. He is obese. He is ill-appearing.   HENT:      Head: Normocephalic and atraumatic.      Right Ear: Tympanic membrane and ear canal normal.      Left Ear: Tympanic membrane and ear canal normal.      Nose: Congestion present.      Mouth/Throat:      Mouth: Mucous membranes are moist.      Pharynx: Oropharynx is clear.   Cardiovascular:      Rate and Rhythm: Normal rate and regular rhythm.      Heart sounds: Normal heart sounds. No murmur heard.  Pulmonary:      Effort: Pulmonary effort is normal.      Breath sounds: Normal breath sounds.   Skin:     General: Skin is warm and dry.   Neurological:      Mental Status: He is alert and oriented to person, place, and time.        Result Review :                Assessment and Plan   Diagnoses and all orders for this visit:    1. Upper respiratory tract " infection due to COVID-19 virus (Primary)  -     QUESTIONNAIRE SERIES  -     methylPREDNISolone (MEDROL) 4 MG dose pack; Take as directed on package instructions.  Dispense: 1 each; Refill: 0    2. Acute cough  -     POCT SARS-CoV-2 Antigen VALENTÍN + Flu    3. Stuffy and runny nose  -     POCT SARS-CoV-2 Antigen VALENTÍN + Flu    Patient instructed to quarantine x 7 days total.  Rest and hydrate.  Call with increase in symptoms.         Follow Up   Return if symptoms worsen or fail to improve.  Patient was given instructions and counseling regarding his condition or for health maintenance advice. Please see specific information pulled into the AVS if appropriate.     ELA Smiley  This note is electronically signed.

## 2022-12-02 ENCOUNTER — TELEPHONE (OUTPATIENT)
Dept: FAMILY MEDICINE CLINIC | Facility: CLINIC | Age: 47
End: 2022-12-02

## 2022-12-02 NOTE — TELEPHONE ENCOUNTER
Caller: Jagdish Kulkarni    Relationship: Self    Best call back number: [phone]    What form or medical record are you requesting: TEST RESULTS FOR COVID TAKEN 12.1.22     Who is requesting this form or medical record from you: HYDROGEAR     How would you like to receive the form or medical records (pick-up, mail, fax):   PATIENT WAS WANTING IT TO BE FAXED.    FAX- [phone]     Timeframe paperwork needed: ASAP     Additional notes: PATIENT WAS JUST SEEN IN OFFICE YESTERDAY BY RADHA AND TESTED POSITIVE FOR COVID. PATIENTS JOB NEEDS PROOF OF TEST RESULTS SENT TO HR.

## 2022-12-06 ENCOUNTER — TELEPHONE (OUTPATIENT)
Dept: FAMILY MEDICINE CLINIC | Facility: CLINIC | Age: 47
End: 2022-12-06

## 2022-12-06 NOTE — TELEPHONE ENCOUNTER
PATIENT WANTED TO ADD THAT HE IS HAVING HOT AND COLD CHILLS. PLEASE CALL PATIENT AND LET HIM KNOW ABOUT IF HE CAN RETURN TO WORK TOMORROW.

## 2022-12-06 NOTE — TELEPHONE ENCOUNTER
Caller: Luisviri Jagdish    Relationship: Self    Best call back number:[phone]    What is the best time to reach you: ANYTIME     Who are you requesting to speak with (clinical staff, provider,  specific staff member): CLINICAL     What was the call regarding: PATIENT HAS TESTED POSITIVE FOR COVID LAST WEEK AND IS STILL FEELING BAD. PATIENT IS STILL HAVING FATIGUE. NEEDING TO KNOW IF HE SHOULD RETURN TO WORK TODAY.     Do you require a callback: YES

## 2022-12-07 ENCOUNTER — TELEPHONE (OUTPATIENT)
Dept: FAMILY MEDICINE CLINIC | Facility: CLINIC | Age: 47
End: 2022-12-07

## 2022-12-07 NOTE — TELEPHONE ENCOUNTER
Caller: UDAY MARTHARUDI     Relationship: SELF     Best call back number:    [phone] (Mobile)         What is the best time to reach you:  ANYTIME     Who are you requesting to speak with (clinical staff, provider,  specific staff member): CLINICAL    Do you know the name of the person who called: CLINICAL     What was the call regarding: STATES HE IS STILL FEELING BAD AND WOULD LIKE TO BE ADVISED STATES HE DOES NEED A DRS EXCUSE FOR THE DATES December 6 AND 7 STATES HE HAS A FEVER STATES HE HAS HAD COVID     HYDROGEAR   FAX NUMBER -STATES HE HAS GIVEN YOU THE FAX NUMBER     Do you require a callback: YES STATES HE WOULD LIKE TO BE ADVISED ABOUT HIS SYMPTOMS AND THE DR EXCUSE

## 2022-12-07 NOTE — TELEPHONE ENCOUNTER
Called pt back to notify that Estee ok'd excuse to be off the rest of the week, pt would like faxed to [facility].    Excuse updated and faxed.

## 2022-12-12 ENCOUNTER — TELEPHONE (OUTPATIENT)
Dept: FAMILY MEDICINE CLINIC | Facility: CLINIC | Age: 47
End: 2022-12-12

## 2022-12-12 NOTE — TELEPHONE ENCOUNTER
Caller: Jagdish Kulkarni    Relationship: Self    Best call back number: [phone]    What form or medical record are you requesting: DOCTORS NOTE    Who is requesting this form or medical record from you: SELF/WORK    How would you like to receive the form or medical records (pick-up, mail, fax): FAX  If fax, what is the fax number: [phone]    Timeframe paperwork needed: AS SOON AS POSSIBLE    Additional notes: PATIENT TESTED POSITIVE FOR COVID LAST WEEK. PATIENT IS STILL RUNNING A LOW GRADE FEVER (99.8 DEGREES) AND HIS WORK WILL NOT LET HIM COME BACK UNTIL HE IS FEVER FREE. PATIENT NEEDS A DOCTORS NOTE FOR TODAY, 12.12.22. PATIENT IS GOING TO TRY AND GO BACK INTO WORK TOMORROW BUT WILL CALL BACK IF HE NEEDS ANOTHER NOTE. PLEASE SEND TO [facility] IN [place], [place].     PLEASE CALL TO DISCUSS HOW HE CAN GET RID OF THE FEVER.

## 2022-12-14 ENCOUNTER — TELEPHONE (OUTPATIENT)
Dept: FAMILY MEDICINE CLINIC | Facility: CLINIC | Age: 47
End: 2022-12-14

## 2022-12-14 NOTE — TELEPHONE ENCOUNTER
Caller: Jagdish Kulkarni    Relationship: Self    Best call back number: [phone]    What form or medical record are you requesting: work excuse 12/14/22    Who is requesting this form or medical record from you: self     How would you like to receive the form or medical records (pick-up, mail, fax):      Timeframe paperwork needed: asap    Patient states he is still not feeling the best and not able to return to work

## 2022-12-15 ENCOUNTER — TELEMEDICINE (OUTPATIENT)
Dept: FAMILY MEDICINE CLINIC | Facility: CLINIC | Age: 47
End: 2022-12-15

## 2022-12-15 DIAGNOSIS — R11.0 NAUSEA: Primary | ICD-10-CM

## 2022-12-15 DIAGNOSIS — R09.81 NASAL CONGESTION: ICD-10-CM

## 2022-12-15 PROCEDURE — 99213 OFFICE O/P EST LOW 20 MIN: CPT | Performed by: NURSE PRACTITIONER

## 2022-12-15 RX ORDER — ONDANSETRON 8 MG/1
8 TABLET, ORALLY DISINTEGRATING ORAL EVERY 8 HOURS PRN
Qty: 30 TABLET | Refills: 0 | Status: SHIPPED | OUTPATIENT
Start: 2022-12-15

## 2022-12-15 RX ORDER — METHYLPREDNISOLONE 4 MG/1
TABLET ORAL
Qty: 1 EACH | Refills: 0 | Status: SHIPPED | OUTPATIENT
Start: 2022-12-15 | End: 2023-02-17

## 2022-12-15 NOTE — PROGRESS NOTES
Chief Complaint   Patient presents with   • Fever   • Nausea        Subjective   Jagdish Kulkarni is a 47 y.o. male who presents today for fever and nausea.     HPI   Patient has consented to a video visit today and I have reviewed this consent.   He states that he has been sick since being diagnosed with covid on 12/1/22. He has felt poorly all month. He is complaining of nausea without vomiting and a low grade fever. He has no abdominal pain or change in bowel habits. He also complains of some nasal congestion over the last two days.     Allergies   Allergen Reactions   • Other Other (See Comments)     Oral steroids:  Personality changes   • Oxycontin [Oxycodone] Nausea And Vomiting and Other (See Comments)     Nightmares          OBJECTIVE:  There were no vitals filed for this visit.  Physical Exam  No physical exam due to video visit.             ASSESSMENT/ PLAN:    Diagnoses and all orders for this visit:    1. Nausea (Primary)  -     ondansetron ODT (ZOFRAN-ODT) 8 MG disintegrating tablet; Place 1 tablet on the tongue Every 8 (Eight) Hours As Needed for Nausea or Vomiting.  Dispense: 30 tablet; Refill: 0    2. Nasal congestion  -     methylPREDNISolone (MEDROL) 4 MG dose pack; Take as directed on package instructions.  Dispense: 1 each; Refill: 0      Procedures     Management Plan:     An After Visit Summary was printed and given to the patient at discharge.    Follow-up: No follow-ups on file.         Viki uDbon, ELA 12/15/2022 14:04 CST  This note was electronically signed.

## 2022-12-21 ENCOUNTER — OFFICE VISIT (OUTPATIENT)
Dept: FAMILY MEDICINE CLINIC | Facility: CLINIC | Age: 47
End: 2022-12-21

## 2022-12-21 VITALS
OXYGEN SATURATION: 96 % | DIASTOLIC BLOOD PRESSURE: 80 MMHG | HEIGHT: 69 IN | HEART RATE: 57 BPM | WEIGHT: 235 LBS | BODY MASS INDEX: 34.8 KG/M2 | SYSTOLIC BLOOD PRESSURE: 119 MMHG | TEMPERATURE: 97.2 F

## 2022-12-21 DIAGNOSIS — R73.03 PRE-DIABETES: ICD-10-CM

## 2022-12-21 DIAGNOSIS — Z79.899 LONG-TERM USE OF HIGH-RISK MEDICATION: Primary | ICD-10-CM

## 2022-12-21 DIAGNOSIS — F41.8 SITUATIONAL ANXIETY: ICD-10-CM

## 2022-12-21 LAB
EXPIRATION DATE: NORMAL
HBA1C MFR BLD: 5.4 %
Lab: NORMAL

## 2022-12-21 PROCEDURE — 3044F HG A1C LEVEL LT 7.0%: CPT | Performed by: NURSE PRACTITIONER

## 2022-12-21 PROCEDURE — 99214 OFFICE O/P EST MOD 30 MIN: CPT | Performed by: NURSE PRACTITIONER

## 2022-12-21 PROCEDURE — 83036 HEMOGLOBIN GLYCOSYLATED A1C: CPT | Performed by: NURSE PRACTITIONER

## 2022-12-21 RX ORDER — ALPRAZOLAM 0.5 MG/1
0.5 TABLET ORAL 2 TIMES DAILY PRN
Qty: 30 TABLET | Refills: 0 | Status: SHIPPED | OUTPATIENT
Start: 2022-12-21 | End: 2023-02-10 | Stop reason: SDUPTHER

## 2022-12-21 NOTE — PROGRESS NOTES
"Chief Complaint  Drug / Alcohol Assessment (Xanax xuan), Prediabetes, and Hypertension (FU)    Subjective        Jagdish Kulkarni presents to [facility]  History of Present Illness  Here fro 3 months chronic follow up visit   Reports no new issues in the last 3 months  [facility]  Prediabetes controlled  Anxiety stable  Hypertension stable  Hyperlipidemia stable  Sleeping comes and goes, little dogs interfere with sleep and if get off work late          Objective   Vital Signs:  /80 (BP Location: Right arm, Patient Position: Sitting, Cuff Size: Large Adult)   Pulse 57   Temp 97.2 °F (36.2 °C)   Ht 175.3 cm (69\")   Wt 107 kg (235 lb)   SpO2 96%   BMI 34.70 kg/m²   Estimated body mass index is 34.7 kg/m² as calculated from the following:    Height as of this encounter: 175.3 cm (69\").    Weight as of this encounter: 107 kg (235 lb).    BMI is >= 30 and <35. (Class 1 Obesity). The following options were offered after discussion;: exercise counseling/recommendations and nutrition counseling/recommendations      Physical Exam  Vitals reviewed.   Constitutional:       General: He is not in acute distress.     Appearance: Normal appearance. He is obese. He is not ill-appearing.   HENT:      Head: Normocephalic and atraumatic.   Cardiovascular:      Rate and Rhythm: Normal rate and regular rhythm.      Heart sounds: Normal heart sounds. No murmur heard.  Musculoskeletal:      Comments: Limited spinal ROM due to pain.   Skin:     General: Skin is warm and dry.   Neurological:      Mental Status: He is alert.        Result Review :                Assessment and Plan   Diagnoses and all orders for this visit:    1. Long-term use of high-risk medication (Primary)  -     Compliance Drug Analysis, Ur - Urine, Clean Catch    2. Pre-diabetes  -     POCT glycated hemoglobin, total    3. Situational anxiety  -     ALPRAZolam (Xanax) 0.5 MG tablet; Take 1 " tablet by mouth 2 (Two) Times a Day As Needed for Anxiety.  Dispense: 30 tablet; Refill: 0      poct a1c 5.4- continue current treatment on prediabetes  [place], UDS, and controlled substance contract in [place]. Advised patient of risks associated with controlled substances including physical and mental dependence, abuse, and mental impairment. Advised patient to use least amount of possible and never overuse or share medications with other people. Patient states understanding of risks and instructions on proper use.   uds today   Refill xanax    Continue all other current treatment          Follow Up   Return in about 3 months (around 3/21/2023) for Recheck.  Patient was given instructions and counseling regarding his condition or for health maintenance advice. Please see specific information pulled into the AVS if appropriate.

## 2023-01-02 LAB — DRUGS UR: NORMAL

## 2023-02-10 DIAGNOSIS — F41.8 SITUATIONAL ANXIETY: ICD-10-CM

## 2023-02-10 RX ORDER — ALPRAZOLAM 0.5 MG/1
0.5 TABLET ORAL 2 TIMES DAILY PRN
Qty: 30 TABLET | Refills: 1 | Status: SHIPPED | OUTPATIENT
Start: 2023-02-10

## 2023-02-10 NOTE — TELEPHONE ENCOUNTER
Rx Refill Note  Requested Prescriptions     Pending Prescriptions Disp Refills   • ALPRAZolam (Xanax) 0.5 MG tablet 30 tablet 0     Sig: Take 1 tablet by mouth 2 (Two) Times a Day As Needed for Anxiety.      Last office visit with prescribing clinician: 12/1/2022   Last telemedicine visit with prescribing clinician: Visit date not found   Next office visit with prescribing clinician: Visit date not found  Requirements are up to date      Lay Chang MA  02/10/23, 10:01 CST

## 2023-02-17 ENCOUNTER — OFFICE VISIT (OUTPATIENT)
Dept: FAMILY MEDICINE CLINIC | Facility: CLINIC | Age: 48
End: 2023-02-17
Payer: COMMERCIAL

## 2023-02-17 VITALS
HEIGHT: 69 IN | SYSTOLIC BLOOD PRESSURE: 134 MMHG | OXYGEN SATURATION: 98 % | TEMPERATURE: 97.7 F | BODY MASS INDEX: 33.33 KG/M2 | HEART RATE: 102 BPM | WEIGHT: 225 LBS | DIASTOLIC BLOOD PRESSURE: 86 MMHG

## 2023-02-17 DIAGNOSIS — M51.34 DDD (DEGENERATIVE DISC DISEASE), THORACIC: ICD-10-CM

## 2023-02-17 DIAGNOSIS — M62.838 MUSCLE SPASMS OF LOWER EXTREMITY, UNSPECIFIED LATERALITY: ICD-10-CM

## 2023-02-17 DIAGNOSIS — M67.912 BILATERAL SHOULDER TENDINOPATHY: Primary | ICD-10-CM

## 2023-02-17 DIAGNOSIS — J30.1 SEASONAL ALLERGIC RHINITIS DUE TO POLLEN: ICD-10-CM

## 2023-02-17 DIAGNOSIS — M67.911 BILATERAL SHOULDER TENDINOPATHY: Primary | ICD-10-CM

## 2023-02-17 PROCEDURE — 99214 OFFICE O/P EST MOD 30 MIN: CPT | Performed by: NURSE PRACTITIONER

## 2023-02-17 PROCEDURE — 96372 THER/PROPH/DIAG INJ SC/IM: CPT | Performed by: NURSE PRACTITIONER

## 2023-02-17 RX ORDER — TRIAMCINOLONE ACETONIDE 40 MG/ML
80 INJECTION, SUSPENSION INTRA-ARTICULAR; INTRAMUSCULAR ONCE
Status: COMPLETED | OUTPATIENT
Start: 2023-02-17 | End: 2023-02-17

## 2023-02-17 RX ORDER — CETIRIZINE HYDROCHLORIDE 10 MG/1
10 TABLET ORAL DAILY PRN
Qty: 30 TABLET | Refills: 5 | Status: SHIPPED | OUTPATIENT
Start: 2023-02-17

## 2023-02-17 RX ADMIN — TRIAMCINOLONE ACETONIDE 80 MG: 40 INJECTION, SUSPENSION INTRA-ARTICULAR; INTRAMUSCULAR at 14:46

## 2023-02-17 NOTE — PROGRESS NOTES
"Chief Complaint  Back Pain (Upper back radiates into left shoulder, sore pain )    Subjective        Jagdish Kulkarni presents to [facility]  History of Present Illness  BACK PAIN:  Patient has been lifting 45 lb parts at work repeatedly and has worsened his back pain over the past several days.  In the past, a steroid injection has worked well to alleviate his pain, last injection in September 2022.  He is hoping to get an injection today and rest for 3 days.  SHOULDER PAIN:  His shoulder pain has also increased over the past week or so, \"probably due to lifting at work.\"  He denies pain at rest. The steroid generally helps with this as well.  ALLERGIES:  Patient is requesting a refill of his zyrtec as he is starting to have periods of allergic rhinitis.    Objective   Vital Signs:  /86 (BP Location: Left arm, Patient Position: Sitting, Cuff Size: Large Adult)   Pulse 102   Temp 97.7 °F (36.5 °C)   Ht 175.3 cm (69\")   Wt 102 kg (225 lb)   SpO2 98%   BMI 33.23 kg/m²   Estimated body mass index is 33.23 kg/m² as calculated from the following:    Height as of this encounter: 175.3 cm (69\").    Weight as of this encounter: 102 kg (225 lb).    BMI is >= 30 and <35. (Class 1 Obesity). The following options were offered after discussion;: exercise counseling/recommendations and nutrition counseling/recommendations      Physical Exam  Vitals and nursing note reviewed.   Constitutional:       General: He is not in acute distress.     Appearance: Normal appearance. He is obese. He is not ill-appearing.   HENT:      Head: Normocephalic and atraumatic.   Cardiovascular:      Rate and Rhythm: Regular rhythm. Tachycardia present.      Heart sounds: Normal heart sounds. No murmur heard.  Pulmonary:      Effort: Pulmonary effort is normal.      Breath sounds: Normal breath sounds.   Musculoskeletal:         General: Tenderness present.      Comments: Mid back spinal tenderness along with " muscle spasm in the thoracic left side area.   Skin:     General: Skin is warm and dry.   Neurological:      Mental Status: He is alert and oriented to person, place, and time.        Result Review :                Assessment and Plan   Diagnoses and all orders for this visit:    1. Bilateral shoulder tendinopathy (Primary)  -     triamcinolone acetonide (KENALOG-40) injection 80 mg    2. DDD (degenerative disc disease), thoracic  -     triamcinolone acetonide (KENALOG-40) injection 80 mg    3. Muscle spasms of lower extremity, unspecified laterality  -     triamcinolone acetonide (KENALOG-40) injection 80 mg    4. Seasonal allergic rhinitis due to pollen  -     cetirizine (zyrTEC) 10 MG tablet; Take 1 tablet by mouth Daily As Needed for Allergies or Rhinitis.  Dispense: 30 tablet; Refill: 5             Follow Up   Return if symptoms worsen or fail to improve.  Patient was given instructions and counseling regarding his condition or for health maintenance advice. Please see specific information pulled into the AVS if appropriate.     ELA Smiley  This note is electronically signed.

## 2023-03-10 ENCOUNTER — TELEPHONE (OUTPATIENT)
Dept: FAMILY MEDICINE CLINIC | Facility: CLINIC | Age: 48
End: 2023-03-10
Payer: COMMERCIAL

## 2023-03-16 DIAGNOSIS — I10 ESSENTIAL HYPERTENSION: ICD-10-CM

## 2023-03-16 RX ORDER — LISINOPRIL 5 MG/1
TABLET ORAL
Qty: 90 TABLET | Refills: 1 | Status: SHIPPED | OUTPATIENT
Start: 2023-03-16

## 2023-03-16 NOTE — TELEPHONE ENCOUNTER
Rx Refill Note  Requested Prescriptions     Pending Prescriptions Disp Refills   • lisinopril (PRINIVIL,ZESTRIL) 5 MG tablet [Pharmacy Med Name: LISINOPRIL 5MG TABS] 90 tablet 1     Sig: TAKE ONE TABLET BY MOUTH EVERY DAY      Last office visit with prescribing clinician: 2/17/2023   Last telemedicine visit with prescribing clinician: Visit date not found   Next office visit with prescribing clinician: Visit date not found       Protocol met      April Godwin MA  03/16/23, 13:47 CDT

## 2023-04-12 DIAGNOSIS — F41.8 SITUATIONAL ANXIETY: ICD-10-CM

## 2023-04-12 RX ORDER — ALPRAZOLAM 0.5 MG/1
TABLET ORAL
Qty: 60 TABLET | Refills: 1 | Status: SHIPPED | OUTPATIENT
Start: 2023-04-12

## 2023-04-12 NOTE — TELEPHONE ENCOUNTER
Rx Refill Note  Requested Prescriptions     Pending Prescriptions Disp Refills   • ALPRAZolam (XANAX) 0.5 MG tablet [Pharmacy Med Name: ALPRAZOLAM 0.5MG TABS] 60 tablet 1     Sig: TAKE ONE TABLET BY MOUTH TWICE A DAY AS NEEDED FOR ANXIETY      Last office visit with prescribing clinician: 2/17/2023   Last telemedicine visit with prescribing clinician: Visit date not found   Next office visit with prescribing clinician: Visit date not found  Requirements are up to date    Lay Chang MA  04/12/23, 14:18 CDT

## 2023-04-12 NOTE — TELEPHONE ENCOUNTER
Caller: Jagdish Kulkarni    Relationship: Self    Best call back number: [phone]    Requested Prescriptions:   Requested Prescriptions     Pending Prescriptions Disp Refills   • ALPRAZolam (XANAX) 0.5 MG tablet [Pharmacy Med Name: ALPRAZOLAM 0.5MG TABS] 60 tablet 1     Sig: TAKE ONE TABLET BY MOUTH TWICE A DAY AS NEEDED FOR ANXIETY        Pharmacy where request should be sent: [facility] [address] [phone] [facility] [phone]      Last office visit with prescribing clinician: 2/17/2023   Last telemedicine visit with prescribing clinician: Visit date not found   Next office visit with prescribing clinician: Visit date not found     Additional details provided by patient: COMPLETELY OUT    Does the patient have less than a 3 day supply:  [x] Yes  [] No    Would you like a call back once the refill request has been completed: [x] Yes [] No    If the office needs to give you a call back, can they leave a voicemail: [x] Yes [] No    Andi Uribe Rep   04/12/23 13:23 CDT          25-Jul-2019 22:08

## 2023-04-25 DIAGNOSIS — E78.2 MIXED HYPERLIPIDEMIA: ICD-10-CM

## 2023-04-25 RX ORDER — SIMVASTATIN 20 MG
TABLET ORAL
Qty: 90 TABLET | Refills: 1 | Status: SHIPPED | OUTPATIENT
Start: 2023-04-25

## 2023-04-25 NOTE — TELEPHONE ENCOUNTER
Rx Refill Note  Requested Prescriptions     Pending Prescriptions Disp Refills   • simvastatin (ZOCOR) 20 MG tablet [Pharmacy Med Name: SIMVASTATIN 20MG TABS] 90 tablet 1     Sig: TAKE ONE TABLET BY MOUTH EVERY DAY      Last office visit with prescribing clinician: 2/17/2023  Last telemedicine visit with prescribing clinician: Visit date not found   Next office visit with prescribing clinician: Visit date not found     Lay Chang MA  04/25/23, 14:09 CDT

## 2023-05-17 ENCOUNTER — TELEPHONE (OUTPATIENT)
Dept: FAMILY MEDICINE CLINIC | Facility: CLINIC | Age: 48
End: 2023-05-17

## 2023-05-17 NOTE — TELEPHONE ENCOUNTER
Caller: Jagdish Kulkarni    Relationship to patient: Self    Best call back number: [phone]    Patient is needing:     PATIENT CALLED AND ADVISED THAT PER PAIN MANAGEMENT PATIENT HAS TO DISCONTINUE TAKING ALPRAZolam (XANAX) 0.5 MG tablet DUE TO MEDICATION PRESCRIBED

## 2023-05-21 DIAGNOSIS — K21.9 GASTROESOPHAGEAL REFLUX DISEASE, UNSPECIFIED WHETHER ESOPHAGITIS PRESENT: ICD-10-CM

## 2023-05-22 RX ORDER — FAMOTIDINE 40 MG/1
TABLET, FILM COATED ORAL
Qty: 90 TABLET | Refills: 1 | Status: SHIPPED | OUTPATIENT
Start: 2023-05-22

## 2023-05-22 RX ORDER — OMEPRAZOLE 20 MG/1
CAPSULE, DELAYED RELEASE ORAL
Qty: 90 CAPSULE | Refills: 1 | Status: SHIPPED | OUTPATIENT
Start: 2023-05-22

## 2023-05-22 NOTE — TELEPHONE ENCOUNTER
Rx Refill Note  Requested Prescriptions     Pending Prescriptions Disp Refills    omeprazole (priLOSEC) 20 MG capsule [Pharmacy Med Name: OMEPRAZOLE 20MG CPDR] 90 capsule 1     Sig: TAKE ONE CAPSULE BY MOUTH EVERY DAY    famotidine (PEPCID) 40 MG tablet [Pharmacy Med Name: FAMOTIDINE 40MG TABS] 90 tablet 1     Sig: TAKE ONE TABLET BY MOUTH EVERY DAY      Last office visit with prescribing clinician: 2/17/2023     Next office visit with prescribing clinician: Visit date not found       Lay Chang MA  05/22/23, 08:44 CDT

## 2023-06-05 DIAGNOSIS — R73.03 PRE-DIABETES: ICD-10-CM

## 2023-06-05 NOTE — TELEPHONE ENCOUNTER
Rx Refill Note  Requested Prescriptions     Pending Prescriptions Disp Refills    metFORMIN (GLUCOPHAGE) 500 MG tablet [Pharmacy Med Name: METFORMIN HCL 500MG TABS] 90 tablet 1     Sig: TAKE ONE TABLET BY MOUTH EVERY DAY WITH BREAKFAST      Last office visit with prescribing clinician: 2/17/2023   Last telemedicine visit with prescribing clinician: 12/15/2022   Next office visit with prescribing clinician: Visit date not found         April Godwin MA  06/05/23, 13:41 CDT]

## 2023-07-31 DIAGNOSIS — I10 ESSENTIAL HYPERTENSION: ICD-10-CM

## 2023-07-31 RX ORDER — LISINOPRIL 5 MG/1
TABLET ORAL
Qty: 90 TABLET | Refills: 1 | Status: SHIPPED | OUTPATIENT
Start: 2023-07-31

## 2023-08-15 ENCOUNTER — OFFICE VISIT (OUTPATIENT)
Dept: FAMILY MEDICINE CLINIC | Facility: CLINIC | Age: 48
End: 2023-08-15
Payer: COMMERCIAL

## 2023-08-15 VITALS
SYSTOLIC BLOOD PRESSURE: 105 MMHG | OXYGEN SATURATION: 98 % | BODY MASS INDEX: 33.38 KG/M2 | TEMPERATURE: 97.8 F | DIASTOLIC BLOOD PRESSURE: 70 MMHG | HEIGHT: 69 IN | HEART RATE: 77 BPM | WEIGHT: 225.4 LBS

## 2023-08-15 DIAGNOSIS — G89.29 CHRONIC PAIN OF LEFT THUMB: ICD-10-CM

## 2023-08-15 DIAGNOSIS — R73.03 PRE-DIABETES: ICD-10-CM

## 2023-08-15 DIAGNOSIS — E78.2 MIXED HYPERLIPIDEMIA: ICD-10-CM

## 2023-08-15 DIAGNOSIS — M79.645 CHRONIC PAIN OF LEFT THUMB: ICD-10-CM

## 2023-08-15 DIAGNOSIS — Z00.00 ANNUAL PHYSICAL EXAM: Primary | ICD-10-CM

## 2023-08-15 DIAGNOSIS — I10 ESSENTIAL HYPERTENSION: ICD-10-CM

## 2023-08-15 DIAGNOSIS — M1A.9XX1 GOUTY ARTHROPATHY WITH TOPHI: Primary | ICD-10-CM

## 2023-08-15 PROCEDURE — 3074F SYST BP LT 130 MM HG: CPT | Performed by: NURSE PRACTITIONER

## 2023-08-15 PROCEDURE — 1160F RVW MEDS BY RX/DR IN RCRD: CPT | Performed by: NURSE PRACTITIONER

## 2023-08-15 PROCEDURE — 1159F MED LIST DOCD IN RCRD: CPT | Performed by: NURSE PRACTITIONER

## 2023-08-15 PROCEDURE — 99212 OFFICE O/P EST SF 10 MIN: CPT | Performed by: NURSE PRACTITIONER

## 2023-08-15 PROCEDURE — 3078F DIAST BP <80 MM HG: CPT | Performed by: NURSE PRACTITIONER

## 2023-08-15 NOTE — PROGRESS NOTES
"Chief Complaint  Hand Pain (Left thumb, sore knot )    Subjective        Jagdish Kulkarni presents to [facility]  History of Present Illness  Patient has noticed a \"hard knot\" on his left thumb DIP joint.  It is tender to palpation and with ROM. This is the first time he has sought medical care. No treatment at home has been attempted.    Objective   Vital Signs:  /70 (BP Location: Right arm, Patient Position: Sitting, Cuff Size: Large Adult)   Pulse 77   Temp 97.8 øF (36.6 øC)   Ht 175.3 cm (69\")   Wt 102 kg (225 lb 6.4 oz)   SpO2 98%   BMI 33.29 kg/mý   Estimated body mass index is 33.29 kg/mý as calculated from the following:    Height as of this encounter: 175.3 cm (69\").    Weight as of this encounter: 102 kg (225 lb 6.4 oz).    BMI is >= 30 and <35. (Class 1 Obesity). The following options were offered after discussion;: exercise counseling/recommendations and nutrition counseling/recommendations      Physical Exam  Vitals and nursing note reviewed.   Constitutional:       General: He is not in acute distress.     Appearance: Normal appearance. He is obese. He is not ill-appearing.   HENT:      Head: Normocephalic and atraumatic.   Cardiovascular:      Rate and Rhythm: Normal rate and regular rhythm.      Heart sounds: Normal heart sounds.   Pulmonary:      Effort: Pulmonary effort is normal.      Breath sounds: Normal breath sounds.   Musculoskeletal:         General: Tenderness and deformity present.      Comments: Left lateral thumb DIP with nodule on the joint.  Very tender to touch.   Skin:     General: Skin is warm and dry.   Neurological:      Mental Status: He is alert and oriented to person, place, and time.      Result Review :                Assessment and Plan   Diagnoses and all orders for this visit:    1. Gouty arthropathy with tophi (Primary)    2. Chronic pain of left thumb    Patient declines imaging at this time. He agrees to call with any further " issues.         Follow Up   Return in about 10 days (around 8/25/2023) for Annual physical (labs today); AM appt please.  Patient was given instructions and counseling regarding his condition or for health maintenance advice. Please see specific information pulled into the AVS if appropriate.     ELA Smiley  This note is electronically signed.

## 2023-08-16 LAB
ALBUMIN SERPL-MCNC: 4.4 G/DL (ref 4.1–5.1)
ALBUMIN/GLOB SERPL: 2.1 {RATIO} (ref 1.2–2.2)
ALP SERPL-CCNC: 68 IU/L (ref 44–121)
ALT SERPL-CCNC: 15 IU/L (ref 0–44)
AST SERPL-CCNC: 18 IU/L (ref 0–40)
BASOPHILS # BLD AUTO: 0.1 X10E3/UL (ref 0–0.2)
BASOPHILS NFR BLD AUTO: 1 %
BILIRUB SERPL-MCNC: 0.4 MG/DL (ref 0–1.2)
BUN SERPL-MCNC: 15 MG/DL (ref 6–24)
BUN/CREAT SERPL: 19 (ref 9–20)
CALCIUM SERPL-MCNC: 9.6 MG/DL (ref 8.7–10.2)
CHLORIDE SERPL-SCNC: 104 MMOL/L (ref 96–106)
CHOLEST SERPL-MCNC: 203 MG/DL (ref 100–199)
CO2 SERPL-SCNC: 21 MMOL/L (ref 20–29)
CREAT SERPL-MCNC: 0.8 MG/DL (ref 0.76–1.27)
EGFRCR SERPLBLD CKD-EPI 2021: 109 ML/MIN/1.73
EOSINOPHIL # BLD AUTO: 0.1 X10E3/UL (ref 0–0.4)
EOSINOPHIL NFR BLD AUTO: 2 %
ERYTHROCYTE [DISTWIDTH] IN BLOOD BY AUTOMATED COUNT: 13 % (ref 11.6–15.4)
GLOBULIN SER CALC-MCNC: 2.1 G/DL (ref 1.5–4.5)
GLUCOSE SERPL-MCNC: 106 MG/DL (ref 70–99)
HBA1C MFR BLD: 5.7 % (ref 4.8–5.6)
HCT VFR BLD AUTO: 42.1 % (ref 37.5–51)
HDLC SERPL-MCNC: 43 MG/DL
HGB BLD-MCNC: 14.3 G/DL (ref 13–17.7)
IMM GRANULOCYTES # BLD AUTO: 0 X10E3/UL (ref 0–0.1)
IMM GRANULOCYTES NFR BLD AUTO: 0 %
LDLC SERPL CALC-MCNC: 129 MG/DL (ref 0–99)
LDLC/HDLC SERPL: 3 RATIO (ref 0–3.6)
LYMPHOCYTES # BLD AUTO: 2.3 X10E3/UL (ref 0.7–3.1)
LYMPHOCYTES NFR BLD AUTO: 34 %
MCH RBC QN AUTO: 30 PG (ref 26.6–33)
MCHC RBC AUTO-ENTMCNC: 34 G/DL (ref 31.5–35.7)
MCV RBC AUTO: 88 FL (ref 79–97)
MONOCYTES # BLD AUTO: 0.5 X10E3/UL (ref 0.1–0.9)
MONOCYTES NFR BLD AUTO: 7 %
NEUTROPHILS # BLD AUTO: 3.8 X10E3/UL (ref 1.4–7)
NEUTROPHILS NFR BLD AUTO: 56 %
PLATELET # BLD AUTO: 244 X10E3/UL (ref 150–450)
POTASSIUM SERPL-SCNC: 3.7 MMOL/L (ref 3.5–5.2)
PROT SERPL-MCNC: 6.5 G/DL (ref 6–8.5)
RBC # BLD AUTO: 4.76 X10E6/UL (ref 4.14–5.8)
SODIUM SERPL-SCNC: 143 MMOL/L (ref 134–144)
T4 SERPL-MCNC: 8.5 UG/DL (ref 4.5–12)
TRIGL SERPL-MCNC: 174 MG/DL (ref 0–149)
TSH SERPL DL<=0.005 MIU/L-ACNC: 1.65 UIU/ML (ref 0.45–4.5)
VLDLC SERPL CALC-MCNC: 31 MG/DL (ref 5–40)
WBC # BLD AUTO: 6.7 X10E3/UL (ref 3.4–10.8)

## 2023-08-25 ENCOUNTER — OFFICE VISIT (OUTPATIENT)
Dept: FAMILY MEDICINE CLINIC | Facility: CLINIC | Age: 48
End: 2023-08-25
Payer: COMMERCIAL

## 2023-08-25 VITALS
BODY MASS INDEX: 34.16 KG/M2 | WEIGHT: 230.6 LBS | SYSTOLIC BLOOD PRESSURE: 114 MMHG | DIASTOLIC BLOOD PRESSURE: 74 MMHG | HEIGHT: 69 IN | TEMPERATURE: 97.3 F | HEART RATE: 65 BPM | OXYGEN SATURATION: 100 %

## 2023-08-25 DIAGNOSIS — Z23 NEED FOR TDAP VACCINATION: ICD-10-CM

## 2023-08-25 DIAGNOSIS — K21.9 GASTROESOPHAGEAL REFLUX DISEASE, UNSPECIFIED WHETHER ESOPHAGITIS PRESENT: ICD-10-CM

## 2023-08-25 DIAGNOSIS — M54.50 LEFT-SIDED LOW BACK PAIN WITHOUT SCIATICA, UNSPECIFIED CHRONICITY: ICD-10-CM

## 2023-08-25 DIAGNOSIS — E66.9 OBESITY (BMI 30.0-34.9): ICD-10-CM

## 2023-08-25 DIAGNOSIS — E78.2 MIXED HYPERLIPIDEMIA: ICD-10-CM

## 2023-08-25 DIAGNOSIS — F51.01 PRIMARY INSOMNIA: ICD-10-CM

## 2023-08-25 DIAGNOSIS — Z00.00 ANNUAL PHYSICAL EXAM: Primary | ICD-10-CM

## 2023-08-25 LAB
BILIRUB BLD-MCNC: NEGATIVE MG/DL
CLARITY, POC: CLEAR
COLOR UR: YELLOW
GLUCOSE UR STRIP-MCNC: NEGATIVE MG/DL
KETONES UR QL: NEGATIVE
LEUKOCYTE EST, POC: NEGATIVE
NITRITE UR-MCNC: NEGATIVE MG/ML
PH UR: 5.5 [PH] (ref 5–8)
PROT UR STRIP-MCNC: NEGATIVE MG/DL
RBC # UR STRIP: NEGATIVE /UL
SP GR UR: 1.02 (ref 1–1.03)
UROBILINOGEN UR QL: NORMAL

## 2023-08-25 RX ORDER — SIMVASTATIN 40 MG
40 TABLET ORAL DAILY
Qty: 90 TABLET | Refills: 3 | Status: SHIPPED | OUTPATIENT
Start: 2023-08-25

## 2023-08-25 RX ORDER — TRAZODONE HYDROCHLORIDE 50 MG/1
50 TABLET ORAL NIGHTLY PRN
Qty: 90 TABLET | Refills: 1 | Status: SHIPPED | OUTPATIENT
Start: 2023-08-25

## 2023-08-25 RX ORDER — FAMOTIDINE 40 MG/1
40 TABLET, FILM COATED ORAL DAILY
Qty: 90 TABLET | Refills: 1 | Status: SHIPPED | OUTPATIENT
Start: 2023-08-25

## 2023-08-25 NOTE — PROGRESS NOTES
"Chief Complaint  Annual Exam and Back Pain (Low back pain, left side )    Subjective        Jagdish Kulkarni presents to [facility]  History of Present Illness  Low back pain on the left side, especially on the flank.    Objective   Vital Signs:  /74 (BP Location: Left arm, Patient Position: Sitting, Cuff Size: Large Adult)   Pulse 65   Temp 97.3 øF (36.3 øC)   Ht 175.3 cm (69\")   Wt 105 kg (230 lb 9.6 oz)   SpO2 100%   BMI 34.05 kg/mý   Estimated body mass index is 34.05 kg/mý as calculated from the following:    Height as of this encounter: 175.3 cm (69\").    Weight as of this encounter: 105 kg (230 lb 9.6 oz).    BMI is >= 30 and <35. (Class 1 Obesity). The following options were offered after discussion;: exercise counseling/recommendations and nutrition counseling/recommendations      Physical Exam  Vitals and nursing note reviewed.   Constitutional:       General: He is not in acute distress.     Appearance: Normal appearance. He is obese. He is not ill-appearing.   HENT:      Head: Normocephalic and atraumatic.   Cardiovascular:      Rate and Rhythm: Normal rate and regular rhythm.      Heart sounds: Normal heart sounds. No murmur heard.  Pulmonary:      Effort: Pulmonary effort is normal.      Breath sounds: Normal breath sounds.   Musculoskeletal:      Cervical back: Neck supple.   Skin:     General: Skin is warm and dry.   Neurological:      Mental Status: He is alert and oriented to person, place, and time.      Result Review :  The following data was reviewed by: ELA Smiley on 08/25/2023:  CMP          8/15/2023    07:58   CMP   Glucose 106    BUN 15    Creatinine 0.80    Sodium 143    Potassium 3.7    Chloride 104    Calcium 9.6    Total Protein 6.5    Albumin 4.4    Globulin 2.1    Total Bilirubin 0.4    Alkaline Phosphatase 68    AST (SGOT) 18    ALT (SGPT) 15    BUN/Creatinine Ratio 19      CBC          8/15/2023    07:58   CBC   WBC 6.7    RBC " 4.76    Hemoglobin 14.3    Hematocrit 42.1    MCV 88    MCH 30.0    MCHC 34.0    RDW 13.0    Platelets 244      Lipid Panel          8/15/2023    07:58   Lipid Panel   Total Cholesterol 203    Triglycerides 174    HDL Cholesterol 43    VLDL Cholesterol 31    LDL Cholesterol  129    LDL/HDL Ratio 3.0      TSH          8/15/2023    07:58   TSH   TSH 1.650                Assessment and Plan   Diagnoses and all orders for this visit:    1. Annual physical exam (Primary)    2. Left-sided low back pain without sciatica, unspecified chronicity  -     POCT urinalysis dipstick, multipro    3. Mixed hyperlipidemia  -     simvastatin (ZOCOR) 40 MG tablet; Take 1 tablet by mouth Daily.  Dispense: 90 tablet; Refill: 3    4. Gastroesophageal reflux disease, unspecified whether esophagitis present  -     famotidine (PEPCID) 40 MG tablet; Take 1 tablet by mouth Daily.  Dispense: 90 tablet; Refill: 1    5. Primary insomnia  -     traZODone (DESYREL) 50 MG tablet; Take 1 tablet by mouth At Night As Needed for Sleep.  Dispense: 90 tablet; Refill: 1    6. Need for Tdap vaccination  -     Tdap Vaccine => 8yo IM (BOOSTRIX)    7. Obesity (BMI 30.0-34.9)    Patient encouraged to partake of healthy diet rich in fresh fruits and vegetables as well as lean proteins.  Patient encouraged to participate in daily exercise with goal of 30 min sustained activity, as tolerated.         Follow Up   Return in about 6 months (around 2/25/2024) for diabetes follow up with labs prior.  Patient was given instructions and counseling regarding his condition or for health maintenance advice. Please see specific information pulled into the AVS if appropriate.     ELA Smiley  This note is electronically signed.

## 2023-09-21 DIAGNOSIS — I10 ESSENTIAL HYPERTENSION: ICD-10-CM

## 2023-09-21 NOTE — TELEPHONE ENCOUNTER
Rx Refill Note  Requested Prescriptions     Pending Prescriptions Disp Refills    lisinopril (PRINIVIL,ZESTRIL) 5 MG tablet [Pharmacy Med Name: LISINOPRIL 5MG TABS] 90 tablet 1     Sig: TAKE ONE TABLET BY MOUTH EVERY DAY      Last office visit with prescribing clinician: 8/25/2023         Jeri Pereyra MA  09/21/23, 16:53 CDT

## 2023-09-22 RX ORDER — LISINOPRIL 5 MG/1
TABLET ORAL
Qty: 90 TABLET | Refills: 1 | Status: SHIPPED | OUTPATIENT
Start: 2023-09-22

## 2023-09-23 NOTE — TELEPHONE ENCOUNTER
Pt was in office, requesting refill of medication pended to this encounter. Pt asked that this be sent to [facility].    Yes

## 2023-09-29 RX ORDER — MELOXICAM 15 MG/1
TABLET ORAL
Qty: 30 TABLET | Refills: 1 | Status: SHIPPED | OUTPATIENT
Start: 2023-09-29

## 2023-09-29 NOTE — TELEPHONE ENCOUNTER
Rx Refill Note  Requested Prescriptions     Pending Prescriptions Disp Refills    meloxicam (MOBIC) 15 MG tablet [Pharmacy Med Name: MELOXICAM 15MG TABS] 30 tablet 1     Sig: TAKE ONE TABLET BY MOUTH EVERY DAY         Jeri Pereyra MA  09/29/23, 09:00 CDT

## 2023-10-09 ENCOUNTER — OFFICE VISIT (OUTPATIENT)
Dept: FAMILY MEDICINE CLINIC | Facility: CLINIC | Age: 48
End: 2023-10-09
Payer: COMMERCIAL

## 2023-10-09 VITALS
SYSTOLIC BLOOD PRESSURE: 120 MMHG | OXYGEN SATURATION: 97 % | HEART RATE: 64 BPM | BODY MASS INDEX: 34.9 KG/M2 | WEIGHT: 235.6 LBS | DIASTOLIC BLOOD PRESSURE: 80 MMHG | TEMPERATURE: 98.7 F | HEIGHT: 69 IN

## 2023-10-09 DIAGNOSIS — L60.0 INGROWN RIGHT GREATER TOENAIL: ICD-10-CM

## 2023-10-09 DIAGNOSIS — M51.36 DDD (DEGENERATIVE DISC DISEASE), LUMBAR: ICD-10-CM

## 2023-10-09 DIAGNOSIS — G89.29 CHRONIC BILATERAL LOW BACK PAIN WITHOUT SCIATICA: ICD-10-CM

## 2023-10-09 DIAGNOSIS — M54.50 CHRONIC BILATERAL LOW BACK PAIN WITHOUT SCIATICA: ICD-10-CM

## 2023-10-09 DIAGNOSIS — J01.41 ACUTE RECURRENT PANSINUSITIS: Primary | ICD-10-CM

## 2023-10-09 PROCEDURE — 99214 OFFICE O/P EST MOD 30 MIN: CPT | Performed by: NURSE PRACTITIONER

## 2023-10-09 PROCEDURE — 1160F RVW MEDS BY RX/DR IN RCRD: CPT | Performed by: NURSE PRACTITIONER

## 2023-10-09 PROCEDURE — 3074F SYST BP LT 130 MM HG: CPT | Performed by: NURSE PRACTITIONER

## 2023-10-09 PROCEDURE — 1159F MED LIST DOCD IN RCRD: CPT | Performed by: NURSE PRACTITIONER

## 2023-10-09 PROCEDURE — 3079F DIAST BP 80-89 MM HG: CPT | Performed by: NURSE PRACTITIONER

## 2023-10-09 RX ORDER — SULFAMETHOXAZOLE AND TRIMETHOPRIM 800; 160 MG/1; MG/1
1 TABLET ORAL 2 TIMES DAILY
Qty: 14 TABLET | Refills: 0 | Status: SHIPPED | OUTPATIENT
Start: 2023-10-09

## 2023-10-09 RX ORDER — HYDROCODONE BITARTRATE AND ACETAMINOPHEN 10; 325 MG/1; MG/1
1 TABLET ORAL EVERY 6 HOURS PRN
Qty: 90 TABLET | Refills: 0 | Status: SHIPPED | OUTPATIENT
Start: 2023-10-09

## 2023-10-09 RX ORDER — GABAPENTIN 600 MG/1
600 TABLET ORAL 3 TIMES DAILY
Qty: 90 TABLET | Refills: 0 | Status: SHIPPED | OUTPATIENT
Start: 2023-10-09

## 2023-10-09 NOTE — PROGRESS NOTES
"Chief Complaint  Sore Throat and Fatigue    Subjective        Hiltony Kulkarni presents to [facility]  Sore Throat     Fatigue  Associated symptoms include fatigue and a sore throat.     Patient complains of sinus issues and sore throat. Started getting worse yesterday and patient felt like he had fever and chills (no temp taken). Nasal congestion, pain across the maxillary sinuses and post nasal drainage.    Patient is between PM centers. His med was bridged last month by [facility] but his new PM appt is another month away. He needs to be bridged with his norco and gabapentin until he can be seen by them.    Patient has complaints of ingrown great toenail on the right.  He has been \"working on it\" and soaking it and it apparently is some better than it was last week. He had some bactrim at home and has been taking one daily.    Objective   Vital Signs:  /80 (BP Location: Left arm, Patient Position: Sitting, Cuff Size: Large Adult)   Pulse 64   Temp 98.7 øF (37.1 øC)   Ht 175.3 cm (69\")   Wt 107 kg (235 lb 9.6 oz)   SpO2 97%   BMI 34.79 kg/mý   Estimated body mass index is 34.79 kg/mý as calculated from the following:    Height as of this encounter: 175.3 cm (69\").    Weight as of this encounter: 107 kg (235 lb 9.6 oz).    BMI is >= 30 and <35. (Class 1 Obesity). The following options were offered after discussion;: exercise counseling/recommendations and nutrition counseling/recommendations      Physical Exam  Vitals and nursing note reviewed.   Constitutional:       General: He is not in acute distress.     Appearance: Normal appearance. He is obese. He is ill-appearing.   HENT:      Head: Normocephalic and atraumatic.      Right Ear: Ear canal normal.      Left Ear: Ear canal normal.      Ears:      Comments: Bilateral moderate serous otitis.     Nose: Congestion present.      Comments: Maxillary, frontal and ethmoid sinus tenderness to percussion.     Mouth/Throat:      Mouth: " Mucous membranes are moist.      Pharynx: Oropharynx is clear.      Comments: Evidence of post nasal drainage.  Cardiovascular:      Rate and Rhythm: Normal rate and regular rhythm.      Heart sounds: Normal heart sounds. No murmur heard.  Pulmonary:      Effort: Pulmonary effort is normal.      Breath sounds: Normal breath sounds.   Musculoskeletal:      Comments: Decreased spinal ROM secondary to pain.   Skin:     General: Skin is warm and dry.      Findings: Erythema present.      Comments: Right great toe with swelling surrounding the medial portion of the nail with erythema.  No active drainage.   Neurological:      Mental Status: He is alert and oriented to person, place, and time.        Result Review :                Assessment and Plan   Diagnoses and all orders for this visit:    1. Acute recurrent pansinusitis (Primary)  -     sulfamethoxazole-trimethoprim (Bactrim DS) 800-160 MG per tablet; Take 1 tablet by mouth 2 (Two) Times a Day.  Dispense: 14 tablet; Refill: 0    2. Chronic bilateral low back pain without sciatica  -     gabapentin (NEURONTIN) 600 MG tablet; Take 1 tablet by mouth 3 (Three) Times a Day.  Dispense: 90 tablet; Refill: 0  -     HYDROcodone-acetaminophen (NORCO)  MG per tablet; Take 1 tablet by mouth Every 6 (Six) Hours As Needed for Moderate Pain or Severe Pain.  Dispense: 90 tablet; Refill: 0    3. DDD (degenerative disc disease), lumbar  -     HYDROcodone-acetaminophen (NORCO)  MG per tablet; Take 1 tablet by mouth Every 6 (Six) Hours As Needed for Moderate Pain or Severe Pain.  Dispense: 90 tablet; Refill: 0    4. Ingrown right greater toenail  -     sulfamethoxazole-trimethoprim (Bactrim DS) 800-160 MG per tablet; Take 1 tablet by mouth 2 (Two) Times a Day.  Dispense: 14 tablet; Refill: 0             Follow Up   Return if symptoms worsen or fail to improve.  Patient was given instructions and counseling regarding his condition or for health maintenance advice. Please  see specific information pulled into the AVS if appropriate.     ELA Smiley  This note is electronically signed.

## 2023-11-06 DIAGNOSIS — J30.1 SEASONAL ALLERGIC RHINITIS DUE TO POLLEN: ICD-10-CM

## 2023-11-06 RX ORDER — CETIRIZINE HYDROCHLORIDE 10 MG/1
TABLET ORAL
Qty: 30 TABLET | Refills: 5 | Status: SHIPPED | OUTPATIENT
Start: 2023-11-06

## 2023-11-07 ENCOUNTER — OFFICE VISIT (OUTPATIENT)
Dept: FAMILY MEDICINE CLINIC | Facility: CLINIC | Age: 48
End: 2023-11-07
Payer: COMMERCIAL

## 2023-11-07 VITALS
TEMPERATURE: 97.7 F | SYSTOLIC BLOOD PRESSURE: 107 MMHG | BODY MASS INDEX: 34.04 KG/M2 | HEIGHT: 69 IN | HEART RATE: 71 BPM | WEIGHT: 229.8 LBS | OXYGEN SATURATION: 98 % | DIASTOLIC BLOOD PRESSURE: 73 MMHG

## 2023-11-07 DIAGNOSIS — M54.2 ACUTE NECK PAIN: Primary | ICD-10-CM

## 2023-11-07 DIAGNOSIS — M54.9 ACUTE MID BACK PAIN: ICD-10-CM

## 2023-11-07 RX ORDER — TRIAMCINOLONE ACETONIDE 40 MG/ML
80 INJECTION, SUSPENSION INTRA-ARTICULAR; INTRAMUSCULAR ONCE
Status: COMPLETED | OUTPATIENT
Start: 2023-11-07 | End: 2023-11-07

## 2023-11-07 RX ADMIN — TRIAMCINOLONE ACETONIDE 80 MG: 40 INJECTION, SUSPENSION INTRA-ARTICULAR; INTRAMUSCULAR at 14:06

## 2023-11-07 NOTE — PROGRESS NOTES
"Chief Complaint  Back Pain (Upper back, left side)    Subjective        Jagdish Kulkarni presents to [facility]  History of Present Illness  Patient has new onset of upper back and neck pain. He has known DDD of his lumbar spine but the neck and thoracic spine have not been a problem until now.  No imaging has been done. He is using his upper body with repetitive motion at his work and he feels that this has caused this new pain. He has an upcoming appt with new PM provider and he will discuss this new pain with them at that encounter.    Objective   Vital Signs:  /73 (BP Location: Right arm, Patient Position: Sitting, Cuff Size: Large Adult)   Pulse 71   Temp 97.7 °F (36.5 °C)   Ht 175.3 cm (69\")   Wt 104 kg (229 lb 12.8 oz)   SpO2 98%   BMI 33.94 kg/m²   Estimated body mass index is 33.94 kg/m² as calculated from the following:    Height as of this encounter: 175.3 cm (69\").    Weight as of this encounter: 104 kg (229 lb 12.8 oz).    BMI is >= 30 and <35. (Class 1 Obesity). The following options were offered after discussion;: exercise counseling/recommendations and nutrition counseling/recommendations      Physical Exam  Vitals and nursing note reviewed.   Constitutional:       General: He is not in acute distress.     Appearance: Normal appearance. He is obese. He is not ill-appearing.   HENT:      Head: Normocephalic and atraumatic.   Cardiovascular:      Rate and Rhythm: Normal rate and regular rhythm.      Heart sounds: Normal heart sounds.   Pulmonary:      Effort: Pulmonary effort is normal.      Breath sounds: Normal breath sounds.   Musculoskeletal:         General: Tenderness present.      Comments: Bilateral cervical muscle spasm with associated tenderness to percussion.   Skin:     General: Skin is warm and dry.   Neurological:      Mental Status: He is alert and oriented to person, place, and time.        Result Review :                Assessment and Plan "   Diagnoses and all orders for this visit:    1. Acute neck pain (Primary)  -     triamcinolone acetonide (KENALOG-40) injection 80 mg    2. Acute mid back pain  -     triamcinolone acetonide (KENALOG-40) injection 80 mg             Follow Up   Return if symptoms worsen or fail to improve.  Patient was given instructions and counseling regarding his condition or for health maintenance advice. Please see specific information pulled into the AVS if appropriate.     ELA Smiley  This note is electronically signed.

## 2023-11-08 ENCOUNTER — PATIENT ROUNDING (BHMG ONLY) (OUTPATIENT)
Dept: FAMILY MEDICINE CLINIC | Facility: CLINIC | Age: 48
End: 2023-11-08
Payer: COMMERCIAL

## 2023-11-08 NOTE — PROGRESS NOTES
November 8, 2023    Hello, may I speak with Jagdish Kulkarni?    My name is Qi    I am  with [facility]  [address] [id number] [phone].    Before we get started may I verify your date of birth? 1975    I am calling to officially welcome you to our practice and ask about your recent visit. Is this a good time to talk?     Tell me about your visit with us. What things went well?  PT DID NOT ANSWER PHONE CALL. LEFT VOICE MESSAGE ON WHY I CALLED.       We're always looking for ways to make our patients' experiences even better. Do you have recommendations on ways we may improve?      Overall were you satisfied with your first visit to our practice?        I appreciate you taking the time to speak with me today. Is there anything else I can do for you?       Thank you, and have a great day.

## 2023-11-13 ENCOUNTER — TELEPHONE (OUTPATIENT)
Dept: FAMILY MEDICINE CLINIC | Facility: CLINIC | Age: 48
End: 2023-11-13
Payer: COMMERCIAL

## 2023-11-13 DIAGNOSIS — G89.29 CHRONIC BILATERAL LOW BACK PAIN WITHOUT SCIATICA: ICD-10-CM

## 2023-11-13 DIAGNOSIS — M51.36 DDD (DEGENERATIVE DISC DISEASE), LUMBAR: ICD-10-CM

## 2023-11-13 DIAGNOSIS — M54.50 CHRONIC BILATERAL LOW BACK PAIN WITHOUT SCIATICA: ICD-10-CM

## 2023-11-13 RX ORDER — HYDROCODONE BITARTRATE AND ACETAMINOPHEN 10; 325 MG/1; MG/1
1 TABLET ORAL EVERY 6 HOURS PRN
Qty: 90 TABLET | Refills: 0 | Status: SHIPPED | OUTPATIENT
Start: 2023-11-13

## 2023-11-13 NOTE — TELEPHONE ENCOUNTER
Caller: Jagdish Kulkarni    Relationship: Self    Best call back number:  [phone]      Requested Prescriptions     Pending Prescriptions Disp Refills    HYDROcodone-acetaminophen (NORCO)  MG per tablet 90 tablet 0     Sig: Take 1 tablet by mouth Every 6 (Six) Hours As Needed for Moderate Pain or Severe Pain.        Pharmacy where request should be sent: [facility] [address] [phone] [facility] [phone] FX     Last office visit with prescribing clinician: 11/7/2023   Last telemedicine visit with prescribing clinician: Visit date not found   Next office visit with prescribing clinician: 3/1/2024     Additional details provided by patient:     TOTALLY OUT    Does the patient have less than a 3 day supply:  [x] Yes  [] No    Would you like a call back once the refill request has been completed: [] Yes [] No    If the office needs to give you a call back, can they leave a voicemail: [] Yes [] No    Andi Ye Rep   11/13/23 12:36 CST

## 2023-12-01 DIAGNOSIS — R73.03 PRE-DIABETES: ICD-10-CM

## 2023-12-01 DIAGNOSIS — K21.9 GASTROESOPHAGEAL REFLUX DISEASE, UNSPECIFIED WHETHER ESOPHAGITIS PRESENT: ICD-10-CM

## 2023-12-01 RX ORDER — MELOXICAM 15 MG/1
TABLET ORAL
Qty: 90 TABLET | Refills: 1 | Status: SHIPPED | OUTPATIENT
Start: 2023-12-01

## 2023-12-01 RX ORDER — OMEPRAZOLE 20 MG/1
CAPSULE, DELAYED RELEASE ORAL
Qty: 90 CAPSULE | Refills: 1 | Status: SHIPPED | OUTPATIENT
Start: 2023-12-01

## 2023-12-01 NOTE — TELEPHONE ENCOUNTER
Rx Refill Note  Requested Prescriptions     Pending Prescriptions Disp Refills    omeprazole (priLOSEC) 20 MG capsule [Pharmacy Med Name: OMEPRAZOLE 20MG CPDR] 90 capsule 1     Sig: TAKE ONE CAPSULE BY MOUTH EVERY DAY    metFORMIN (GLUCOPHAGE) 500 MG tablet [Pharmacy Med Name: METFORMIN HCL 500MG TABS] 90 tablet 1     Sig: TAKE ONE TABLET BY MOUTH EVERY DAY WITH BREAKFAST    meloxicam (MOBIC) 15 MG tablet [Pharmacy Med Name: MELOXICAM 15MG TABS] 30 tablet 1     Sig: TAKE ONE TABLET BY MOUTH EVERY DAY        Lay Chang MA  12/01/23, 12:42 CST

## 2023-12-08 ENCOUNTER — TRANSCRIBE ORDERS (OUTPATIENT)
Dept: ADMINISTRATIVE | Facility: HOSPITAL | Age: 48
End: 2023-12-08
Payer: COMMERCIAL

## 2023-12-08 DIAGNOSIS — M51.16 INTERVERTEBRAL DISC DISORDERS WITH RADICULOPATHY, LUMBAR REGION: ICD-10-CM

## 2023-12-08 DIAGNOSIS — M51.34 OTHER INTERVERTEBRAL DISC DEGENERATION, THORACIC REGION: ICD-10-CM

## 2023-12-08 DIAGNOSIS — M51.17 INTERVERTEBRAL DISC DISORDERS WITH RADICULOPATHY, LUMBOSACRAL REGION: ICD-10-CM

## 2023-12-08 DIAGNOSIS — M47.814 SPONDYLOSIS WITHOUT MYELOPATHY OR RADICULOPATHY, THORACIC REGION: ICD-10-CM

## 2023-12-08 DIAGNOSIS — M96.1 POSTLAMINECTOMY SYNDROME, NOT ELSEWHERE CLASSIFIED: ICD-10-CM

## 2023-12-08 DIAGNOSIS — M51.36 OTHER INTERVERTEBRAL DISC DEGENERATION, LUMBAR REGION: Primary | ICD-10-CM

## 2023-12-08 DIAGNOSIS — M51.37 OTHER INTERVERTEBRAL DISC DEGENERATION, LUMBOSACRAL REGION: ICD-10-CM

## 2023-12-21 ENCOUNTER — OFFICE VISIT (OUTPATIENT)
Dept: FAMILY MEDICINE CLINIC | Facility: CLINIC | Age: 48
End: 2023-12-21
Payer: COMMERCIAL

## 2023-12-21 VITALS
HEIGHT: 69 IN | HEART RATE: 59 BPM | OXYGEN SATURATION: 98 % | BODY MASS INDEX: 33.92 KG/M2 | TEMPERATURE: 97.8 F | RESPIRATION RATE: 20 BRPM | WEIGHT: 229 LBS | DIASTOLIC BLOOD PRESSURE: 68 MMHG | SYSTOLIC BLOOD PRESSURE: 110 MMHG

## 2023-12-21 DIAGNOSIS — R09.81 CONGESTION OF NASAL SINUS: ICD-10-CM

## 2023-12-21 DIAGNOSIS — R05.1 ACUTE COUGH: ICD-10-CM

## 2023-12-21 DIAGNOSIS — L72.3 SEBACEOUS CYST: ICD-10-CM

## 2023-12-21 DIAGNOSIS — J06.9 VIRAL URI WITH COUGH: Primary | ICD-10-CM

## 2023-12-21 LAB
EXPIRATION DATE: NORMAL
EXPIRATION DATE: NORMAL
FLUAV AG UPPER RESP QL IA.RAPID: NOT DETECTED
FLUBV AG UPPER RESP QL IA.RAPID: NOT DETECTED
INTERNAL CONTROL: NORMAL
INTERNAL CONTROL: NORMAL
Lab: NORMAL
Lab: NORMAL
S PYO AG THROAT QL: NEGATIVE
SARS-COV-2 AG UPPER RESP QL IA.RAPID: NOT DETECTED

## 2023-12-21 RX ORDER — BROMPHENIRAMINE MALEATE, PSEUDOEPHEDRINE HYDROCHLORIDE, AND DEXTROMETHORPHAN HYDROBROMIDE 2; 30; 10 MG/5ML; MG/5ML; MG/5ML
5 SYRUP ORAL 4 TIMES DAILY PRN
Qty: 473 ML | Refills: 0 | Status: SHIPPED | OUTPATIENT
Start: 2023-12-21

## 2023-12-21 RX ORDER — TRIAMCINOLONE ACETONIDE 40 MG/ML
40 INJECTION, SUSPENSION INTRA-ARTICULAR; INTRAMUSCULAR ONCE
Status: COMPLETED | OUTPATIENT
Start: 2023-12-21 | End: 2023-12-21

## 2023-12-21 RX ADMIN — TRIAMCINOLONE ACETONIDE 40 MG: 40 INJECTION, SUSPENSION INTRA-ARTICULAR; INTRAMUSCULAR at 08:29

## 2023-12-21 NOTE — PROGRESS NOTES
"       Chief Complaint  Cough and Nasal Congestion    Subjective        Hiland Lady presents to [facility]    HPI    Sick visit  -3-4 days ago symptoms started, having nasal congestion, cough, sore throat. No fever. Taking mucinex which is helping some.  Daughter is starting to have similar symptoms. No fever, chills, body aches, ear pain or difficulty breathing.    Past Medical History:   Diagnosis Date    Chronic bilateral low back pain without sciatica 6/14/2021    Diabetes mellitus     border line    Elevated cholesterol     GERD (gastroesophageal reflux disease)     Hyperlipidemia     Hypertension     PONV (postoperative nausea and vomiting)      Past Surgical History:   Procedure Laterality Date    CHOLECYSTECTOMY      HARDWARE REMOVAL N/A 6/16/2021    Procedure: REMOVAL OF INSTRUMENTATION;  Surgeon: EDIE Castro MD;  Location:  [facility];  Service: Orthopedic Spine;  Laterality: N/A;    LUMBAR FUSION Right 6/14/2021    Procedure: RIGHT LATERAL LUMBAR INTERBODY FUSION WITH INSTRUMENTATION L3-4;  Surgeon: EDIE Castro MD;  Location:  [facility] OR;  Service: Orthopedic Spine;  Laterality: Right;    LUMBAR LAMINECTOMY WITH FUSION Left 6/16/2021    Procedure: REMOVAL OF INSTRUMENTATION EXPLORATION OF FUSION LEFT L4-S1, POSTERIOR SPINAL FUSION L3-4 WITH INSTRUMENTATION L3-S1;  Surgeon: EDIE Castro MD;  Location:  [facility];  Service: Orthopedic Spine;  Laterality: Left;    LUMBAR SPINE SURGERY       Social History     Socioeconomic History    Marital status:    Tobacco Use    Smoking status: Never    Smokeless tobacco: Never   Vaping Use    Vaping Use: Never used   Substance and Sexual Activity    Alcohol use: Yes     Comment: seldom    Drug use: No    Sexual activity: Defer       Objective   Vital Signs:  /68   Pulse 59   Temp 97.8 °F (36.6 °C) (Temporal)   Resp 20   Ht 175.3 cm (69.02\")   Wt 104 kg (229 lb)   SpO2 98%   BMI 33.80 kg/m² " "  Estimated body mass index is 33.8 kg/m² as calculated from the following:    Height as of this encounter: 175.3 cm (69.02\").    Weight as of this encounter: 104 kg (229 lb).              Physical Exam  Vitals reviewed.   Constitutional:       Appearance: Normal appearance.   HENT:      Head: Normocephalic.      Ears:      Comments: TM's unremarkable.     Nose: Nose normal.      Mouth/Throat:      Mouth: Mucous membranes are moist.      Comments: Slight drainage in posterior op, no significant erythema  Eyes:      Extraocular Movements: Extraocular movements intact.   Cardiovascular:      Rate and Rhythm: Normal rate and regular rhythm.      Heart sounds: Normal heart sounds.   Pulmonary:      Effort: Pulmonary effort is normal.      Breath sounds: Normal breath sounds.      Comments: CTAB  Musculoskeletal:         General: Normal range of motion.      Cervical back: Normal range of motion.   Skin:     General: Skin is warm and dry.      Comments: Slightly raised, scabbed-over lesion w/ cyst-like nature located on upper central back overlying tattoo.   Neurological:      General: No focal deficit present.      Mental Status: He is alert and oriented to person, place, and time.   Psychiatric:         Mood and Affect: Mood normal.         Behavior: Behavior normal.        Result Review :                   Assessment and Plan   Diagnoses and all orders for this visit:    1. Viral URI with cough (Primary)  --Discussed likely viral etiology, supportive care recommendations. RTC parameters given  -     brompheniramine-pseudoephedrine-DM 30-2-10 MG/5ML syrup; Take 5 mL by mouth 4 (Four) Times a Day As Needed for Allergies.  Dispense: 473 mL; Refill: 0  -     triamcinolone acetonide (KENALOG-40) injection 40 mg    2. Acute cough  -     POCT SARS-CoV-2 Antigen VALENTÍN + Flu  -     POCT rapid strep A  -     triamcinolone acetonide (KENALOG-40) injection 40 mg    3. Congestion of nasal sinus  -     POCT SARS-CoV-2 Antigen VALENTÍN + " Flu  -     POCT rapid strep A  -     triamcinolone acetonide (KENALOG-40) injection 40 mg    4. Sebaceous cyst  -Pt notes chronic cyst-like lesion that expresses discharge intermittently. Appearance c/w sebaceous cyst.   -     Ambulatory Referral to Dermatology             EMR Dragon/Transcription disclaimer:   Much of this encounter note is an electronic transcription/translation of spoken language to printed text. The electronic translation of spoken language may permit erroneous, or at times, nonsensical words or phrases to be inadvertently transcribed; although attempts have made to review the note for such errors, some may still exist. Please excuse any unrecognized transcription errors and contact us if the air is unintelligible or needs documented correction. Also, portions of this note have been copied forward, however, changed to reflect the most current clinical status of this patient.  Follow Up   No follow-ups on file.  Patient was given instructions and counseling regarding his condition or for health maintenance advice. Please see specific information pulled into the AVS if appropriate.

## 2024-01-08 ENCOUNTER — HOSPITAL ENCOUNTER (OUTPATIENT)
Dept: MRI IMAGING | Facility: HOSPITAL | Age: 49
Discharge: HOME | End: 2024-01-08
Payer: COMMERCIAL

## 2024-01-08 ENCOUNTER — HOSPITAL ENCOUNTER (OUTPATIENT)
Dept: GENERAL RADIOLOGY | Facility: HOSPITAL | Age: 49
Discharge: HOME | End: 2024-01-08
Payer: COMMERCIAL

## 2024-01-08 DIAGNOSIS — M51.17 INTERVERTEBRAL DISC DISORDERS WITH RADICULOPATHY, LUMBOSACRAL REGION: ICD-10-CM

## 2024-01-08 DIAGNOSIS — M96.1 POSTLAMINECTOMY SYNDROME, NOT ELSEWHERE CLASSIFIED: ICD-10-CM

## 2024-01-08 DIAGNOSIS — M51.37 OTHER INTERVERTEBRAL DISC DEGENERATION, LUMBOSACRAL REGION: ICD-10-CM

## 2024-01-08 DIAGNOSIS — M51.16 INTERVERTEBRAL DISC DISORDERS WITH RADICULOPATHY, LUMBAR REGION: ICD-10-CM

## 2024-01-08 DIAGNOSIS — M47.814 SPONDYLOSIS WITHOUT MYELOPATHY OR RADICULOPATHY, THORACIC REGION: ICD-10-CM

## 2024-01-08 DIAGNOSIS — M51.34 OTHER INTERVERTEBRAL DISC DEGENERATION, THORACIC REGION: ICD-10-CM

## 2024-01-08 DIAGNOSIS — M51.36 OTHER INTERVERTEBRAL DISC DEGENERATION, LUMBAR REGION: ICD-10-CM

## 2024-01-08 LAB — CREAT BLDA-MCNC: 0.9 MG/DL (ref 0.6–1.3)

## 2024-01-08 PROCEDURE — 82565 ASSAY OF CREATININE: CPT

## 2024-01-08 PROCEDURE — 72158 MRI LUMBAR SPINE W/O & W/DYE: CPT

## 2024-01-08 PROCEDURE — 0 GADOBENATE DIMEGLUMINE 529 MG/ML SOLUTION: Performed by: PAIN MEDICINE

## 2024-01-08 PROCEDURE — A9577 INJ MULTIHANCE: HCPCS | Performed by: PAIN MEDICINE

## 2024-01-08 PROCEDURE — 72072 X-RAY EXAM THORAC SPINE 3VWS: CPT

## 2024-01-08 RX ADMIN — GADOBENATE DIMEGLUMINE 20 ML: 529 INJECTION, SOLUTION INTRAVENOUS at 15:57

## 2024-01-23 ENCOUNTER — OFFICE VISIT (OUTPATIENT)
Dept: FAMILY MEDICINE CLINIC | Facility: CLINIC | Age: 49
End: 2024-01-23
Payer: COMMERCIAL

## 2024-01-23 VITALS
HEART RATE: 76 BPM | HEIGHT: 69 IN | WEIGHT: 239 LBS | TEMPERATURE: 97.3 F | OXYGEN SATURATION: 99 % | DIASTOLIC BLOOD PRESSURE: 82 MMHG | BODY MASS INDEX: 35.4 KG/M2 | SYSTOLIC BLOOD PRESSURE: 116 MMHG

## 2024-01-23 DIAGNOSIS — J01.00 ACUTE NON-RECURRENT MAXILLARY SINUSITIS: Primary | ICD-10-CM

## 2024-01-23 DIAGNOSIS — R05.1 ACUTE COUGH: ICD-10-CM

## 2024-01-23 PROCEDURE — 3074F SYST BP LT 130 MM HG: CPT | Performed by: NURSE PRACTITIONER

## 2024-01-23 PROCEDURE — 1159F MED LIST DOCD IN RCRD: CPT | Performed by: NURSE PRACTITIONER

## 2024-01-23 PROCEDURE — 99213 OFFICE O/P EST LOW 20 MIN: CPT | Performed by: NURSE PRACTITIONER

## 2024-01-23 PROCEDURE — 96372 THER/PROPH/DIAG INJ SC/IM: CPT | Performed by: NURSE PRACTITIONER

## 2024-01-23 PROCEDURE — 1160F RVW MEDS BY RX/DR IN RCRD: CPT | Performed by: NURSE PRACTITIONER

## 2024-01-23 PROCEDURE — 3079F DIAST BP 80-89 MM HG: CPT | Performed by: NURSE PRACTITIONER

## 2024-01-23 RX ORDER — TRIAMCINOLONE ACETONIDE 40 MG/ML
80 INJECTION, SUSPENSION INTRA-ARTICULAR; INTRAMUSCULAR ONCE
Status: COMPLETED | OUTPATIENT
Start: 2024-01-23 | End: 2024-01-23

## 2024-01-23 RX ORDER — PROMETHAZINE HYDROCHLORIDE AND CODEINE PHOSPHATE 6.25; 1 MG/5ML; MG/5ML
5 SOLUTION ORAL EVERY 4 HOURS PRN
Qty: 180 ML | Refills: 0 | Status: SHIPPED | OUTPATIENT
Start: 2024-01-23

## 2024-01-23 RX ORDER — AZITHROMYCIN 250 MG/1
TABLET, FILM COATED ORAL
Qty: 6 TABLET | Refills: 0 | Status: SHIPPED | OUTPATIENT
Start: 2024-01-23

## 2024-01-23 RX ADMIN — TRIAMCINOLONE ACETONIDE 80 MG: 40 INJECTION, SUSPENSION INTRA-ARTICULAR; INTRAMUSCULAR at 07:32

## 2024-01-23 NOTE — PROGRESS NOTES
"Chief Complaint   Patient presents with    Sore Throat     Drainage         Subjective   Jagdish Kulkarni is a 48 y.o. male who presents today for sore throat and sinus drainage.     HPI   He has had a sore throat and sinus drainage x 1 week. He has tried OTC medications without relief. He has a slight cough. He denies any fever or body aches.     Allergies   Allergen Reactions    Other Other (See Comments)     Oral steroids:  Personality changes    Oxycontin [Oxycodone] Nausea And Vomiting and Other (See Comments)     Nightmares          OBJECTIVE:  Vitals:    01/23/24 0718   BP: 116/82   BP Location: Right arm   Patient Position: Sitting   Cuff Size: Large Adult   Pulse: 76   Temp: 97.3 °F (36.3 °C)   SpO2: 99%   Weight: 108 kg (239 lb)   Height: 175.3 cm (69\")     Physical Exam  HENT:      Right Ear: Tympanic membrane is bulging.      Left Ear: Tympanic membrane is bulging.      Nose: Congestion and rhinorrhea present.      Mouth/Throat:      Pharynx: Pharyngeal swelling and posterior oropharyngeal erythema present.   Pulmonary:      Effort: Pulmonary effort is normal.      Breath sounds: Normal breath sounds and air entry.                    ASSESSMENT/ PLAN:    Diagnoses and all orders for this visit:    1. Acute non-recurrent maxillary sinusitis (Primary)  -     triamcinolone acetonide (KENALOG-40) injection 80 mg  -     azithromycin (Zithromax Z-Sudhakar) 250 MG tablet; Take 2 tablets by mouth on day 1, then 1 tablet daily on days 2-5  Dispense: 6 tablet; Refill: 0    2. Acute cough  -     promethazine-codeine (PHENERGAN with CODEINE) 6.25-10 MG/5ML solution; Take 5 mL by mouth Every 4 (Four) Hours As Needed for Cough.  Dispense: 180 mL; Refill: 0      Procedures     Management Plan:   Complete all antibiotics and keep well hydrated.   An After Visit Summary was printed and given to the patient at discharge.    Follow-up: Return if symptoms worsen or fail to improve.         Viki Dubon, APRN 1/23/2024 07:52 " CST  This note was electronically signed.

## 2024-02-07 ENCOUNTER — OFFICE VISIT (OUTPATIENT)
Dept: FAMILY MEDICINE CLINIC | Facility: CLINIC | Age: 49
End: 2024-02-07
Payer: COMMERCIAL

## 2024-02-07 VITALS
BODY MASS INDEX: 35.52 KG/M2 | WEIGHT: 239.8 LBS | OXYGEN SATURATION: 98 % | HEART RATE: 72 BPM | TEMPERATURE: 97.7 F | DIASTOLIC BLOOD PRESSURE: 72 MMHG | HEIGHT: 69 IN | SYSTOLIC BLOOD PRESSURE: 109 MMHG

## 2024-02-07 DIAGNOSIS — R53.83 FATIGUE, UNSPECIFIED TYPE: ICD-10-CM

## 2024-02-07 DIAGNOSIS — L72.3 SEBACEOUS CYST: ICD-10-CM

## 2024-02-07 DIAGNOSIS — R73.03 PRE-DIABETES: ICD-10-CM

## 2024-02-07 DIAGNOSIS — R68.82 DECREASED SEX DRIVE: ICD-10-CM

## 2024-02-07 DIAGNOSIS — L02.411 ABSCESS OF AXILLA, RIGHT: Primary | ICD-10-CM

## 2024-02-07 PROCEDURE — 3074F SYST BP LT 130 MM HG: CPT | Performed by: NURSE PRACTITIONER

## 2024-02-07 PROCEDURE — 3078F DIAST BP <80 MM HG: CPT | Performed by: NURSE PRACTITIONER

## 2024-02-07 PROCEDURE — 1160F RVW MEDS BY RX/DR IN RCRD: CPT | Performed by: NURSE PRACTITIONER

## 2024-02-07 PROCEDURE — 99214 OFFICE O/P EST MOD 30 MIN: CPT | Performed by: NURSE PRACTITIONER

## 2024-02-07 PROCEDURE — 1159F MED LIST DOCD IN RCRD: CPT | Performed by: NURSE PRACTITIONER

## 2024-02-07 RX ORDER — SULFAMETHOXAZOLE AND TRIMETHOPRIM 800; 160 MG/1; MG/1
1 TABLET ORAL 2 TIMES DAILY
Qty: 14 TABLET | Refills: 0 | Status: SHIPPED | OUTPATIENT
Start: 2024-02-07

## 2024-02-07 NOTE — PROGRESS NOTES
"Chief Complaint  Cyst (Right arm pit, patient stats burning while applying deodorant )    Subjective        Hiltony Ladviri presents to [facility]  History of Present Illness  Patient presents with a \"few days\" history of pain in the right axilla, especially with washing and applying deodorant.  He has noticed swelling there but no drainage.    He complains of a cyst on his mid upper back that fills up and drains.  He is interested in having this removed.    Patient states his sex drive is terrible and he is more fatigued than he can remember.  He is interested in having his testosterone level checked.    Objective   Vital Signs:  /72 (BP Location: Right arm, Patient Position: Sitting, Cuff Size: Large Adult)   Pulse 72   Temp 97.7 °F (36.5 °C)   Ht 175.3 cm (69\")   Wt 109 kg (239 lb 12.8 oz)   SpO2 98%   BMI 35.41 kg/m²   Estimated body mass index is 35.41 kg/m² as calculated from the following:    Height as of this encounter: 175.3 cm (69\").    Weight as of this encounter: 109 kg (239 lb 12.8 oz).       Class 2 Severe Obesity (BMI >=35 and <=39.9). Obesity-related health conditions include the following: obstructive sleep apnea, hypertension, coronary heart disease, diabetes mellitus, dyslipidemias, and GERD. Obesity is unchanged. BMI is is above average; BMI management plan is completed. We discussed portion control and increasing exercise.        Physical Exam  Vitals and nursing note reviewed. Exam conducted with a chaperone present (Wife.).   Constitutional:       General: He is not in acute distress.     Appearance: Normal appearance. He is obese. He is not ill-appearing.   HENT:      Head: Normocephalic and atraumatic.   Cardiovascular:      Rate and Rhythm: Normal rate and regular rhythm.      Heart sounds: Normal heart sounds.   Pulmonary:      Effort: Pulmonary effort is normal.      Breath sounds: Normal breath sounds.   Musculoskeletal:      Right lower leg: No " edema.      Left lower leg: No edema.   Skin:     General: Skin is warm and dry.      Comments: The right axilla has an abscess formation at the top with a punctate central area. There is no associated lymphadenopathy. No significant erythema. No streaking.  On the upper mid back is an area consistent with a sebaceous cyst with overlying skin changes where patient has repeatedly squeezed the cyst.  There is no associated erythema or drainage at present.   Neurological:      Mental Status: He is alert and oriented to person, place, and time.        Result Review :                Assessment and Plan   Diagnoses and all orders for this visit:    1. Abscess of axilla, right (Primary)  -     sulfamethoxazole-trimethoprim (Bactrim DS) 800-160 MG per tablet; Take 1 tablet by mouth 2 (Two) Times a Day.  Dispense: 14 tablet; Refill: 0    2. Sebaceous cyst  -     Ambulatory Referral to General Surgery    3. Pre-diabetes  -     Comprehensive metabolic panel  -     Hemoglobin A1c    4. Decreased sex drive  -     Testosterone    5. Fatigue, unspecified type  -     Testosterone    Further recommendations will be based upon lab results.         Follow Up   Return if symptoms worsen or fail to improve (keep scheduled appt).  Patient was given instructions and counseling regarding his condition or for health maintenance advice. Please see specific information pulled into the AVS if appropriate.     ELA Smiley  This note is electronically signed.

## 2024-03-06 LAB
ALBUMIN SERPL-MCNC: 4.3 G/DL (ref 4.1–5.1)
ALBUMIN/GLOB SERPL: 1.7 {RATIO} (ref 1.2–2.2)
ALP SERPL-CCNC: 76 IU/L (ref 44–121)
ALT SERPL-CCNC: 16 IU/L (ref 0–44)
AST SERPL-CCNC: 16 IU/L (ref 0–40)
BILIRUB SERPL-MCNC: 0.4 MG/DL (ref 0–1.2)
BUN SERPL-MCNC: 23 MG/DL (ref 6–24)
BUN/CREAT SERPL: 25 (ref 9–20)
CALCIUM SERPL-MCNC: 10 MG/DL (ref 8.7–10.2)
CHLORIDE SERPL-SCNC: 103 MMOL/L (ref 96–106)
CO2 SERPL-SCNC: 23 MMOL/L (ref 20–29)
CREAT SERPL-MCNC: 0.93 MG/DL (ref 0.76–1.27)
EGFRCR SERPLBLD CKD-EPI 2021: 101 ML/MIN/1.73
GLOBULIN SER CALC-MCNC: 2.5 G/DL (ref 1.5–4.5)
GLUCOSE SERPL-MCNC: 89 MG/DL (ref 70–99)
HBA1C MFR BLD: 5.8 % (ref 4.8–5.6)
POTASSIUM SERPL-SCNC: 3.8 MMOL/L (ref 3.5–5.2)
PROT SERPL-MCNC: 6.8 G/DL (ref 6–8.5)
SODIUM SERPL-SCNC: 142 MMOL/L (ref 134–144)
TESTOST SERPL-MCNC: 275 NG/DL (ref 264–916)

## 2024-03-15 ENCOUNTER — OFFICE VISIT (OUTPATIENT)
Dept: FAMILY MEDICINE CLINIC | Facility: CLINIC | Age: 49
End: 2024-03-15
Payer: COMMERCIAL

## 2024-03-15 VITALS
HEART RATE: 82 BPM | DIASTOLIC BLOOD PRESSURE: 83 MMHG | OXYGEN SATURATION: 100 % | BODY MASS INDEX: 34.78 KG/M2 | SYSTOLIC BLOOD PRESSURE: 122 MMHG | WEIGHT: 234.8 LBS | TEMPERATURE: 97.1 F | HEIGHT: 69 IN

## 2024-03-15 DIAGNOSIS — R73.03 PRE-DIABETES: Primary | ICD-10-CM

## 2024-03-15 DIAGNOSIS — R79.89 LOW TESTOSTERONE LEVEL IN MALE: ICD-10-CM

## 2024-03-15 PROCEDURE — 3074F SYST BP LT 130 MM HG: CPT | Performed by: NURSE PRACTITIONER

## 2024-03-15 PROCEDURE — 3079F DIAST BP 80-89 MM HG: CPT | Performed by: NURSE PRACTITIONER

## 2024-03-15 PROCEDURE — 1159F MED LIST DOCD IN RCRD: CPT | Performed by: NURSE PRACTITIONER

## 2024-03-15 PROCEDURE — 99213 OFFICE O/P EST LOW 20 MIN: CPT | Performed by: NURSE PRACTITIONER

## 2024-03-15 PROCEDURE — 1160F RVW MEDS BY RX/DR IN RCRD: CPT | Performed by: NURSE PRACTITIONER

## 2024-03-15 RX ORDER — TESTOSTERONE 16.2 MG/G
GEL TRANSDERMAL
Qty: 75 G | Refills: 0 | Status: SHIPPED | OUTPATIENT
Start: 2024-03-15

## 2024-03-15 NOTE — PROGRESS NOTES
"Chief Complaint  Results (Lab results)    Subjective        Jagdish Kulkarni presents to [facility]  History of Present Illness  Patient had recent lab work to evaluate his treatment for pre-diabetes. He had also requested a testosterone check due to limiting fatigue. He presents today to discuss these findings. He has no new symptoms. He is not really limiting carbs in his diet. Activity is adequate. He is compliant with metformin.    Objective   Vital Signs:  /83 (BP Location: Left arm, Patient Position: Sitting, Cuff Size: Large Adult)   Pulse 82   Temp 97.1 °F (36.2 °C)   Ht 175.3 cm (69\")   Wt 107 kg (234 lb 12.8 oz)   SpO2 100%   BMI 34.67 kg/m²   Estimated body mass index is 34.67 kg/m² as calculated from the following:    Height as of this encounter: 175.3 cm (69\").    Weight as of this encounter: 107 kg (234 lb 12.8 oz).             Physical Exam  Vitals and nursing note reviewed.   Constitutional:       General: He is not in acute distress.     Appearance: Normal appearance. He is obese. He is not ill-appearing.   HENT:      Head: Normocephalic and atraumatic.   Cardiovascular:      Rate and Rhythm: Normal rate and regular rhythm.      Heart sounds: Normal heart sounds. No murmur heard.  Pulmonary:      Effort: Pulmonary effort is normal.      Breath sounds: Normal breath sounds.   Skin:     General: Skin is warm and dry.   Neurological:      Mental Status: He is alert and oriented to person, place, and time.        Result Review :  The following data was reviewed by: ELA Smiley on 03/15/2024:  CMP          8/15/2023    07:58 1/8/2024    15:01 3/5/2024    07:07   CMP   Glucose 106   89    BUN 15   23    Creatinine 0.80  0.90  0.93    Sodium 143   142    Potassium 3.7   3.8    Chloride 104   103    Calcium 9.6   10.0    Total Protein 6.5   6.8    Albumin 4.4   4.3    Globulin 2.1   2.5    Total Bilirubin 0.4   0.4    Alkaline Phosphatase 68   76    AST " (SGOT) 18   16    ALT (SGPT) 15   16    BUN/Creatinine Ratio 19   25      Most Recent A1C          3/5/2024    07:07   HGBA1C Most Recent   Hemoglobin A1C 5.8                Assessment and Plan   Diagnoses and all orders for this visit:    1. Pre-diabetes (Primary)        -      Continue metformin        -       Limit carbohydrates to 40 per meal    2. Low testosterone level in male  -     Testosterone (AndroGel Pump) 20.25 MG/ACT (1.62%) gel; 2 pumps bilateral axilla or inner groin daily  Dispense: 75 g; Refill: 0    If use of Androgel does not improve symptoms, patient will discontinue.   3 month follow up is only if he continues the Androgel. Then he will need UDS and controlled substance agreement. BEAN is reviewed.         Follow Up   Return in about 3 months (around 6/15/2024) for Next scheduled follow up.  Patient was given instructions and counseling regarding his condition or for health maintenance advice. Please see specific information pulled into the AVS if appropriate.     ELA Smiley  This note is electronically signed.

## 2024-03-22 ENCOUNTER — TELEPHONE (OUTPATIENT)
Dept: FAMILY MEDICINE CLINIC | Facility: CLINIC | Age: 49
End: 2024-03-22

## 2024-03-22 NOTE — TELEPHONE ENCOUNTER
Caller: Jagdish Kulkarni    Relationship: Self    Best call back number: [phone]     What medication are you requesting: SOMETHING FOR SYMPTOMS BELOW    What are your current symptoms: SORE THROAT AND COUGH AND RUNNY NOSE. ALSO SLIGHT FEVER    How long have you been experiencing symptoms: THIS MORNING    Have you had these symptoms before:    [] Yes  [x] No    Have you been treated for these symptoms before:   [] Yes  [x] No    If a prescription is needed, what is your preferred pharmacy and phone number:      [facility] [address] [phone] [facility] [phone]

## 2024-04-16 DIAGNOSIS — R11.14 BILIOUS VOMITING WITH NAUSEA: Primary | ICD-10-CM

## 2024-04-16 RX ORDER — ONDANSETRON 8 MG/1
8 TABLET, ORALLY DISINTEGRATING ORAL EVERY 8 HOURS PRN
Qty: 30 TABLET | Refills: 0 | Status: SHIPPED | OUTPATIENT
Start: 2024-04-16

## 2024-04-29 ENCOUNTER — TRANSCRIBE ORDERS (OUTPATIENT)
Dept: ADMINISTRATIVE | Facility: HOSPITAL | Age: 49
End: 2024-04-29
Payer: COMMERCIAL

## 2024-04-29 DIAGNOSIS — M54.50 DORSALGIA OF LUMBAR REGION: Primary | ICD-10-CM

## 2024-06-11 DIAGNOSIS — K21.9 GASTROESOPHAGEAL REFLUX DISEASE, UNSPECIFIED WHETHER ESOPHAGITIS PRESENT: ICD-10-CM

## 2024-06-11 DIAGNOSIS — R73.03 PRE-DIABETES: ICD-10-CM

## 2024-06-11 RX ORDER — FAMOTIDINE 40 MG/1
40 TABLET, FILM COATED ORAL DAILY
Qty: 90 TABLET | Refills: 1 | Status: SHIPPED | OUTPATIENT
Start: 2024-06-11

## 2024-06-11 RX ORDER — OMEPRAZOLE 20 MG/1
CAPSULE, DELAYED RELEASE ORAL
Qty: 90 CAPSULE | Refills: 1 | Status: SHIPPED | OUTPATIENT
Start: 2024-06-11

## 2024-06-11 NOTE — TELEPHONE ENCOUNTER
Rx Refill Note  Requested Prescriptions     Pending Prescriptions Disp Refills    metFORMIN (GLUCOPHAGE) 500 MG tablet [Pharmacy Med Name: METFORMIN HCL 500MG TABS] 90 tablet 1     Sig: TAKE ONE TABLET BY MOUTH EVERY DAY WITH BREAKFAST    omeprazole (priLOSEC) 20 MG capsule [Pharmacy Med Name: OMEPRAZOLE 20MG CPDR] 90 capsule 1     Sig: TAKE ONE CAPSULE BY MOUTH EVERY DAY    famotidine (PEPCID) 40 MG tablet [Pharmacy Med Name: FAMOTIDINE 40MG TABS] 90 tablet 1     Sig: TAKE ONE TABLET BY MOUTH EVERY DAY      Last office visit with prescribing clinician: 3/15/2024   Last telemedicine visit with prescribing clinician: Visit date not found   Next office visit with prescribing clinician: 6/14/2024   {    Jeri Pereyra MA  06/11/24, 09:34 CDT

## 2024-06-14 ENCOUNTER — OFFICE VISIT (OUTPATIENT)
Dept: FAMILY MEDICINE CLINIC | Facility: CLINIC | Age: 49
End: 2024-06-14
Payer: COMMERCIAL

## 2024-06-14 VITALS
WEIGHT: 233 LBS | HEART RATE: 56 BPM | RESPIRATION RATE: 18 BRPM | OXYGEN SATURATION: 98 % | TEMPERATURE: 98 F | DIASTOLIC BLOOD PRESSURE: 88 MMHG | SYSTOLIC BLOOD PRESSURE: 125 MMHG | BODY MASS INDEX: 34.51 KG/M2 | HEIGHT: 69 IN

## 2024-06-14 DIAGNOSIS — R73.03 PRE-DIABETES: ICD-10-CM

## 2024-06-14 DIAGNOSIS — M25.512 CHRONIC LEFT SHOULDER PAIN: ICD-10-CM

## 2024-06-14 DIAGNOSIS — R79.89 LOW TESTOSTERONE LEVEL IN MALE: ICD-10-CM

## 2024-06-14 DIAGNOSIS — Z79.899 LONG-TERM USE OF HIGH-RISK MEDICATION: Primary | ICD-10-CM

## 2024-06-14 DIAGNOSIS — G89.29 CHRONIC LEFT SHOULDER PAIN: ICD-10-CM

## 2024-06-14 LAB
EXPIRATION DATE: ABNORMAL
HBA1C MFR BLD: 5.7 % (ref 4.5–5.7)
Lab: ABNORMAL

## 2024-06-14 PROCEDURE — 3074F SYST BP LT 130 MM HG: CPT | Performed by: NURSE PRACTITIONER

## 2024-06-14 PROCEDURE — 1125F AMNT PAIN NOTED PAIN PRSNT: CPT | Performed by: NURSE PRACTITIONER

## 2024-06-14 PROCEDURE — 1160F RVW MEDS BY RX/DR IN RCRD: CPT | Performed by: NURSE PRACTITIONER

## 2024-06-14 PROCEDURE — 99214 OFFICE O/P EST MOD 30 MIN: CPT | Performed by: NURSE PRACTITIONER

## 2024-06-14 PROCEDURE — 83036 HEMOGLOBIN GLYCOSYLATED A1C: CPT | Performed by: NURSE PRACTITIONER

## 2024-06-14 PROCEDURE — 1159F MED LIST DOCD IN RCRD: CPT | Performed by: NURSE PRACTITIONER

## 2024-06-14 PROCEDURE — 3044F HG A1C LEVEL LT 7.0%: CPT | Performed by: NURSE PRACTITIONER

## 2024-06-14 PROCEDURE — 3079F DIAST BP 80-89 MM HG: CPT | Performed by: NURSE PRACTITIONER

## 2024-06-14 RX ORDER — TESTOSTERONE 16.2 MG/G
GEL TRANSDERMAL
Qty: 75 G | Refills: 0 | Status: SHIPPED | OUTPATIENT
Start: 2024-06-14

## 2024-06-14 NOTE — PROGRESS NOTES
"Chief Complaint  Diabetes (3 month/ compliance )    Subjective        Jagdish Kulkarni presents to [facility]  History of Present Illness  PREDIABETES: Patient continues to eat \"pretty much what I want to.\" He denies limiting carbohydrates, yet BG levels remain only slightly elevated. No reports of symptoms related to hypoglycemia or hyperglycemia.  MED REFILL: Patient uses topical testosterone for replacement. He reports still feeling tired. He is compliant with use. Will recheck a level in 3 months. UDS and controlled substance agreement are updated today. BEAN is reviewed.  LEFT SHOULDER PAIN: Patient has complaints of left shoulder pain with ROM. This is a chronic problem, \"just now mentioning it.\" Pain medication doesn't really help it.     Objective   Vital Signs:  /88 (BP Location: Left arm, Patient Position: Sitting, Cuff Size: Large Adult)   Pulse 56   Temp 98 °F (36.7 °C) (Temporal)   Resp 18   Ht 175.3 cm (69\")   Wt 106 kg (233 lb)   SpO2 98%   BMI 34.41 kg/m²   Estimated body mass index is 34.41 kg/m² as calculated from the following:    Height as of this encounter: 175.3 cm (69\").    Weight as of this encounter: 106 kg (233 lb).             Physical Exam  Vitals and nursing note reviewed.   Constitutional:       General: He is not in acute distress.     Appearance: Normal appearance. He is obese. He is not ill-appearing.   HENT:      Head: Normocephalic and atraumatic.   Cardiovascular:      Rate and Rhythm: Normal rate and regular rhythm.      Heart sounds: Normal heart sounds. No murmur heard.  Pulmonary:      Effort: Pulmonary effort is normal.      Breath sounds: Normal breath sounds.   Musculoskeletal:         General: Tenderness present.      Right lower leg: No edema.      Left lower leg: No edema.      Comments: No crepitus to left shoulder. Pain with palpation is noted. Pain with abduction.   Skin:     General: Skin is warm and dry.   Neurological:    "   Mental Status: He is alert and oriented to person, place, and time.        Result Review :                Assessment and Plan   Diagnoses and all orders for this visit:    1. Long-term use of high-risk medication (Primary)  -     Compliance Drug Analysis, Ur - Urine, Clean Catch    2. Low testosterone level in male  -     Testosterone (AndroGel Pump) 20.25 MG/ACT (1.62%) gel; 2 pumps bilateral axilla or inner groin daily  Dispense: 75 g; Refill: 0    3. Pre-diabetes  -     POCT glycated hemoglobin, total    4. Chronic left shoulder pain  -     XR Shoulder 2+ View Left; Future             Follow Up   Return in about 3 months (around 9/14/2024) for annual physical with labs prior.  Patient was given instructions and counseling regarding his condition or for health maintenance advice. Please see specific information pulled into the AVS if appropriate.     ELA Smiley  This note is electronically signed.

## 2024-06-26 LAB — DRUGS UR: NORMAL

## 2024-07-18 DIAGNOSIS — J30.1 SEASONAL ALLERGIC RHINITIS DUE TO POLLEN: ICD-10-CM

## 2024-07-18 RX ORDER — CETIRIZINE HYDROCHLORIDE 10 MG/1
TABLET ORAL
Qty: 30 TABLET | Refills: 5 | Status: SHIPPED | OUTPATIENT
Start: 2024-07-18

## 2024-07-18 NOTE — TELEPHONE ENCOUNTER
Rx Refill Note  Requested Prescriptions     Pending Prescriptions Disp Refills    cetirizine (zyrTEC) 10 MG tablet [Pharmacy Med Name: CETIRIZINE HCL 10MG TABS] 30 tablet 5     Sig: TAKE ONE TABLET BY MOUTH EVERY DAY AS NEEDED FOR ALLERGIES OR RHINITIS      Last office visit with prescribing clinician: 6/14/2024   Last telemedicine visit with prescribing clinician: Visit date not found   Next office visit with prescribing clinician: 9/16/2024       Jeri Pereyra MA  07/18/24, 09:04 CDT

## 2024-08-13 ENCOUNTER — OFFICE VISIT (OUTPATIENT)
Dept: FAMILY MEDICINE CLINIC | Facility: CLINIC | Age: 49
End: 2024-08-13
Payer: COMMERCIAL

## 2024-08-13 VITALS
OXYGEN SATURATION: 99 % | DIASTOLIC BLOOD PRESSURE: 91 MMHG | HEIGHT: 69 IN | HEART RATE: 72 BPM | WEIGHT: 232 LBS | SYSTOLIC BLOOD PRESSURE: 148 MMHG | BODY MASS INDEX: 34.36 KG/M2 | TEMPERATURE: 98.2 F

## 2024-08-13 DIAGNOSIS — R03.0 ELEVATED BLOOD PRESSURE READING: ICD-10-CM

## 2024-08-13 DIAGNOSIS — R06.02 SHORTNESS OF BREATH AT REST: ICD-10-CM

## 2024-08-13 DIAGNOSIS — I10 ESSENTIAL HYPERTENSION: Primary | ICD-10-CM

## 2024-08-13 PROCEDURE — 3080F DIAST BP >= 90 MM HG: CPT | Performed by: NURSE PRACTITIONER

## 2024-08-13 PROCEDURE — 1160F RVW MEDS BY RX/DR IN RCRD: CPT | Performed by: NURSE PRACTITIONER

## 2024-08-13 PROCEDURE — 1159F MED LIST DOCD IN RCRD: CPT | Performed by: NURSE PRACTITIONER

## 2024-08-13 PROCEDURE — 99213 OFFICE O/P EST LOW 20 MIN: CPT | Performed by: NURSE PRACTITIONER

## 2024-08-13 PROCEDURE — 1125F AMNT PAIN NOTED PAIN PRSNT: CPT | Performed by: NURSE PRACTITIONER

## 2024-08-13 PROCEDURE — 3077F SYST BP >= 140 MM HG: CPT | Performed by: NURSE PRACTITIONER

## 2024-08-13 RX ORDER — LISINOPRIL 5 MG/1
10 TABLET ORAL DAILY
Start: 2024-08-13

## 2024-08-13 NOTE — PROGRESS NOTES
"Chief Complaint   Patient presents with    Hypertension     Headaches, face feels hot         Subjective   Jagdish Kulkarni is a 49 y.o. male who presents today for the following: HTN    Hypertension  This is a chronic problem. The current episode started 1 to 4 weeks ago. The problem is unchanged. The problem is uncontrolled. Associated symptoms include shortness of breath. There are no associated agents to hypertension. Risk factors for coronary artery disease include sedentary lifestyle, male gender and dyslipidemia. Past treatments include ACE inhibitors. Current antihypertension treatment includes ACE inhibitors. The current treatment provides mild improvement. Compliance problems include exercise and diet.  Identifiable causes of hypertension include sleep apnea.      He feels like his blood pressure is higher than it should be. He has been having headaches and feeling hot. He went to the doctor last week and his diastolic was 100.   He also states he is SOA at night. He does admit to snoring and gasping for air. We discussed getting a sleep study and the importance of correcting that for his overall health.     Allergies   Allergen Reactions    Other Other (See Comments)     Oral steroids:  Personality changes    Oxycontin [Oxycodone] Nausea And Vomiting and Other (See Comments)     Nightmares          OBJECTIVE:  Vitals:    08/13/24 0821   BP: 148/91   BP Location: Left arm   Patient Position: Sitting   Cuff Size: Large Adult   Pulse: 72   Temp: 98.2 °F (36.8 °C)   SpO2: 99%   Weight: 105 kg (232 lb)   Height: 175.3 cm (69\")     Physical Exam  Vitals and nursing note reviewed.   Constitutional:       Appearance: Normal appearance. He is obese.   Cardiovascular:      Rate and Rhythm: Normal rate and regular rhythm.      Pulses: Normal pulses.      Heart sounds: Normal heart sounds.   Pulmonary:      Effort: Pulmonary effort is normal.      Breath sounds: Normal breath sounds.   Skin:     General: Skin is warm and " dry.   Neurological:      General: No focal deficit present.      Mental Status: He is alert and oriented to person, place, and time.   Psychiatric:         Mood and Affect: Mood normal.         Behavior: Behavior normal.         Thought Content: Thought content normal.         Judgment: Judgment normal.                    ASSESSMENT/ PLAN:    Diagnoses and all orders for this visit:    1. Essential hypertension (Primary)  -     lisinopril (PRINIVIL,ZESTRIL) 5 MG tablet; Take 2 tablets by mouth Daily.    2. Elevated blood pressure reading  -     lisinopril (PRINIVIL,ZESTRIL) 5 MG tablet; Take 2 tablets by mouth Daily.    3. Shortness of breath at rest      Procedures     Management Plan:   Patient does admit he needs a sleep study but he is not ready at this time to get one done. He states he is very busy. He states he will get back to me on this.   An After Visit Summary was printed and given to the patient at discharge.    Follow-up: Return in about 1 week (around 8/20/2024) for BLOOD PRESSURE FOLLOW UP.         ELA Sheikh 8/13/2024 08:43 CDT  This note was electronically signed.

## 2024-08-20 ENCOUNTER — OFFICE VISIT (OUTPATIENT)
Dept: FAMILY MEDICINE CLINIC | Facility: CLINIC | Age: 49
End: 2024-08-20
Payer: COMMERCIAL

## 2024-08-20 VITALS
TEMPERATURE: 97.8 F | HEART RATE: 66 BPM | HEIGHT: 69 IN | SYSTOLIC BLOOD PRESSURE: 128 MMHG | DIASTOLIC BLOOD PRESSURE: 88 MMHG | OXYGEN SATURATION: 99 % | WEIGHT: 232 LBS | BODY MASS INDEX: 34.36 KG/M2

## 2024-08-20 DIAGNOSIS — I10 ESSENTIAL HYPERTENSION: Primary | ICD-10-CM

## 2024-08-20 DIAGNOSIS — R53.83 OTHER FATIGUE: ICD-10-CM

## 2024-08-20 PROCEDURE — 1160F RVW MEDS BY RX/DR IN RCRD: CPT | Performed by: NURSE PRACTITIONER

## 2024-08-20 PROCEDURE — 3074F SYST BP LT 130 MM HG: CPT | Performed by: NURSE PRACTITIONER

## 2024-08-20 PROCEDURE — 99214 OFFICE O/P EST MOD 30 MIN: CPT | Performed by: NURSE PRACTITIONER

## 2024-08-20 PROCEDURE — 1159F MED LIST DOCD IN RCRD: CPT | Performed by: NURSE PRACTITIONER

## 2024-08-20 PROCEDURE — 3079F DIAST BP 80-89 MM HG: CPT | Performed by: NURSE PRACTITIONER

## 2024-08-20 PROCEDURE — 1125F AMNT PAIN NOTED PAIN PRSNT: CPT | Performed by: NURSE PRACTITIONER

## 2024-08-20 NOTE — PATIENT INSTRUCTIONS
Hypertension, Adult  Hypertension is another name for high blood pressure. High blood pressure forces your heart to work harder to pump blood. This can cause problems over time.  There are two numbers in a blood pressure reading. There is a top number (systolic) over a bottom number (diastolic). It is best to have a blood pressure that is below 120/80.  What are the causes?  The cause of this condition is not known. Some other conditions can lead to high blood pressure.  What increases the risk?  Some lifestyle factors can make you more likely to develop high blood pressure:  Smoking.  Not getting enough exercise or physical activity.  Being overweight.  Having too much fat, sugar, calories, or salt (sodium) in your diet.  Drinking too much alcohol.  Other risk factors include:  Having any of these conditions:  Heart disease.  Diabetes.  High cholesterol.  Kidney disease.  Obstructive sleep apnea.  Having a family history of high blood pressure and high cholesterol.  Age. The risk increases with age.  Stress.  What are the signs or symptoms?  High blood pressure may not cause symptoms. Very high blood pressure (hypertensive crisis) may cause:  Headache.  Fast or uneven heartbeats (palpitations).  Shortness of breath.  Nosebleed.  Vomiting or feeling like you may vomit (nauseous).  Changes in how you see.  Very bad chest pain.  Feeling dizzy.  Seizures.  How is this treated?  This condition is treated by making healthy lifestyle changes, such as:  Eating healthy foods.  Exercising more.  Drinking less alcohol.  Your doctor may prescribe medicine if lifestyle changes do not help enough and if:  Your top number is above 130.  Your bottom number is above 80.  Your personal target blood pressure may vary.  Follow these instructions at home:  Eating and drinking    If told, follow the DASH eating plan. To follow this plan:  Fill one half of your plate at each meal with fruits and vegetables.  Fill one fourth of your plate  at each meal with whole grains. Whole grains include whole-wheat pasta, brown rice, and whole-grain bread.  Eat or drink low-fat dairy products, such as skim milk or low-fat yogurt.  Fill one fourth of your plate at each meal with low-fat (lean) proteins. Low-fat proteins include fish, chicken without skin, eggs, beans, and tofu.  Avoid fatty meat, cured and processed meat, or chicken with skin.  Avoid pre-made or processed food.  Limit the amount of salt in your diet to less than 1,500 mg each day.  Do not drink alcohol if:  Your doctor tells you not to drink.  You are pregnant, may be pregnant, or are planning to become pregnant.  If you drink alcohol:  Limit how much you have to:  0-1 drink a day for women.  0-2 drinks a day for men.  Know how much alcohol is in your drink. In the [place] one drink equals one 12 oz bottle of beer (355 mL), one 5 oz glass of wine (148 mL), or one 1½ oz glass of hard liquor (44 mL).  Lifestyle    Work with your doctor to stay at a healthy weight or to lose weight. Ask your doctor what the best weight is for you.  Get at least 30 minutes of exercise that causes your heart to beat faster (aerobic exercise) most days of the week. This may include walking, swimming, or biking.  Get at least 30 minutes of exercise that strengthens your muscles (resistance exercise) at least 3 days a week. This may include lifting weights or doing Pilates.  Do not smoke or use any products that contain nicotine or tobacco. If you need help quitting, ask your doctor.  Check your blood pressure at home as told by your doctor.  Keep all follow-up visits.  Medicines  Take over-the-counter and prescription medicines only as told by your doctor. Follow directions carefully.  Do not skip doses of blood pressure medicine. The medicine does not work as well if you skip doses. Skipping doses also puts you at risk for problems.  Ask your doctor about side effects or reactions to medicines that you should watch  for.  Contact a doctor if:  You think you are having a reaction to the medicine you are taking.  You have headaches that keep coming back.  You feel dizzy.  You have swelling in your ankles.  You have trouble with your vision.  Get help right away if:  You get a very bad headache.  You start to feel mixed up (confused).  You feel weak or numb.  You feel faint.  You have very bad pain in your:  Chest.  Belly (abdomen).  You vomit more than once.  You have trouble breathing.  These symptoms may be an emergency. Get help right away. Call 911.  Do not wait to see if the symptoms will go away.  Do not drive yourself to the hospital.  Summary  Hypertension is another name for high blood pressure.  High blood pressure forces your heart to work harder to pump blood.  For most people, a normal blood pressure is less than 120/80.  Making healthy choices can help lower blood pressure. If your blood pressure does not get lower with healthy choices, you may need to take medicine.  This information is not intended to replace advice given to you by your health care provider. Make sure you discuss any questions you have with your health care provider.  Document Revised: 10/06/2022 Document Reviewed: 10/06/2022  Elsevier Patient Education © 2024 [employer]

## 2024-08-20 NOTE — PROGRESS NOTES
"Chief Complaint   Patient presents with    Hypertension        Subjective   Jagdish Kulkarni is a 49 y.o. male who presents today for the following     Hypertension  This is a chronic problem. (Patient states that he is still having fatigue even though his blood pressure is running normal. ) Current antihypertension treatment includes ACE inhibitors. The current treatment provides significant improvement. Compliance problems include diet and exercise.    Fatigue  This is a recurrent problem. The problem has been unchanged. Treatments tried: testosterone. The treatment provided no relief.          Allergies   Allergen Reactions    Other Other (See Comments)     Oral steroids:  Personality changes    Oxycontin [Oxycodone] Nausea And Vomiting and Other (See Comments)     Nightmares          OBJECTIVE:  Vitals:    08/20/24 0725   BP: 128/88   BP Location: Right arm   Patient Position: Sitting   Cuff Size: Large Adult   Pulse: 66   Temp: 97.8 °F (36.6 °C)   SpO2: 99%   Weight: 105 kg (232 lb)   Height: 175.3 cm (69\")     Physical Exam  Constitutional:       Appearance: Normal appearance.   HENT:      Nose: Nose normal.   Cardiovascular:      Rate and Rhythm: Normal rate and regular rhythm.   Pulmonary:      Breath sounds: Normal breath sounds. No wheezing or rhonchi.   Abdominal:      General: Bowel sounds are normal.   Musculoskeletal:         General: Normal range of motion.   Skin:     General: Skin is warm and dry.   Neurological:      Mental Status: He is alert and oriented to person, place, and time.   Psychiatric:         Mood and Affect: Mood normal.         Behavior: Behavior normal.                    ASSESSMENT/ PLAN:    Diagnoses and all orders for this visit:    1. Essential hypertension (Primary)  -     CBC & Differential  -     Comprehensive Metabolic Panel  -     TSH  -     Lipid Panel With LDL / HDL Ratio    2. Other fatigue  -     CBC & Differential  -     Comprehensive Metabolic Panel  -     TSH  -     " Lipid Panel With LDL / HDL Ratio      Procedures   Patient and I discussed sleep study which was suggested in his previous visit. Patient declined at this time.     Follow-up: Return in about 1 week (around 8/27/2024) for follow up on high blood pressure, fatigue, and labs .      Sveta Myles, ELA 8/20/2024 07:48 CDT  This note was electronically signed.

## 2024-08-22 LAB
ALBUMIN SERPL-MCNC: 4.1 G/DL (ref 4.1–5.1)
ALP SERPL-CCNC: 68 IU/L (ref 44–121)
ALT SERPL-CCNC: 17 IU/L (ref 0–44)
AST SERPL-CCNC: 17 IU/L (ref 0–40)
BASOPHILS # BLD AUTO: 0.1 X10E3/UL (ref 0–0.2)
BASOPHILS NFR BLD AUTO: 1 %
BILIRUB SERPL-MCNC: 0.4 MG/DL (ref 0–1.2)
BUN SERPL-MCNC: 15 MG/DL (ref 6–24)
BUN/CREAT SERPL: 16 (ref 9–20)
CALCIUM SERPL-MCNC: 9.4 MG/DL (ref 8.7–10.2)
CHLORIDE SERPL-SCNC: 104 MMOL/L (ref 96–106)
CHOLEST SERPL-MCNC: 172 MG/DL (ref 100–199)
CO2 SERPL-SCNC: 24 MMOL/L (ref 20–29)
CREAT SERPL-MCNC: 0.94 MG/DL (ref 0.76–1.27)
EGFRCR SERPLBLD CKD-EPI 2021: 99 ML/MIN/1.73
EOSINOPHIL # BLD AUTO: 0.1 X10E3/UL (ref 0–0.4)
EOSINOPHIL NFR BLD AUTO: 1 %
ERYTHROCYTE [DISTWIDTH] IN BLOOD BY AUTOMATED COUNT: 13.4 % (ref 11.6–15.4)
GLOBULIN SER CALC-MCNC: 2.4 G/DL (ref 1.5–4.5)
GLUCOSE SERPL-MCNC: 93 MG/DL (ref 70–99)
HCT VFR BLD AUTO: 43.1 % (ref 37.5–51)
HDLC SERPL-MCNC: 39 MG/DL
HGB BLD-MCNC: 14 G/DL (ref 13–17.7)
IMM GRANULOCYTES # BLD AUTO: 0 X10E3/UL (ref 0–0.1)
IMM GRANULOCYTES NFR BLD AUTO: 0 %
LDLC SERPL CALC-MCNC: 95 MG/DL (ref 0–99)
LDLC/HDLC SERPL: 2.4 RATIO (ref 0–3.6)
LYMPHOCYTES # BLD AUTO: 2.5 X10E3/UL (ref 0.7–3.1)
LYMPHOCYTES NFR BLD AUTO: 35 %
MCH RBC QN AUTO: 29.9 PG (ref 26.6–33)
MCHC RBC AUTO-ENTMCNC: 32.5 G/DL (ref 31.5–35.7)
MCV RBC AUTO: 92 FL (ref 79–97)
MONOCYTES # BLD AUTO: 0.6 X10E3/UL (ref 0.1–0.9)
MONOCYTES NFR BLD AUTO: 8 %
NEUTROPHILS # BLD AUTO: 3.9 X10E3/UL (ref 1.4–7)
NEUTROPHILS NFR BLD AUTO: 55 %
PLATELET # BLD AUTO: 245 X10E3/UL (ref 150–450)
POTASSIUM SERPL-SCNC: 4 MMOL/L (ref 3.5–5.2)
PROT SERPL-MCNC: 6.5 G/DL (ref 6–8.5)
RBC # BLD AUTO: 4.68 X10E6/UL (ref 4.14–5.8)
SODIUM SERPL-SCNC: 142 MMOL/L (ref 134–144)
TRIGL SERPL-MCNC: 225 MG/DL (ref 0–149)
TSH SERPL DL<=0.005 MIU/L-ACNC: 4.22 UIU/ML (ref 0.45–4.5)
VLDLC SERPL CALC-MCNC: 38 MG/DL (ref 5–40)
WBC # BLD AUTO: 7.1 X10E3/UL (ref 3.4–10.8)

## 2024-09-03 ENCOUNTER — OFFICE VISIT (OUTPATIENT)
Dept: FAMILY MEDICINE CLINIC | Facility: CLINIC | Age: 49
End: 2024-09-03
Payer: COMMERCIAL

## 2024-09-03 VITALS
HEIGHT: 69 IN | TEMPERATURE: 98 F | OXYGEN SATURATION: 98 % | SYSTOLIC BLOOD PRESSURE: 116 MMHG | HEART RATE: 96 BPM | BODY MASS INDEX: 34.33 KG/M2 | DIASTOLIC BLOOD PRESSURE: 89 MMHG | WEIGHT: 231.8 LBS

## 2024-09-03 DIAGNOSIS — I10 ESSENTIAL HYPERTENSION: Primary | Chronic | ICD-10-CM

## 2024-09-03 DIAGNOSIS — R53.83 OTHER FATIGUE: ICD-10-CM

## 2024-09-03 PROCEDURE — 3074F SYST BP LT 130 MM HG: CPT | Performed by: NURSE PRACTITIONER

## 2024-09-03 PROCEDURE — 1125F AMNT PAIN NOTED PAIN PRSNT: CPT | Performed by: NURSE PRACTITIONER

## 2024-09-03 PROCEDURE — 1160F RVW MEDS BY RX/DR IN RCRD: CPT | Performed by: NURSE PRACTITIONER

## 2024-09-03 PROCEDURE — 99213 OFFICE O/P EST LOW 20 MIN: CPT | Performed by: NURSE PRACTITIONER

## 2024-09-03 PROCEDURE — 3079F DIAST BP 80-89 MM HG: CPT | Performed by: NURSE PRACTITIONER

## 2024-09-03 PROCEDURE — 1159F MED LIST DOCD IN RCRD: CPT | Performed by: NURSE PRACTITIONER

## 2024-09-03 RX ORDER — LISINOPRIL 10 MG/1
10 TABLET ORAL DAILY
Qty: 30 TABLET | Refills: 2 | Status: SHIPPED | OUTPATIENT
Start: 2024-09-03 | End: 2024-12-02

## 2024-09-03 NOTE — PROGRESS NOTES
"Chief Complaint   Patient presents with    Essential hypertension     Pt is here for a 1 week follow up. Pt states he needs a refill on bp medicine.     Med Refill     Lisinopril and zofran. Pt wants to know if he needs to continue taking 10mg of lisinopril.           Subjective   Jagdish Kulkarni is a 49 y.o. male who presents today for the following HTN.  Patient states he has been taking 2 lisinopril 5mg. Blood pressure is controlled at this time. Patient denies any side effects from medication. Patient states he is still tired.       Allergies   Allergen Reactions    Other Other (See Comments)     Oral steroids:  Personality changes    Oxycontin [Oxycodone] Nausea And Vomiting and Other (See Comments)     Nightmares          OBJECTIVE:  Vitals:    09/03/24 1043   BP: 116/89   BP Location: Left arm   Patient Position: Sitting   Pulse: 96   Temp: 98 °F (36.7 °C)   TempSrc: Infrared   SpO2: 98%   Weight: 105 kg (231 lb 12.8 oz)   Height: 175.3 cm (69.02\")     Physical Exam  Vitals and nursing note reviewed.   Constitutional:       Appearance: Normal appearance.   HENT:      Head: Normocephalic and atraumatic.      Nose: Nose normal.   Eyes:      Pupils: Pupils are equal, round, and reactive to light.   Cardiovascular:      Rate and Rhythm: Normal rate and regular rhythm.   Pulmonary:      Breath sounds: Normal breath sounds. No wheezing or rhonchi.   Abdominal:      General: Bowel sounds are normal.   Musculoskeletal:         General: Normal range of motion.   Skin:     General: Skin is warm and dry.   Neurological:      Mental Status: He is alert and oriented to person, place, and time.   Psychiatric:         Mood and Affect: Mood normal.         Behavior: Behavior normal.         Thought Content: Thought content normal.         Judgment: Judgment normal.                    ASSESSMENT/ PLAN:    Diagnoses and all orders for this visit:    1. Essential hypertension (Primary)  -     lisinopril (PRINIVIL,ZESTRIL) 10 MG " tablet; Take 1 tablet by mouth Daily for 90 days.  Dispense: 30 tablet; Refill: 2    2. Other fatigue  Comments:  Encouraged patient to have a sleep study performed. Declined at this time.      Procedures       Follow-up: Return in about 13 days (around 9/16/2024) for Annual physical.      Sveta Myles, ELA 9/3/2024 11:09 CDT  This note was electronically signed.

## 2024-09-16 ENCOUNTER — OFFICE VISIT (OUTPATIENT)
Dept: FAMILY MEDICINE CLINIC | Facility: CLINIC | Age: 49
End: 2024-09-16
Payer: COMMERCIAL

## 2024-09-16 VITALS
BODY MASS INDEX: 34.21 KG/M2 | TEMPERATURE: 97.5 F | WEIGHT: 231 LBS | DIASTOLIC BLOOD PRESSURE: 84 MMHG | OXYGEN SATURATION: 100 % | HEART RATE: 67 BPM | HEIGHT: 69 IN | SYSTOLIC BLOOD PRESSURE: 118 MMHG

## 2024-09-16 DIAGNOSIS — Z00.00 ANNUAL PHYSICAL EXAM: Primary | ICD-10-CM

## 2024-09-16 DIAGNOSIS — R73.03 PRE-DIABETES: ICD-10-CM

## 2024-09-16 DIAGNOSIS — E78.2 MIXED HYPERLIPIDEMIA: ICD-10-CM

## 2024-09-16 DIAGNOSIS — E66.09 CLASS 1 OBESITY DUE TO EXCESS CALORIES WITH SERIOUS COMORBIDITY AND BODY MASS INDEX (BMI) OF 34.0 TO 34.9 IN ADULT: ICD-10-CM

## 2024-09-16 DIAGNOSIS — I10 ESSENTIAL HYPERTENSION: ICD-10-CM

## 2024-09-16 PROCEDURE — 3079F DIAST BP 80-89 MM HG: CPT | Performed by: NURSE PRACTITIONER

## 2024-09-16 PROCEDURE — 1160F RVW MEDS BY RX/DR IN RCRD: CPT | Performed by: NURSE PRACTITIONER

## 2024-09-16 PROCEDURE — 1159F MED LIST DOCD IN RCRD: CPT | Performed by: NURSE PRACTITIONER

## 2024-09-16 PROCEDURE — 3074F SYST BP LT 130 MM HG: CPT | Performed by: NURSE PRACTITIONER

## 2024-09-16 PROCEDURE — 99396 PREV VISIT EST AGE 40-64: CPT | Performed by: NURSE PRACTITIONER

## 2024-09-16 PROCEDURE — 1125F AMNT PAIN NOTED PAIN PRSNT: CPT | Performed by: NURSE PRACTITIONER

## 2024-09-16 RX ORDER — ROSUVASTATIN CALCIUM 10 MG/1
10 TABLET, COATED ORAL DAILY
Qty: 90 TABLET | Refills: 3 | Status: SHIPPED | OUTPATIENT
Start: 2024-09-16

## 2024-11-14 ENCOUNTER — OFFICE VISIT (OUTPATIENT)
Dept: FAMILY MEDICINE CLINIC | Facility: CLINIC | Age: 49
End: 2024-11-14
Payer: COMMERCIAL

## 2024-11-14 VITALS
HEART RATE: 73 BPM | OXYGEN SATURATION: 97 % | WEIGHT: 246.2 LBS | TEMPERATURE: 98.1 F | RESPIRATION RATE: 20 BRPM | DIASTOLIC BLOOD PRESSURE: 88 MMHG | SYSTOLIC BLOOD PRESSURE: 127 MMHG | HEIGHT: 69 IN | BODY MASS INDEX: 36.46 KG/M2

## 2024-11-14 DIAGNOSIS — J40 BRONCHITIS: Primary | ICD-10-CM

## 2024-11-14 PROCEDURE — 3079F DIAST BP 80-89 MM HG: CPT | Performed by: NURSE PRACTITIONER

## 2024-11-14 PROCEDURE — 1125F AMNT PAIN NOTED PAIN PRSNT: CPT | Performed by: NURSE PRACTITIONER

## 2024-11-14 PROCEDURE — 99213 OFFICE O/P EST LOW 20 MIN: CPT | Performed by: NURSE PRACTITIONER

## 2024-11-14 PROCEDURE — 3074F SYST BP LT 130 MM HG: CPT | Performed by: NURSE PRACTITIONER

## 2024-11-14 RX ORDER — IBUPROFEN 200 MG
200 TABLET ORAL EVERY 6 HOURS PRN
COMMUNITY

## 2024-11-14 RX ORDER — PROMETHAZINE HYDROCHLORIDE AND CODEINE PHOSPHATE 6.25; 1 MG/5ML; MG/5ML
5 SOLUTION ORAL EVERY 4 HOURS PRN
Qty: 240 ML | Refills: 0 | Status: SHIPPED | OUTPATIENT
Start: 2024-11-14

## 2024-11-14 RX ORDER — TRIAMCINOLONE ACETONIDE 40 MG/ML
80 INJECTION, SUSPENSION INTRA-ARTICULAR; INTRAMUSCULAR ONCE
Status: COMPLETED | OUTPATIENT
Start: 2024-11-14 | End: 2024-11-14

## 2024-11-14 RX ORDER — AZITHROMYCIN 250 MG/1
TABLET, FILM COATED ORAL
Qty: 6 TABLET | Refills: 0 | Status: SHIPPED | OUTPATIENT
Start: 2024-11-14

## 2024-11-14 RX ADMIN — TRIAMCINOLONE ACETONIDE 80 MG: 40 INJECTION, SUSPENSION INTRA-ARTICULAR; INTRAMUSCULAR at 13:21

## 2024-11-14 NOTE — PROGRESS NOTES
"Chief Complaint   Patient presents with    Cough     Symptoms started on Monday        Subjective   Jagdish Kuklarni is a 49 y.o. male who presents today for the following:  Nasal congestion and cough.  Cough  This is a new problem. The current episode started in the past 7 days. The problem has been comes and goes. The problem occurs intermittent. The cough is Productive of yellow sputum. Associated symptoms include nasal congestion, postnasal drip and rhinorrhea. Nothing aggravates the symptoms. He has tried nothing for the symptoms.      He started out two days ago with nasal congestin and yellow exudate. After that, he developed a cough that is off and on.     Allergies   Allergen Reactions    Other Other (See Comments)     Oral steroids:  Personality changes    Oxycontin [Oxycodone] Nausea And Vomiting and Other (See Comments)     Nightmares          OBJECTIVE:  Vitals:    11/14/24 1303   BP: 127/88   BP Location: Left arm   Patient Position: Sitting   Cuff Size: Large Adult   Pulse: 73   Resp: 20   Temp: 98.1 °F (36.7 °C)   TempSrc: Infrared   SpO2: 97%   Weight: 112 kg (246 lb 3.2 oz)   Height: 175.3 cm (69\")     Physical Exam  HENT:      Right Ear: Tympanic membrane is bulging.      Left Ear: Tympanic membrane is bulging.      Nose: Congestion and rhinorrhea present.      Mouth/Throat:      Pharynx: Pharyngeal swelling and posterior oropharyngeal erythema present.   Pulmonary:      Effort: Pulmonary effort is normal.      Breath sounds: Examination of the right-lower field reveals decreased breath sounds. Examination of the left-lower field reveals decreased breath sounds. Decreased breath sounds present.                    ASSESSMENT/ PLAN:    Diagnoses and all orders for this visit:    1. Bronchitis (Primary)  -     azithromycin (Zithromax Z-Sudhakar) 250 MG tablet; Take 2 tablets by mouth on day 1, then 1 tablet daily on days 2-5  Dispense: 6 tablet; Refill: 0  -     promethazine-codeine (PHENERGAN with CODEINE) " 6.25-10 MG/5ML solution; Take 5 mL by mouth Every 4 (Four) Hours As Needed for Cough.  Dispense: 240 mL; Refill: 0  -     triamcinolone acetonide (KENALOG-40) injection 80 mg      Procedures     Management Plan:   Complete all antibiotics.  An After Visit Summary was printed and given to the patient at discharge.    Follow-up: Return if symptoms worsen or fail to improve.         Viki Dubon, ELA 11/14/2024 13:14 CST  This note was electronically signed.

## 2024-11-18 DIAGNOSIS — R11.14 BILIOUS VOMITING WITH NAUSEA: ICD-10-CM

## 2024-11-18 RX ORDER — ONDANSETRON 8 MG/1
8 TABLET, ORALLY DISINTEGRATING ORAL EVERY 8 HOURS PRN
Qty: 30 TABLET | Refills: 0 | Status: SHIPPED | OUTPATIENT
Start: 2024-11-18

## 2024-11-18 NOTE — TELEPHONE ENCOUNTER
Caller: Jagdish Kulkarni    Relationship: Self    Best call back number: [phone]     Requested Prescriptions:   Requested Prescriptions     Pending Prescriptions Disp Refills    ondansetron ODT (ZOFRAN-ODT) 8 MG disintegrating tablet 30 tablet 0     Sig: Place 1 tablet on the tongue Every 8 (Eight) Hours As Needed for Nausea or Vomiting.        Pharmacy where request should be sent: [facility] [address] [phone] [facility] [phone]      Last office visit with prescribing clinician: 9/16/2024   Last telemedicine visit with prescribing clinician: Visit date not found   Next office visit with prescribing clinician: 11/25/2024     COMPLETELY OUT      Does the patient have less than a 3 day supply:  [x] Yes  [] No    Would you like a call back once the refill request has been completed: [] Yes [x] No    If the office needs to give you a call back, can they leave a voicemail: [] Yes [x] No    Andi Mesa Rep   11/18/24 12:13 CST

## 2024-11-18 NOTE — TELEPHONE ENCOUNTER
Rx Refill Note  Requested Prescriptions     Pending Prescriptions Disp Refills    ondansetron ODT (ZOFRAN-ODT) 8 MG disintegrating tablet 30 tablet 0     Sig: Place 1 tablet on the tongue Every 8 (Eight) Hours As Needed for Nausea or Vomiting.        Lay Chang MA  11/18/24, 12:52 CST

## 2024-11-25 ENCOUNTER — OFFICE VISIT (OUTPATIENT)
Dept: FAMILY MEDICINE CLINIC | Facility: CLINIC | Age: 49
End: 2024-11-25
Payer: COMMERCIAL

## 2024-11-25 VITALS
HEART RATE: 88 BPM | DIASTOLIC BLOOD PRESSURE: 75 MMHG | BODY MASS INDEX: 35.73 KG/M2 | OXYGEN SATURATION: 97 % | HEIGHT: 69 IN | WEIGHT: 241.2 LBS | RESPIRATION RATE: 16 BRPM | TEMPERATURE: 97.1 F | SYSTOLIC BLOOD PRESSURE: 103 MMHG

## 2024-11-25 DIAGNOSIS — M54.6 PAIN IN THORACIC SPINE: Chronic | ICD-10-CM

## 2024-11-25 DIAGNOSIS — M54.2 CERVICALGIA: Primary | Chronic | ICD-10-CM

## 2024-11-25 RX ORDER — TRIAMCINOLONE ACETONIDE 40 MG/ML
40 INJECTION, SUSPENSION INTRA-ARTICULAR; INTRAMUSCULAR ONCE
Status: COMPLETED | OUTPATIENT
Start: 2024-11-25 | End: 2024-11-25

## 2024-11-25 RX ADMIN — TRIAMCINOLONE ACETONIDE 40 MG: 40 INJECTION, SUSPENSION INTRA-ARTICULAR; INTRAMUSCULAR at 14:29

## 2024-11-25 NOTE — LETTER
November 25, 2024     Patient: Jagdish Kulkarni   YOB: 1975   Date of Visit: 11/25/2024       To Whom It May Concern:    Jagdish Kulkarni was seen by me in the office today, 11/25/2024. Please contact me with any questions or concerns.          Sincerely,        ELA Espinoza    CC: No Recipients

## 2024-11-25 NOTE — PROGRESS NOTES
"Chief Complaint  Back Pain, Neck Pain, and Shoulder Pain    Subjective        Jagdish Kulkarni presents to [facility]  Back Pain  This is a chronic problem. The current episode started more than 1 month ago. The problem occurs daily. The problem is unchanged. The pain is present in the thoracic spine. The quality of the pain is described as aching and shooting. The pain is at a severity of 6/10. The pain is moderate. The symptoms are aggravated by standing.   Neck Pain   This is a chronic problem. The current episode started more than 1 month ago. The problem occurs daily. The problem has been unchanged. The pain is at a severity of 6/10.     Jagdish Kulkarni has a history of back surgery x 4. He has experienced lower back pain in the past and has been seeing PT regularly. Physical therapy has become increasingly difficult with the neck and thoracic spine pain he is experiencing. He states sometimes the pain in his neck radiates to his left shoulder and causes tenderness. Left shoulder xray was performed 06/17/2024 and showed no signs of fracture or abnormality, some moderate AC joint arthrosis. The pain in his neck, upper back, and shoulder began about 6 months ago per patient. His job requires him to be on his feet constantly and lifting objects. Patient request referral to Dr. Castro as he has seen him in the past and was pleased with the care he received.     Objective   Vital Signs:  /75   Pulse 88   Temp 97.1 °F (36.2 °C)   Resp 16   Ht 175.3 cm (69\")   Wt 109 kg (241 lb 3.2 oz)   SpO2 97%   BMI 35.62 kg/m²   Estimated body mass index is 35.62 kg/m² as calculated from the following:    Height as of this encounter: 175.3 cm (69\").    Weight as of this encounter: 109 kg (241 lb 3.2 oz).            Physical Exam  Vitals and nursing note reviewed.   Constitutional:       Appearance: Normal appearance. He is obese.   Cardiovascular:      Rate and Rhythm: Normal rate and regular " rhythm.      Heart sounds: Normal heart sounds. No murmur heard.  Pulmonary:      Effort: Pulmonary effort is normal.      Breath sounds: Normal breath sounds.   Musculoskeletal:      Left shoulder: Tenderness present.      Cervical back: Spasms and tenderness present. Pain with movement present.      Thoracic back: Spasms and tenderness present.   Neurological:      Mental Status: He is alert and oriented to person, place, and time.   Psychiatric:         Mood and Affect: Mood normal.         Behavior: Behavior normal.        Result Review :                Assessment and Plan   Diagnoses and all orders for this visit:    1. Cervicalgia (Primary)  -     triamcinolone acetonide (KENALOG-40) injection 40 mg  -     Ambulatory Referral to Orthopedic Surgery    2. Pain in thoracic spine  -     triamcinolone acetonide (KENALOG-40) injection 40 mg  -     Ambulatory Referral to Orthopedic Surgery             Follow Up   Return if symptoms worsen or fail to improve.  Patient was given instructions and counseling regarding his condition or for health maintenance advice. Please see specific information pulled into the AVS if appropriate.

## 2024-12-02 DIAGNOSIS — I10 ESSENTIAL HYPERTENSION: Chronic | ICD-10-CM

## 2024-12-02 RX ORDER — LISINOPRIL 10 MG/1
10 TABLET ORAL DAILY
Qty: 90 TABLET | Refills: 1 | Status: SHIPPED | OUTPATIENT
Start: 2024-12-02

## 2024-12-02 NOTE — TELEPHONE ENCOUNTER
Rx Refill Note  Requested Prescriptions     Pending Prescriptions Disp Refills    lisinopril (PRINIVIL,ZESTRIL) 10 MG tablet [Pharmacy Med Name: LISINOPRIL 10MG TABS] 30 tablet 2     Sig: TAKE ONE TABLET BY MOUTH EVERY DAY        Lay Chang MA  12/02/24, 12:43 CST

## 2024-12-10 DIAGNOSIS — K21.9 GASTROESOPHAGEAL REFLUX DISEASE, UNSPECIFIED WHETHER ESOPHAGITIS PRESENT: ICD-10-CM

## 2024-12-10 RX ORDER — FAMOTIDINE 40 MG/1
40 TABLET, FILM COATED ORAL DAILY
Qty: 90 TABLET | Refills: 1 | Status: SHIPPED | OUTPATIENT
Start: 2024-12-10

## 2024-12-11 ENCOUNTER — OFFICE VISIT (OUTPATIENT)
Dept: FAMILY MEDICINE CLINIC | Facility: CLINIC | Age: 49
End: 2024-12-11
Payer: COMMERCIAL

## 2024-12-11 VITALS
HEIGHT: 69 IN | RESPIRATION RATE: 17 BRPM | DIASTOLIC BLOOD PRESSURE: 61 MMHG | HEART RATE: 96 BPM | SYSTOLIC BLOOD PRESSURE: 95 MMHG | OXYGEN SATURATION: 98 % | TEMPERATURE: 97.1 F | WEIGHT: 235 LBS | BODY MASS INDEX: 34.8 KG/M2

## 2024-12-11 DIAGNOSIS — R11.0 NAUSEA WITHOUT VOMITING: Primary | ICD-10-CM

## 2024-12-11 DIAGNOSIS — K30 STOMACH UPSET: ICD-10-CM

## 2024-12-11 DIAGNOSIS — F41.8 SITUATIONAL ANXIETY: ICD-10-CM

## 2024-12-11 PROCEDURE — 3078F DIAST BP <80 MM HG: CPT | Performed by: NURSE PRACTITIONER

## 2024-12-11 PROCEDURE — 1125F AMNT PAIN NOTED PAIN PRSNT: CPT | Performed by: NURSE PRACTITIONER

## 2024-12-11 PROCEDURE — 1160F RVW MEDS BY RX/DR IN RCRD: CPT | Performed by: NURSE PRACTITIONER

## 2024-12-11 PROCEDURE — 1159F MED LIST DOCD IN RCRD: CPT | Performed by: NURSE PRACTITIONER

## 2024-12-11 PROCEDURE — 99214 OFFICE O/P EST MOD 30 MIN: CPT | Performed by: NURSE PRACTITIONER

## 2024-12-11 PROCEDURE — 3074F SYST BP LT 130 MM HG: CPT | Performed by: NURSE PRACTITIONER

## 2024-12-11 RX ORDER — BUSPIRONE HYDROCHLORIDE 10 MG/1
10 TABLET ORAL 2 TIMES DAILY PRN
Qty: 60 TABLET | Refills: 0 | Status: SHIPPED | OUTPATIENT
Start: 2024-12-11

## 2024-12-11 RX ORDER — SUCRALFATE 1 G/1
1 TABLET ORAL
Qty: 40 TABLET | Refills: 0 | Status: SHIPPED | OUTPATIENT
Start: 2024-12-11

## 2024-12-11 NOTE — PROGRESS NOTES
"Chief Complaint  Abdominal Pain (Nausea without vomiting)    Subjective        Jagdish Kulkarni presents to [facility]  History of Present Illness  Patient lost his job yesterday and since then has been unable to eat, feels nauseated. No vomiting. He states he has drank an entire bottle of pepto-bismol with some improvement in symptoms.    Objective   Vital Signs:  BP 95/61 (BP Location: Left arm, Patient Position: Sitting, Cuff Size: Adult)   Pulse 96   Temp 97.1 °F (36.2 °C) (Infrared)   Resp 17   Ht 175.3 cm (69\")   Wt 107 kg (235 lb)   SpO2 98%   BMI 34.70 kg/m²   Estimated body mass index is 34.7 kg/m² as calculated from the following:    Height as of this encounter: 175.3 cm (69\").    Weight as of this encounter: 107 kg (235 lb).             Physical Exam  Vitals and nursing note reviewed.   Constitutional:       General: He is not in acute distress.     Appearance: Normal appearance. He is obese. He is not ill-appearing.   HENT:      Head: Normocephalic and atraumatic.   Cardiovascular:      Rate and Rhythm: Normal rate and regular rhythm.      Heart sounds: Normal heart sounds.   Pulmonary:      Effort: Pulmonary effort is normal.      Breath sounds: Normal breath sounds.   Abdominal:      General: Bowel sounds are normal.      Palpations: Abdomen is soft.      Tenderness: There is no abdominal tenderness.   Neurological:      Mental Status: He is alert and oriented to person, place, and time.   Psychiatric:         Thought Content: Thought content normal.        Result Review :                Assessment and Plan   Diagnoses and all orders for this visit:    1. Nausea without vomiting (Primary)  -     sucralfate (Carafate) 1 g tablet; Take 1 tablet by mouth 4 (Four) Times a Day Before Meals & at Bedtime.  Dispense: 40 tablet; Refill: 0    2. Situational anxiety  -     busPIRone (BUSPAR) 10 MG tablet; Take 1 tablet by mouth 2 (Two) Times a Day As Needed (anxiety).  " Dispense: 60 tablet; Refill: 0    3. Stomach upset  -     sucralfate (Carafate) 1 g tablet; Take 1 tablet by mouth 4 (Four) Times a Day Before Meals & at Bedtime.  Dispense: 40 tablet; Refill: 0    Stop the pepto. Explained that BM may be black after consumption.          Follow Up   Return if symptoms worsen or fail to improve.  Patient was given instructions and counseling regarding his condition or for health maintenance advice. Please see specific information pulled into the AVS if appropriate.     ELA Smiley  This note is electronically signed.

## 2024-12-20 ENCOUNTER — TRANSCRIBE ORDERS (OUTPATIENT)
Dept: ADMINISTRATIVE | Facility: HOSPITAL | Age: 49
End: 2024-12-20
Payer: COMMERCIAL

## 2024-12-20 DIAGNOSIS — M54.16 LUMBAR RADICULOPATHY: Primary | ICD-10-CM

## 2024-12-30 ENCOUNTER — HOSPITAL ENCOUNTER (OUTPATIENT)
Dept: CT IMAGING | Facility: HOSPITAL | Age: 49
Discharge: HOME | End: 2024-12-30
Admitting: ORTHOPAEDIC SURGERY
Payer: COMMERCIAL

## 2024-12-30 DIAGNOSIS — M54.16 LUMBAR RADICULOPATHY: ICD-10-CM

## 2024-12-30 PROCEDURE — 72131 CT LUMBAR SPINE W/O DYE: CPT

## 2025-01-17 ENCOUNTER — TELEPHONE (OUTPATIENT)
Dept: FAMILY MEDICINE CLINIC | Facility: CLINIC | Age: 50
End: 2025-01-17

## 2025-01-17 DIAGNOSIS — R09.81 NASAL CONGESTION: Primary | ICD-10-CM

## 2025-01-17 RX ORDER — FLUTICASONE PROPIONATE 50 MCG
2 SPRAY, SUSPENSION (ML) NASAL DAILY
Qty: 16 G | Refills: 2 | Status: SHIPPED | OUTPATIENT
Start: 2025-01-17

## 2025-01-17 NOTE — TELEPHONE ENCOUNTER
Caller: Jagdish Kulkarni    Relationship to patient: Self    Best call back number: [phone]         Patient is needing: PATIENT NEEDS SOMETHING FOR NASAL CONGESTION

## 2025-01-27 DIAGNOSIS — R11.14 BILIOUS VOMITING WITH NAUSEA: ICD-10-CM

## 2025-01-27 RX ORDER — ONDANSETRON 8 MG/1
8 TABLET, ORALLY DISINTEGRATING ORAL EVERY 8 HOURS PRN
Qty: 30 TABLET | Refills: 0 | Status: SHIPPED | OUTPATIENT
Start: 2025-01-27

## 2025-01-27 NOTE — TELEPHONE ENCOUNTER
Rx Refill Note  Requested Prescriptions     Pending Prescriptions Disp Refills    ondansetron ODT (ZOFRAN-ODT) 8 MG disintegrating tablet 30 tablet 0     Sig: Place 1 tablet on the tongue Every 8 (Eight) Hours As Needed for Nausea or Vomiting.       Lay Chang MA  01/27/25, 13:11 CST

## 2025-01-27 NOTE — TELEPHONE ENCOUNTER
Caller: Jagdish Kulkarni    Relationship: Self    Best call back number:     [phone]         Requested Prescriptions:   Requested Prescriptions     Pending Prescriptions Disp Refills    ondansetron ODT (ZOFRAN-ODT) 8 MG disintegrating tablet 30 tablet 0     Sig: Place 1 tablet on the tongue Every 8 (Eight) Hours As Needed for Nausea or Vomiting.        Pharmacy where request should be sent: [facility] [address] [phone] [facility] [phone]      Last office visit with prescribing clinician: 12/11/2024   Last telemedicine visit with prescribing clinician: Visit date not found   Next office visit with prescribing clinician: 3/17/2025     Additional details provided by patient: PATIENT IS OUT    Does the patient have less than a 3 day supply:  [x] Yes  [] No    Would you like a call back once the refill request has been completed: [x] Yes [] No    If the office needs to give you a call back, can they leave a voicemail: [x] Yes [] No    Andi Bowers Rep   01/27/25 11:40 CST

## 2025-02-02 DIAGNOSIS — J30.1 SEASONAL ALLERGIC RHINITIS DUE TO POLLEN: ICD-10-CM

## 2025-02-03 RX ORDER — CETIRIZINE HYDROCHLORIDE 10 MG/1
TABLET ORAL
Qty: 30 TABLET | Refills: 5 | Status: SHIPPED | OUTPATIENT
Start: 2025-02-03

## 2025-02-03 NOTE — TELEPHONE ENCOUNTER
Rx Refill Note  Requested Prescriptions     Pending Prescriptions Disp Refills    cetirizine (zyrTEC) 10 MG tablet [Pharmacy Med Name: CETIRIZINE HCL 10MG TABS] 30 tablet 5     Sig: TAKE ONE TABLET BY MOUTH EVERY DAY AS NEEDED FOR ALLERGIES OR RHINITIS       Lay Chang  02/03/25, 13:34 CST

## 2025-03-17 DIAGNOSIS — R73.03 PRE-DIABETES: ICD-10-CM

## 2025-03-28 ENCOUNTER — OFFICE VISIT (OUTPATIENT)
Dept: FAMILY MEDICINE CLINIC | Facility: CLINIC | Age: 50
End: 2025-03-28
Payer: COMMERCIAL

## 2025-03-28 VITALS
OXYGEN SATURATION: 98 % | BODY MASS INDEX: 34.8 KG/M2 | HEIGHT: 69 IN | HEART RATE: 72 BPM | DIASTOLIC BLOOD PRESSURE: 78 MMHG | RESPIRATION RATE: 18 BRPM | SYSTOLIC BLOOD PRESSURE: 112 MMHG | WEIGHT: 235 LBS | TEMPERATURE: 98 F

## 2025-03-28 DIAGNOSIS — G89.29 CHRONIC LEFT SHOULDER PAIN: Primary | ICD-10-CM

## 2025-03-28 DIAGNOSIS — M25.512 CHRONIC LEFT SHOULDER PAIN: Primary | ICD-10-CM

## 2025-03-28 PROCEDURE — 99213 OFFICE O/P EST LOW 20 MIN: CPT | Performed by: NURSE PRACTITIONER

## 2025-03-28 PROCEDURE — 1160F RVW MEDS BY RX/DR IN RCRD: CPT | Performed by: NURSE PRACTITIONER

## 2025-03-28 PROCEDURE — 3078F DIAST BP <80 MM HG: CPT | Performed by: NURSE PRACTITIONER

## 2025-03-28 PROCEDURE — 3074F SYST BP LT 130 MM HG: CPT | Performed by: NURSE PRACTITIONER

## 2025-03-28 PROCEDURE — 1125F AMNT PAIN NOTED PAIN PRSNT: CPT | Performed by: NURSE PRACTITIONER

## 2025-03-28 PROCEDURE — 1159F MED LIST DOCD IN RCRD: CPT | Performed by: NURSE PRACTITIONER

## 2025-03-28 NOTE — PROGRESS NOTES
"Chief Complaint  Neck Pain (Pt has been having upper neck pain. ), Shoulder Pain (Pt has been having shoulder pain.), and 6 Month Follow-Up (Pt presents for a 6 month follow-up)    Subjective        Jagdish Kulkarni presents to [facility]  History of Present Illness  Patient presents for follow up of chronic back and neck pain and hypertension. He is followed by pain management. He feels stable on current medication regimen. He continues to work; however, he states his neurosurgeon has suggested he apply for disability. He does have a new complaint of left shoulder pain which he states has bothered him for many months but is worsening. It hurts all the time, regardless of activity. No imaging has been completed. He is already taking opioid pain medication for his back/neck.    Objective   Vital Signs:  /78 (BP Location: Left arm, Patient Position: Sitting, Cuff Size: Large Adult)   Pulse 72   Temp 98 °F (36.7 °C) (Temporal)   Resp 18   Ht 175.3 cm (69\")   Wt 107 kg (235 lb)   SpO2 98%   BMI 34.70 kg/m²   Estimated body mass index is 34.7 kg/m² as calculated from the following:    Height as of this encounter: 175.3 cm (69\").    Weight as of this encounter: 107 kg (235 lb).    BMI is >= 30 and <35. (Class 1 Obesity). The following options were offered after discussion;: exercise counseling/recommendations and nutrition counseling/recommendations        Physical Exam  Vitals and nursing note reviewed.   Constitutional:       General: He is not in acute distress.     Appearance: Normal appearance. He is obese. He is not ill-appearing.   HENT:      Head: Normocephalic and atraumatic.   Cardiovascular:      Rate and Rhythm: Normal rate and regular rhythm.      Heart sounds: Normal heart sounds.   Pulmonary:      Effort: Pulmonary effort is normal.      Breath sounds: Normal breath sounds.   Musculoskeletal:         General: Tenderness present.      Right lower leg: No edema.      " Left lower leg: No edema.      Comments: Tenderness to left shoulder but no crepitus noted.   Skin:     General: Skin is warm and dry.   Neurological:      Mental Status: He is alert and oriented to person, place, and time.        Result Review :                Assessment and Plan   Diagnoses and all orders for this visit:    1. Chronic left shoulder pain (Primary)  -     XR Shoulder 2+ View Left; Future    Further recommendations will be made based on imaging results.         Follow Up   Return in about 6 months (around 9/17/2025) for annual physical with labs prior.  Patient was given instructions and counseling regarding his condition or for health maintenance advice. Please see specific information pulled into the AVS if appropriate.     ELA Smiley  This note is electronically signed.

## 2025-04-03 ENCOUNTER — TELEPHONE (OUTPATIENT)
Dept: FAMILY MEDICINE CLINIC | Facility: CLINIC | Age: 50
End: 2025-04-03

## 2025-04-03 NOTE — TELEPHONE ENCOUNTER
Caller: Jagdish Kulkarni    Relationship: Self    Best call back number: [phone]     What medication are you requesting: SOMETHING FOR ARTHRITIS    What are your current symptoms: PAIN IN ARM    How long have you been experiencing symptoms: ONGOING FOR A YEAR    If a prescription is needed, what is your preferred pharmacy and phone number:    [facility]    Additional notes:WILL COME IN FOR APPT IF NEEDED. PT SAYS X RAY SHOWED HE HAD ARTHRITIS. PLEASE CALL TO ADVISE.

## 2025-04-04 DIAGNOSIS — M25.50 ARTHRALGIA, UNSPECIFIED JOINT: Primary | ICD-10-CM

## 2025-04-04 RX ORDER — DICLOFENAC SODIUM 75 MG/1
75 TABLET, DELAYED RELEASE ORAL 2 TIMES DAILY
Qty: 60 TABLET | Refills: 2 | Status: SHIPPED | OUTPATIENT
Start: 2025-04-04

## 2025-05-13 ENCOUNTER — OFFICE VISIT (OUTPATIENT)
Dept: FAMILY MEDICINE CLINIC | Facility: CLINIC | Age: 50
End: 2025-05-13
Payer: COMMERCIAL

## 2025-05-13 VITALS
HEIGHT: 69 IN | BODY MASS INDEX: 35.4 KG/M2 | WEIGHT: 239 LBS | SYSTOLIC BLOOD PRESSURE: 130 MMHG | OXYGEN SATURATION: 97 % | HEART RATE: 79 BPM | TEMPERATURE: 98 F | DIASTOLIC BLOOD PRESSURE: 80 MMHG

## 2025-05-13 DIAGNOSIS — G89.29 CHRONIC LEFT SHOULDER PAIN: Primary | ICD-10-CM

## 2025-05-13 DIAGNOSIS — M25.512 CHRONIC LEFT SHOULDER PAIN: Primary | ICD-10-CM

## 2025-05-13 PROCEDURE — 3075F SYST BP GE 130 - 139MM HG: CPT | Performed by: NURSE PRACTITIONER

## 2025-05-13 PROCEDURE — 99213 OFFICE O/P EST LOW 20 MIN: CPT | Performed by: NURSE PRACTITIONER

## 2025-05-13 PROCEDURE — 1159F MED LIST DOCD IN RCRD: CPT | Performed by: NURSE PRACTITIONER

## 2025-05-13 PROCEDURE — 3079F DIAST BP 80-89 MM HG: CPT | Performed by: NURSE PRACTITIONER

## 2025-05-13 PROCEDURE — 1125F AMNT PAIN NOTED PAIN PRSNT: CPT | Performed by: NURSE PRACTITIONER

## 2025-05-13 PROCEDURE — 1160F RVW MEDS BY RX/DR IN RCRD: CPT | Performed by: NURSE PRACTITIONER

## 2025-05-13 NOTE — PROGRESS NOTES
"Chief Complaint   Patient presents with    Shoulder Pain     Chronic, worsened on Friday        Subjective   Jagdish Kulkarni is a 49 y.o. male who presents today for the following     HPI   Shoulder pain- Chronic. Patient states that pain become worse on Friday. He is taking pain medication from Pain Management. X-ray shows mild degenerative changes to AC joint.   Patient went to ER and was given some shots. He goes to Ortho doctor tomorrow at [facility].    Allergies   Allergen Reactions    Other Other (See Comments)     Oral steroids:  Personality changes    Oxycontin [Oxycodone] Nausea And Vomiting and Other (See Comments)     Nightmares          OBJECTIVE:  Vitals:    05/13/25 1102   BP: 130/80   BP Location: Left arm   Patient Position: Sitting   Cuff Size: Adult   Pulse: 79   Temp: 98 °F (36.7 °C)   TempSrc: Infrared   SpO2: 97%   Weight: 108 kg (239 lb)   Height: 175.3 cm (69\")     Physical Exam  Vitals and nursing note reviewed.   Constitutional:       Appearance: Normal appearance.   Cardiovascular:      Rate and Rhythm: Normal rate and regular rhythm.   Pulmonary:      Effort: Pulmonary effort is normal.      Breath sounds: Normal breath sounds.   Musculoskeletal:      Left shoulder: Tenderness present. Decreased range of motion.   Neurological:      Mental Status: He is alert.   Psychiatric:         Mood and Affect: Mood normal.         Behavior: Behavior normal.                    ASSESSMENT/ PLAN:    Diagnoses and all orders for this visit:    1. Chronic left shoulder pain (Primary)  -     Ambulatory Referral to Physical Therapy for Evaluation & Treatment      Procedures       Follow-up: Return in about 4 weeks (around 6/10/2025).      Sveta Myles, ELA 5/13/2025 12:38 CDT  This note was electronically signed.          " 99

## 2025-06-09 ENCOUNTER — OFFICE VISIT (OUTPATIENT)
Dept: FAMILY MEDICINE CLINIC | Facility: CLINIC | Age: 50
End: 2025-06-09
Payer: COMMERCIAL

## 2025-06-09 VITALS
DIASTOLIC BLOOD PRESSURE: 80 MMHG | SYSTOLIC BLOOD PRESSURE: 119 MMHG | OXYGEN SATURATION: 97 % | TEMPERATURE: 97.7 F | HEART RATE: 68 BPM | HEIGHT: 69 IN | WEIGHT: 230 LBS | RESPIRATION RATE: 17 BRPM | BODY MASS INDEX: 34.07 KG/M2

## 2025-06-09 DIAGNOSIS — R11.14 BILIOUS VOMITING WITH NAUSEA: ICD-10-CM

## 2025-06-09 DIAGNOSIS — K21.9 GASTROESOPHAGEAL REFLUX DISEASE, UNSPECIFIED WHETHER ESOPHAGITIS PRESENT: ICD-10-CM

## 2025-06-09 DIAGNOSIS — M25.512 ACUTE PAIN OF LEFT SHOULDER: Primary | ICD-10-CM

## 2025-06-09 DIAGNOSIS — I10 ESSENTIAL HYPERTENSION: Chronic | ICD-10-CM

## 2025-06-09 PROCEDURE — 3079F DIAST BP 80-89 MM HG: CPT | Performed by: NURSE PRACTITIONER

## 2025-06-09 PROCEDURE — 1125F AMNT PAIN NOTED PAIN PRSNT: CPT | Performed by: NURSE PRACTITIONER

## 2025-06-09 PROCEDURE — 99213 OFFICE O/P EST LOW 20 MIN: CPT | Performed by: NURSE PRACTITIONER

## 2025-06-09 PROCEDURE — 3074F SYST BP LT 130 MM HG: CPT | Performed by: NURSE PRACTITIONER

## 2025-06-09 PROCEDURE — 96372 THER/PROPH/DIAG INJ SC/IM: CPT | Performed by: NURSE PRACTITIONER

## 2025-06-09 RX ORDER — OMEPRAZOLE 20 MG/1
20 CAPSULE, DELAYED RELEASE ORAL DAILY
Qty: 90 CAPSULE | Refills: 1 | Status: SHIPPED | OUTPATIENT
Start: 2025-06-09

## 2025-06-09 RX ORDER — TRIAMCINOLONE ACETONIDE 40 MG/ML
80 INJECTION, SUSPENSION INTRA-ARTICULAR; INTRAMUSCULAR ONCE
Status: COMPLETED | OUTPATIENT
Start: 2025-06-09 | End: 2025-06-09

## 2025-06-09 RX ORDER — ONDANSETRON 8 MG/1
8 TABLET, ORALLY DISINTEGRATING ORAL EVERY 8 HOURS PRN
Qty: 30 TABLET | Refills: 0 | Status: SHIPPED | OUTPATIENT
Start: 2025-06-09

## 2025-06-09 RX ORDER — LISINOPRIL 10 MG/1
10 TABLET ORAL DAILY
Qty: 90 TABLET | Refills: 1 | Status: SHIPPED | OUTPATIENT
Start: 2025-06-09

## 2025-06-09 RX ADMIN — TRIAMCINOLONE ACETONIDE 80 MG: 40 INJECTION, SUSPENSION INTRA-ARTICULAR; INTRAMUSCULAR at 14:46

## 2025-06-09 NOTE — PROGRESS NOTES
"Chief Complaint   Patient presents with    Shoulder Pain     Chronic, L shoulder        Subjective   Jagdish Kulkarni is a 49 y.o. male who presents today for the following:  Left shoulder pain  HPI   Patient has had left shoulder pain x 1 month and he has done physical therapy and seen orthopedic in [place]. No known injury. He has had 2 injections there. His shoulder has not gotten better He will receive a steroid injection today. That satisfies the requirements to move on to getting an MRI. He did get some injections in his left shoulder but they did not give him any relief.    Allergies   Allergen Reactions    Other Other (See Comments)     Oral steroids:  Personality changes    Oxycontin [Oxycodone] Nausea And Vomiting and Other (See Comments)     Nightmares          OBJECTIVE:  Vitals:    06/09/25 1355   BP: 119/80   BP Location: Left arm   Patient Position: Sitting   Cuff Size: Large Adult   Pulse: 68   Resp: 17   Temp: 97.7 °F (36.5 °C)   TempSrc: Infrared   SpO2: 97%   Weight: 104 kg (230 lb)   Height: 175.3 cm (69\")     Physical Exam  Musculoskeletal:      Left shoulder: Tenderness and crepitus present. Decreased range of motion.      Comments: Patient cannot lift his arm over his head or put his arm behind him                    ASSESSMENT/ PLAN:    Diagnoses and all orders for this visit:    1. Acute pain of left shoulder (Primary)  -     triamcinolone acetonide (KENALOG-40) injection 80 mg  -     MRI Shoulder Left Without Contrast; Future      Procedures     Management Plan:   Further orders will be determined by imaging results.  An After Visit Summary was printed and given to the patient at discharge.    Follow-up: Return for Recheck after MRI results return.         Viki Dubon, ELA 6/9/2025 15:31 CDT  This note was electronically signed.          "

## 2025-06-23 DIAGNOSIS — K21.9 GASTROESOPHAGEAL REFLUX DISEASE, UNSPECIFIED WHETHER ESOPHAGITIS PRESENT: ICD-10-CM

## 2025-06-24 ENCOUNTER — PROCEDURE VISIT (OUTPATIENT)
Dept: FAMILY MEDICINE CLINIC | Facility: CLINIC | Age: 50
End: 2025-06-24
Payer: COMMERCIAL

## 2025-06-24 VITALS
WEIGHT: 226.2 LBS | SYSTOLIC BLOOD PRESSURE: 125 MMHG | DIASTOLIC BLOOD PRESSURE: 75 MMHG | HEART RATE: 77 BPM | TEMPERATURE: 98.3 F | RESPIRATION RATE: 17 BRPM | BODY MASS INDEX: 33.5 KG/M2 | OXYGEN SATURATION: 99 % | HEIGHT: 69 IN

## 2025-06-24 DIAGNOSIS — L91.8 SKIN TAG: Primary | ICD-10-CM

## 2025-06-24 PROCEDURE — 99213 OFFICE O/P EST LOW 20 MIN: CPT | Performed by: NURSE PRACTITIONER

## 2025-06-24 RX ORDER — FAMOTIDINE 40 MG/1
40 TABLET, FILM COATED ORAL DAILY
Qty: 90 TABLET | Refills: 1 | Status: SHIPPED | OUTPATIENT
Start: 2025-06-24

## 2025-06-24 NOTE — PROGRESS NOTES
"Chief Complaint   Patient presents with    Procedure     Skin tag removal         Subjective   Jagdish Kulkarni is a 50 y.o. male who presents today for the following:  Skin tags  HPI   Patient has skin tags on both anterior upper arms.He would like to have them removed.     Allergies   Allergen Reactions    Other Other (See Comments)     Oral steroids:  Personality changes    Oxycontin [Oxycodone] Nausea And Vomiting and Other (See Comments)     Nightmares          OBJECTIVE:  Vitals:    06/24/25 0912   BP: 125/75   BP Location: Left arm   Patient Position: Sitting   Cuff Size: Large Adult   Pulse: 77   Resp: 17   Temp: 98.3 °F (36.8 °C)   TempSrc: Infrared   SpO2: 99%   Weight: 103 kg (226 lb 3.2 oz)   Height: 175.3 cm (69\")     Physical Exam  Skin:     General: Skin is warm.      Comments: 3 mm skin tag on left upper arm  2 mm skin tag on right upper arm                    ASSESSMENT/ PLAN:    There are no diagnoses linked to this encounter.  Skin Tag Removal    Date/Time: 6/24/2025 9:46 AM    Performed by: Viki Dubon APRN  Authorized by: Viki Dubon APRN  Preparation: Patient was prepped and draped in the usual sterile fashion.  Local anesthesia used: yes  Anesthesia: local infiltration    Anesthesia:  Local anesthesia used: yes  Local Anesthetic: lidocaine 1% with epinephrine  Anesthetic total: 3 mL    Sedation:  Patient sedated: no    Patient tolerance: patient tolerated the procedure well with no immediate complications  Comments: Both skin tags prepped with lidocaine 1% with epi and alcohol. Skin tags hyfrecated entirely. No bleeding.            Management Plan:   Keep clean and dry. No ointments or creams.  An After Visit Summary was printed and given to the patient at discharge.    Follow-up: Return if symptoms worsen or fail to improve.         ELA Sheikh 6/24/2025 09:48 CDT  This note was electronically signed.          "

## 2025-07-09 ENCOUNTER — OFFICE VISIT (OUTPATIENT)
Dept: FAMILY MEDICINE CLINIC | Facility: CLINIC | Age: 50
End: 2025-07-09
Payer: COMMERCIAL

## 2025-07-09 VITALS
RESPIRATION RATE: 16 BRPM | TEMPERATURE: 97.8 F | BODY MASS INDEX: 33.71 KG/M2 | SYSTOLIC BLOOD PRESSURE: 120 MMHG | DIASTOLIC BLOOD PRESSURE: 80 MMHG | HEIGHT: 69 IN | OXYGEN SATURATION: 98 % | WEIGHT: 227.6 LBS | HEART RATE: 75 BPM

## 2025-07-09 DIAGNOSIS — R53.83 OTHER FATIGUE: ICD-10-CM

## 2025-07-09 DIAGNOSIS — E78.2 MIXED HYPERLIPIDEMIA: ICD-10-CM

## 2025-07-09 DIAGNOSIS — R10.9 FLANK PAIN: Primary | ICD-10-CM

## 2025-07-09 DIAGNOSIS — R73.03 PREDIABETES: ICD-10-CM

## 2025-07-09 DIAGNOSIS — Z12.5 SCREENING FOR PROSTATE CANCER: ICD-10-CM

## 2025-07-09 DIAGNOSIS — R35.0 INCREASED URINARY FREQUENCY: ICD-10-CM

## 2025-07-09 LAB
BILIRUB BLD-MCNC: ABNORMAL MG/DL
CLARITY, POC: CLEAR
COLOR UR: YELLOW
GLUCOSE UR STRIP-MCNC: NEGATIVE MG/DL
KETONES UR QL: NEGATIVE
LEUKOCYTE EST, POC: NEGATIVE
NITRITE UR-MCNC: NEGATIVE MG/ML
PH UR: 6 [PH] (ref 5–8)
PROT UR STRIP-MCNC: ABNORMAL MG/DL
RBC # UR STRIP: NEGATIVE /UL
SP GR UR: 1.02 (ref 1–1.03)
UROBILINOGEN UR QL: NORMAL

## 2025-07-09 NOTE — PROGRESS NOTES
"Chief Complaint   Patient presents with    Flank Pain     R side only , symptoms started 1-2 weeks ago. Pt states that it was intermittent but is now constant.         Subjective   Jagdish Kulkarni is a 50 y.o. male who presents today for the following     Flank Pain  Chronicity:  New  Onset:  1 to 4 weeks ago  Pain location: Right lower back pain that wraps around to right flank area.  Exacerbated by: movement makes it worse.  Associated symptoms: no fever    Associated symptoms comment:  Increase in urination.   Patient denies injury or lifting anything heavy.     Allergies   Allergen Reactions    Other Other (See Comments)     Oral steroids:  Personality changes    Oxycontin [Oxycodone] Nausea And Vomiting and Other (See Comments)     Nightmares          OBJECTIVE:  Vitals:    07/09/25 1326   BP: 120/80   BP Location: Left arm   Patient Position: Sitting   Cuff Size: Adult   Pulse: 75   Resp: 16   Temp: 97.8 °F (36.6 °C)   TempSrc: Infrared   SpO2: 98%   Weight: 103 kg (227 lb 9.6 oz)   Height: 175.3 cm (69\")     Physical Exam  Vitals and nursing note reviewed.   Constitutional:       Appearance: Normal appearance.   Cardiovascular:      Rate and Rhythm: Normal rate and regular rhythm.   Pulmonary:      Effort: Pulmonary effort is normal.      Breath sounds: Normal breath sounds.   Abdominal:      General: Abdomen is flat. Bowel sounds are normal.      Palpations: Abdomen is soft.      Tenderness: There is no abdominal tenderness.   Neurological:      Mental Status: He is alert.   Psychiatric:         Mood and Affect: Mood normal.         Behavior: Behavior normal.               ASSESSMENT/ PLAN:    Diagnoses and all orders for this visit:    1. Flank pain (Primary)  -     POC Urinalysis Dipstick, Multipro    2. Increased urinary frequency  -     POC Urinalysis Dipstick, Multipro    3. Prediabetes  -     Hemoglobin A1c    4. Other fatigue  -     CBC & Differential  -     Comprehensive Metabolic Panel  -     TSH Rfx On " Abnormal To Free T4    5. Screening for prostate cancer  -     PSA Screen    6. Mixed hyperlipidemia  -     Lipid Panel With LDL / HDL Ratio    Labs drawn in clinic today.   Advised patient to increase water intake.     Procedures       Follow-up: Return if symptoms worsen or fail to improve.      Sveta Myles, APRN 7/9/2025 14:56 CDT  This note was electronically signed.

## 2025-07-10 LAB
ALBUMIN SERPL-MCNC: 4.4 G/DL (ref 4.1–5.1)
ALP SERPL-CCNC: 67 IU/L (ref 44–121)
ALT SERPL-CCNC: 28 IU/L (ref 0–44)
AST SERPL-CCNC: 25 IU/L (ref 0–40)
BASOPHILS # BLD AUTO: 0.1 X10E3/UL (ref 0–0.2)
BASOPHILS NFR BLD AUTO: 1 %
BILIRUB SERPL-MCNC: 0.5 MG/DL (ref 0–1.2)
BUN SERPL-MCNC: 17 MG/DL (ref 6–24)
BUN/CREAT SERPL: 22 (ref 9–20)
CALCIUM SERPL-MCNC: 9.6 MG/DL (ref 8.7–10.2)
CHLORIDE SERPL-SCNC: 104 MMOL/L (ref 96–106)
CHOLEST SERPL-MCNC: 145 MG/DL (ref 100–199)
CO2 SERPL-SCNC: 19 MMOL/L (ref 20–29)
CREAT SERPL-MCNC: 0.77 MG/DL (ref 0.76–1.27)
EGFRCR SERPLBLD CKD-EPI 2021: 109 ML/MIN/1.73
EOSINOPHIL # BLD AUTO: 0.1 X10E3/UL (ref 0–0.4)
EOSINOPHIL NFR BLD AUTO: 1 %
ERYTHROCYTE [DISTWIDTH] IN BLOOD BY AUTOMATED COUNT: 12.9 % (ref 11.6–15.4)
GLOBULIN SER CALC-MCNC: 2.5 G/DL (ref 1.5–4.5)
GLUCOSE SERPL-MCNC: 103 MG/DL (ref 70–99)
HBA1C MFR BLD: 6 % (ref 4.8–5.6)
HCT VFR BLD AUTO: 44.8 % (ref 37.5–51)
HDLC SERPL-MCNC: 58 MG/DL
HGB BLD-MCNC: 14.4 G/DL (ref 13–17.7)
IMM GRANULOCYTES # BLD AUTO: 0 X10E3/UL (ref 0–0.1)
IMM GRANULOCYTES NFR BLD AUTO: 0 %
LDLC SERPL CALC-MCNC: 74 MG/DL (ref 0–99)
LDLC/HDLC SERPL: 1.3 RATIO (ref 0–3.6)
LYMPHOCYTES # BLD AUTO: 2.7 X10E3/UL (ref 0.7–3.1)
LYMPHOCYTES NFR BLD AUTO: 31 %
MCH RBC QN AUTO: 30.2 PG (ref 26.6–33)
MCHC RBC AUTO-ENTMCNC: 32.1 G/DL (ref 31.5–35.7)
MCV RBC AUTO: 94 FL (ref 79–97)
MONOCYTES # BLD AUTO: 0.6 X10E3/UL (ref 0.1–0.9)
MONOCYTES NFR BLD AUTO: 7 %
NEUTROPHILS # BLD AUTO: 5.2 X10E3/UL (ref 1.4–7)
NEUTROPHILS NFR BLD AUTO: 60 %
PLATELET # BLD AUTO: 252 X10E3/UL (ref 150–450)
POTASSIUM SERPL-SCNC: 4.6 MMOL/L (ref 3.5–5.2)
PROT SERPL-MCNC: 6.9 G/DL (ref 6–8.5)
PSA SERPL-MCNC: 0.5 NG/ML (ref 0–4)
RBC # BLD AUTO: 4.77 X10E6/UL (ref 4.14–5.8)
SODIUM SERPL-SCNC: 141 MMOL/L (ref 134–144)
TRIGL SERPL-MCNC: 61 MG/DL (ref 0–149)
TSH SERPL DL<=0.005 MIU/L-ACNC: 1.35 UIU/ML (ref 0.45–4.5)
VLDLC SERPL CALC-MCNC: 13 MG/DL (ref 5–40)
WBC # BLD AUTO: 8.6 X10E3/UL (ref 3.4–10.8)

## 2025-07-16 ENCOUNTER — TELEPHONE (OUTPATIENT)
Dept: FAMILY MEDICINE CLINIC | Facility: CLINIC | Age: 50
End: 2025-07-16

## 2025-08-04 DIAGNOSIS — R11.14 BILIOUS VOMITING WITH NAUSEA: ICD-10-CM

## 2025-08-04 RX ORDER — ONDANSETRON 8 MG/1
8 TABLET, ORALLY DISINTEGRATING ORAL EVERY 8 HOURS PRN
Qty: 30 TABLET | Refills: 0 | Status: SHIPPED | OUTPATIENT
Start: 2025-08-04

## 2025-08-04 RX ORDER — ONDANSETRON 8 MG/1
8 TABLET, ORALLY DISINTEGRATING ORAL EVERY 8 HOURS PRN
Qty: 30 TABLET | Refills: 0 | OUTPATIENT
Start: 2025-08-04

## 2025-08-14 DIAGNOSIS — J30.1 SEASONAL ALLERGIC RHINITIS DUE TO POLLEN: ICD-10-CM

## 2025-08-14 RX ORDER — CETIRIZINE HYDROCHLORIDE 10 MG/1
TABLET ORAL
Qty: 30 TABLET | Refills: 5 | Status: SHIPPED | OUTPATIENT
Start: 2025-08-14

## (undated) DEVICE — SUTURE VCRL SZ 1 L27IN ABSRB UD L36MM CP-1 1/2 CIR REV CUT J268H

## (undated) DEVICE — 4.0MM PRECISION ROUND

## (undated) DEVICE — YANKAUER SUCTION INSTRUMENT WITHOUT CONTROL VENT, OPEN TIP, CLEAR: Brand: YANKAUER

## (undated) DEVICE — DRP C/ARMOR

## (undated) DEVICE — MASTISOL ADHESIVE LIQ 2/3ML

## (undated) DEVICE — SOLUTION IV 100ML 0.9% SOD CHL PLAS CONT USP VIAFLX 1 PER

## (undated) DEVICE — GLOVE SURG SZ 75 L12IN FNGR THK87MIL DK GRN LTX FREE ISOLEX

## (undated) DEVICE — GLOVE SURG SZ 75 L12IN FNGR THK94MIL TRNSLUC YEL LTX

## (undated) DEVICE — DISSECTOR LAPSCP TIP DIA5MM BLNT W/ SECUR ATTACH KTNR

## (undated) DEVICE — UNDERGLOVE SURG SZ 8 FNGR THK0.21MIL GRN LTX BEAD CUF

## (undated) DEVICE — ARCUS STIMULATING TARGETING NEEDLE, BEVEL TIP: Brand: ARCUS

## (undated) DEVICE — HYPODERMIC SAFETY NEEDLE: Brand: MAGELLAN

## (undated) DEVICE — SUTURE VCRL SZ 2-0 L18IN ABSRB UD CT-1 L36MM 1/2 CIR J839D

## (undated) DEVICE — ANTIBACTERIAL UNDYED BRAIDED (POLYGLACTIN 910), SYNTHETIC ABSORBABLE SUTURE: Brand: COATED VICRYL

## (undated) DEVICE — TIBURON DRAPE TOWELS: Brand: CONVERTORS

## (undated) DEVICE — BONE MARROW KIT SM BIO ART BMC

## (undated) DEVICE — ELECTRD BLD EZ CLN MOD XLNG 2.75IN

## (undated) DEVICE — 4-PORT MANIFOLD: Brand: NEPTUNE 2

## (undated) DEVICE — APPL CHLORAPREP HI/LITE 26ML ORNG

## (undated) DEVICE — CONN FLX BREATHE CIRCT

## (undated) DEVICE — PENCL ES MEGADINE EZ/CLEAN BUTN W/HOLSTR 10FT

## (undated) DEVICE — K WIRE FIX CHAMFERED FOR LAT FUS SYS TIMBERLINE
Type: IMPLANTABLE DEVICE | Site: SPINE LUMBAR | Status: NON-FUNCTIONAL
Removed: 2017-05-16

## (undated) DEVICE — KT INST SPINE LLIF/XLIF MAXCESS SURG/ACC 1/PU DISP

## (undated) DEVICE — GLV SURG SENSICARE W/ALOE PF LF 7.5 STRL

## (undated) DEVICE — 3.0MM PRECISION NEURO (MATCH HEAD)

## (undated) DEVICE — SET TBNG L10FT BPLR CRD MALIS IRR

## (undated) DEVICE — GLV SURG BIOGEL LTX PF 7 1/2

## (undated) DEVICE — SYS ACC IPAS3 EMG I/O PED BVL

## (undated) DEVICE — SUTURE MCRYL SZ 4-0 L18IN ABSRB UD L19MM PS-2 3/8 CIR PRIM Y496G

## (undated) DEVICE — CATH IV ANGIO FEP 12G 3IN LTBLU 10PK

## (undated) DEVICE — DRP SURG UTIL W/TPE 2/LAYR 15X26IN DISP

## (undated) DEVICE — NEEDLE HYPO 18GA L1.5IN PNK POLYPR HUB S STL REG BVL STR

## (undated) DEVICE — HANDLE PRB CANN DETACH FOR GRP MOD MOD PEDIGUARD

## (undated) DEVICE — PK SPINE POST 30

## (undated) DEVICE — C-ARMOR C-ARM EQUIPMENT COVERS CLEAR STERILE UNIVERSAL FIT 12 PER CASE: Brand: C-ARMOR

## (undated) DEVICE — NEURO CDS

## (undated) DEVICE — SOLUTION IV IRRIG POUR BRL 0.9% SODIUM CHL 2F7124

## (undated) DEVICE — SPK10277 JACKSON/PRO-AXIS KIT: Brand: SPK10277 JACKSON/PRO-AXIS KIT

## (undated) DEVICE — PK SPINE LAT 30

## (undated) DEVICE — GLOVE SURG SZ 8 L12IN FNGR THK94MIL TRNSLUC YEL LTX HYDRGEL

## (undated) DEVICE — GLV SURG GRN DERMASSURE LF PF 7.5

## (undated) DEVICE — BIPOLAR SEALER 23-121-1 AQM EVS: Brand: AQUAMANTYS™

## (undated) DEVICE — TROCAR TIP NITINOL GUIDEWIRE 18": Brand: INVICTUS

## (undated) DEVICE — SPNG GZ WOVN 4X4IN 12PLY 10/BX STRL

## (undated) DEVICE — 3M™ STERI-STRIP™ REINFORCED ADHESIVE SKIN CLOSURES, R1548, 1 IN X 5 IN (25 MM X 125 MM), 4 STRIPS/ENVELOPE: Brand: 3M™ STERI-STRIP™

## (undated) DEVICE — 1010 S-DRAPE TOWEL DRAPE 10/BX: Brand: STERI-DRAPE™

## (undated) DEVICE — KIT MON ACCS ANNULOTOMY KNF WAVE GUID DISP TIMBERLINE

## (undated) DEVICE — SOLUTION IV 250ML 0.9% SOD CHL PH 5 INJ USP VIAFLX PLAS

## (undated) DEVICE — GLV SURG BIOGEL LTX PF 6 1/2

## (undated) DEVICE — CVR UNIV C/ARM

## (undated) DEVICE — FORCEP BPLR IRIS

## (undated) DEVICE — KIT POS FOAM ARM CRADL BILAT CHST PD CVR HIP HNG CVR L

## (undated) DEVICE — PK TURNOVER RM ADV

## (undated) DEVICE — FORCEPS BPLR IRR INSUL BAYNT SMOOTH TIP STRL DISP L26.7CM SZ

## (undated) DEVICE — DILATOR NEUROVISION XLIF M5 KT 6 9 12

## (undated) DEVICE — GLV SURG DERMASSURE GRN LF PF 8.0

## (undated) DEVICE — TP SILK DURAPORE 3IN

## (undated) DEVICE — Device

## (undated) DEVICE — PROBE PEDCL L165MM CANN W/ MOD JAMSH NDL NO2 MOD MOD

## (undated) DEVICE — SUTURE VCRL SZ 1 L18IN ABSRB UD L36MM CT-1 1/2 CIR J841D

## (undated) DEVICE — PATIENT RETURN ELECTRODE, SINGLE-USE, CONTACT QUALITY MONITORING, ADULT, WITH 9FT CORD, FOR PATIENTS WEIGING OVER 33LBS. (15KG): Brand: MEGADYNE

## (undated) DEVICE — SYSTEM SKIN CLSR 22CM DERMBND PRINEO